# Patient Record
Sex: FEMALE | Race: WHITE | NOT HISPANIC OR LATINO | Employment: UNEMPLOYED | URBAN - METROPOLITAN AREA
[De-identification: names, ages, dates, MRNs, and addresses within clinical notes are randomized per-mention and may not be internally consistent; named-entity substitution may affect disease eponyms.]

---

## 2024-06-17 ENCOUNTER — HOSPITAL ENCOUNTER (INPATIENT)
Facility: HOSPITAL | Age: 84
LOS: 6 days | Discharge: HOME HEALTH | DRG: 872 | End: 2024-06-23
Attending: EMERGENCY MEDICINE | Admitting: INTERNAL MEDICINE
Payer: MEDICARE

## 2024-06-17 ENCOUNTER — APPOINTMENT (OUTPATIENT)
Dept: GENERAL RADIOLOGY | Facility: HOSPITAL | Age: 84
DRG: 872 | End: 2024-06-17
Payer: MEDICARE

## 2024-06-17 DIAGNOSIS — A41.9 SEPTIC SHOCK: Primary | ICD-10-CM

## 2024-06-17 DIAGNOSIS — R65.21 SEPTIC SHOCK: Primary | ICD-10-CM

## 2024-06-17 DIAGNOSIS — L03.116 CELLULITIS OF LEFT LOWER EXTREMITY: ICD-10-CM

## 2024-06-17 PROBLEM — R65.20 SEVERE SEPSIS: Status: ACTIVE | Noted: 2024-06-17

## 2024-06-17 LAB
ALBUMIN SERPL-MCNC: 3.7 G/DL (ref 3.5–5.2)
ALBUMIN/GLOB SERPL: 1.4 G/DL
ALP SERPL-CCNC: 46 U/L (ref 39–117)
ALT SERPL W P-5'-P-CCNC: 12 U/L (ref 1–33)
ANION GAP SERPL CALCULATED.3IONS-SCNC: 12.8 MMOL/L (ref 5–15)
AST SERPL-CCNC: 31 U/L (ref 1–32)
BASOPHILS # BLD AUTO: 0.02 10*3/MM3 (ref 0–0.2)
BASOPHILS NFR BLD AUTO: 0.1 % (ref 0–1.5)
BILIRUB SERPL-MCNC: 1.1 MG/DL (ref 0–1.2)
BUN SERPL-MCNC: 55 MG/DL (ref 8–23)
BUN/CREAT SERPL: 20.1 (ref 7–25)
CALCIUM SPEC-SCNC: 9.4 MG/DL (ref 8.6–10.5)
CHLORIDE SERPL-SCNC: 92 MMOL/L (ref 98–107)
CO2 SERPL-SCNC: 28.2 MMOL/L (ref 22–29)
CREAT SERPL-MCNC: 2.74 MG/DL (ref 0.57–1)
D-LACTATE SERPL-SCNC: 1.4 MMOL/L (ref 0.5–2)
D-LACTATE SERPL-SCNC: 2.1 MMOL/L (ref 0.5–2)
D-LACTATE SERPL-SCNC: 3.5 MMOL/L (ref 0.5–2)
DEPRECATED RDW RBC AUTO: 52.3 FL (ref 37–54)
EGFRCR SERPLBLD CKD-EPI 2021: 16.8 ML/MIN/1.73
EOSINOPHIL # BLD AUTO: 0.02 10*3/MM3 (ref 0–0.4)
EOSINOPHIL NFR BLD AUTO: 0.1 % (ref 0.3–6.2)
ERYTHROCYTE [DISTWIDTH] IN BLOOD BY AUTOMATED COUNT: 13.7 % (ref 12.3–15.4)
GLOBULIN UR ELPH-MCNC: 2.6 GM/DL
GLUCOSE BLDC GLUCOMTR-MCNC: 135 MG/DL (ref 70–130)
GLUCOSE SERPL-MCNC: 160 MG/DL (ref 65–99)
HCT VFR BLD AUTO: 41.5 % (ref 34–46.6)
HGB BLD-MCNC: 14.1 G/DL (ref 12–15.9)
IMM GRANULOCYTES # BLD AUTO: 0.07 10*3/MM3 (ref 0–0.05)
IMM GRANULOCYTES NFR BLD AUTO: 0.4 % (ref 0–0.5)
LYMPHOCYTES # BLD AUTO: 2.3 10*3/MM3 (ref 0.7–3.1)
LYMPHOCYTES NFR BLD AUTO: 12.9 % (ref 19.6–45.3)
MCH RBC QN AUTO: 35.1 PG (ref 26.6–33)
MCHC RBC AUTO-ENTMCNC: 34 G/DL (ref 31.5–35.7)
MCV RBC AUTO: 103.2 FL (ref 79–97)
MONOCYTES # BLD AUTO: 1.38 10*3/MM3 (ref 0.1–0.9)
MONOCYTES NFR BLD AUTO: 7.7 % (ref 5–12)
NEUTROPHILS NFR BLD AUTO: 14.03 10*3/MM3 (ref 1.7–7)
NEUTROPHILS NFR BLD AUTO: 78.8 % (ref 42.7–76)
PLATELET # BLD AUTO: 132 10*3/MM3 (ref 140–450)
PMV BLD AUTO: 10.4 FL (ref 6–12)
POTASSIUM SERPL-SCNC: 4.5 MMOL/L (ref 3.5–5.2)
PROT SERPL-MCNC: 6.3 G/DL (ref 6–8.5)
RBC # BLD AUTO: 4.02 10*6/MM3 (ref 3.77–5.28)
SODIUM SERPL-SCNC: 133 MMOL/L (ref 136–145)
WBC NRBC COR # BLD AUTO: 17.82 10*3/MM3 (ref 3.4–10.8)

## 2024-06-17 PROCEDURE — 82948 REAGENT STRIP/BLOOD GLUCOSE: CPT

## 2024-06-17 PROCEDURE — 25010000002 VANCOMYCIN HCL IN NACL 1.5-0.9 GM/500ML-% SOLUTION: Performed by: PHYSICIAN ASSISTANT

## 2024-06-17 PROCEDURE — 90715 TDAP VACCINE 7 YRS/> IM: CPT | Performed by: PHYSICIAN ASSISTANT

## 2024-06-17 PROCEDURE — 93005 ELECTROCARDIOGRAM TRACING: CPT | Performed by: PHYSICIAN ASSISTANT

## 2024-06-17 PROCEDURE — 99285 EMERGENCY DEPT VISIT HI MDM: CPT | Performed by: PHYSICIAN ASSISTANT

## 2024-06-17 PROCEDURE — 25010000002 TETANUS-DIPHTH-ACELL PERTUSSIS 5-2.5-18.5 LF-MCG/0.5 SUSPENSION PREFILLED SYRINGE: Performed by: PHYSICIAN ASSISTANT

## 2024-06-17 PROCEDURE — 80053 COMPREHEN METABOLIC PANEL: CPT | Performed by: PHYSICIAN ASSISTANT

## 2024-06-17 PROCEDURE — 25810000003 SODIUM CHLORIDE 0.9% - IBW FOR BMI > 30 0.9 % SOLUTION: Performed by: INTERNAL MEDICINE

## 2024-06-17 PROCEDURE — 93010 ELECTROCARDIOGRAM REPORT: CPT | Performed by: INTERNAL MEDICINE

## 2024-06-17 PROCEDURE — 83605 ASSAY OF LACTIC ACID: CPT | Performed by: PHYSICIAN ASSISTANT

## 2024-06-17 PROCEDURE — 71045 X-RAY EXAM CHEST 1 VIEW: CPT

## 2024-06-17 PROCEDURE — 99285 EMERGENCY DEPT VISIT HI MDM: CPT

## 2024-06-17 PROCEDURE — 90471 IMMUNIZATION ADMIN: CPT | Performed by: PHYSICIAN ASSISTANT

## 2024-06-17 PROCEDURE — 36415 COLL VENOUS BLD VENIPUNCTURE: CPT

## 2024-06-17 PROCEDURE — 25810000003 SODIUM CHLORIDE 0.9% - IBW FOR BMI > 30 0.9 % SOLUTION: Performed by: PHYSICIAN ASSISTANT

## 2024-06-17 PROCEDURE — 87040 BLOOD CULTURE FOR BACTERIA: CPT | Performed by: PHYSICIAN ASSISTANT

## 2024-06-17 PROCEDURE — 85025 COMPLETE CBC W/AUTO DIFF WBC: CPT | Performed by: PHYSICIAN ASSISTANT

## 2024-06-17 PROCEDURE — 73590 X-RAY EXAM OF LOWER LEG: CPT

## 2024-06-17 PROCEDURE — 25010000002 PIPERACILLIN SOD-TAZOBACTAM PER 1 G: Performed by: PHYSICIAN ASSISTANT

## 2024-06-17 RX ORDER — ACETAMINOPHEN 325 MG/1
650 TABLET ORAL EVERY 4 HOURS PRN
Status: DISCONTINUED | OUTPATIENT
Start: 2024-06-17 | End: 2024-06-23 | Stop reason: HOSPADM

## 2024-06-17 RX ORDER — SODIUM CHLORIDE 9 MG/ML
40 INJECTION, SOLUTION INTRAVENOUS AS NEEDED
Status: DISCONTINUED | OUTPATIENT
Start: 2024-06-17 | End: 2024-06-23 | Stop reason: HOSPADM

## 2024-06-17 RX ORDER — BISACODYL 5 MG/1
5 TABLET, DELAYED RELEASE ORAL DAILY PRN
Status: DISCONTINUED | OUTPATIENT
Start: 2024-06-17 | End: 2024-06-23 | Stop reason: HOSPADM

## 2024-06-17 RX ORDER — SODIUM CHLORIDE 0.9 % (FLUSH) 0.9 %
10 SYRINGE (ML) INJECTION AS NEEDED
Status: DISCONTINUED | OUTPATIENT
Start: 2024-06-17 | End: 2024-06-23 | Stop reason: HOSPADM

## 2024-06-17 RX ORDER — ONDANSETRON 4 MG/1
4 TABLET, ORALLY DISINTEGRATING ORAL EVERY 6 HOURS PRN
Status: DISCONTINUED | OUTPATIENT
Start: 2024-06-17 | End: 2024-06-23 | Stop reason: HOSPADM

## 2024-06-17 RX ORDER — SODIUM CHLORIDE 0.9 % (FLUSH) 0.9 %
10 SYRINGE (ML) INJECTION EVERY 12 HOURS SCHEDULED
Status: DISCONTINUED | OUTPATIENT
Start: 2024-06-18 | End: 2024-06-23 | Stop reason: HOSPADM

## 2024-06-17 RX ORDER — BISACODYL 10 MG
10 SUPPOSITORY, RECTAL RECTAL DAILY PRN
Status: DISCONTINUED | OUTPATIENT
Start: 2024-06-17 | End: 2024-06-23 | Stop reason: HOSPADM

## 2024-06-17 RX ORDER — VANCOMYCIN/0.9 % SOD CHLORIDE 1.5G/250ML
20 PLASTIC BAG, INJECTION (ML) INTRAVENOUS ONCE
Status: COMPLETED | OUTPATIENT
Start: 2024-06-17 | End: 2024-06-17

## 2024-06-17 RX ORDER — ONDANSETRON 2 MG/ML
4 INJECTION INTRAMUSCULAR; INTRAVENOUS EVERY 6 HOURS PRN
Status: DISCONTINUED | OUTPATIENT
Start: 2024-06-17 | End: 2024-06-23 | Stop reason: HOSPADM

## 2024-06-17 RX ORDER — CALCIUM CARBONATE 500 MG/1
2 TABLET, CHEWABLE ORAL 2 TIMES DAILY PRN
Status: DISCONTINUED | OUTPATIENT
Start: 2024-06-17 | End: 2024-06-23 | Stop reason: HOSPADM

## 2024-06-17 RX ORDER — SODIUM CHLORIDE 9 MG/ML
100 INJECTION, SOLUTION INTRAVENOUS CONTINUOUS
Status: DISCONTINUED | OUTPATIENT
Start: 2024-06-18 | End: 2024-06-21

## 2024-06-17 RX ORDER — ACETAMINOPHEN 650 MG/1
650 SUPPOSITORY RECTAL EVERY 4 HOURS PRN
Status: DISCONTINUED | OUTPATIENT
Start: 2024-06-17 | End: 2024-06-23 | Stop reason: HOSPADM

## 2024-06-17 RX ORDER — NITROGLYCERIN 0.4 MG/1
0.4 TABLET SUBLINGUAL
Status: DISCONTINUED | OUTPATIENT
Start: 2024-06-17 | End: 2024-06-23 | Stop reason: HOSPADM

## 2024-06-17 RX ORDER — POLYETHYLENE GLYCOL 3350 17 G/17G
17 POWDER, FOR SOLUTION ORAL DAILY PRN
Status: DISCONTINUED | OUTPATIENT
Start: 2024-06-17 | End: 2024-06-23 | Stop reason: HOSPADM

## 2024-06-17 RX ORDER — AMOXICILLIN 250 MG
2 CAPSULE ORAL 2 TIMES DAILY PRN
Status: DISCONTINUED | OUTPATIENT
Start: 2024-06-17 | End: 2024-06-23 | Stop reason: HOSPADM

## 2024-06-17 RX ORDER — ENOXAPARIN SODIUM 100 MG/ML
30 INJECTION SUBCUTANEOUS EVERY 24 HOURS
Status: DISCONTINUED | OUTPATIENT
Start: 2024-06-18 | End: 2024-06-18

## 2024-06-17 RX ORDER — ACETAMINOPHEN 160 MG/5ML
650 SOLUTION ORAL EVERY 4 HOURS PRN
Status: DISCONTINUED | OUTPATIENT
Start: 2024-06-17 | End: 2024-06-23 | Stop reason: HOSPADM

## 2024-06-17 RX ADMIN — SODIUM CHLORIDE 1000 ML: 9 INJECTION, SOLUTION INTRAVENOUS at 20:29

## 2024-06-17 RX ADMIN — PIPERACILLIN AND TAZOBACTAM 3.38 G: 3; .375 INJECTION, POWDER, FOR SOLUTION INTRAVENOUS at 15:50

## 2024-06-17 RX ADMIN — Medication 1500 MG: at 16:34

## 2024-06-17 RX ADMIN — TETANUS TOXOID, REDUCED DIPHTHERIA TOXOID AND ACELLULAR PERTUSSIS VACCINE, ADSORBED 0.5 ML: 5; 2.5; 8; 8; 2.5 SUSPENSION INTRAMUSCULAR at 15:48

## 2024-06-17 RX ADMIN — SODIUM CHLORIDE 1000 ML: 9 INJECTION, SOLUTION INTRAVENOUS at 15:46

## 2024-06-17 NOTE — FSED PROVIDER NOTE
Subjective   History of Present Illness  Patient is an 83-year-old female presents emergency room with head concern for infection of her left lower leg.  Her cat accidentally called her on Friday and the left shin.  She has been trying to keep it clean but the leg is getting red and swollen.  There is been slowly bleeding but is mostly contained.  In general she is not having any pain, and otherwise she has no complaints.  He is not running fevers.      Review of Systems   Constitutional: Negative.    HENT: Negative.     Eyes: Negative.    Respiratory: Negative.     Cardiovascular: Negative.    Gastrointestinal: Negative.    Genitourinary: Negative.    Musculoskeletal: Negative.    Skin:  Positive for color change and wound.   Neurological: Negative.    Psychiatric/Behavioral: Negative.     All other systems reviewed and are negative.      History reviewed. No pertinent past medical history.    No Known Allergies    History reviewed. No pertinent surgical history.    History reviewed. No pertinent family history.    Social History     Socioeconomic History    Marital status: Single           Objective   Physical Exam  Vitals reviewed.   Constitutional:       Appearance: Normal appearance. She is normal weight.   HENT:      Mouth/Throat:      Mouth: Mucous membranes are dry.   Eyes:      Conjunctiva/sclera: Conjunctivae normal.   Cardiovascular:      Rate and Rhythm: Normal rate.      Comments: Slightly irregular  Abdominal:      Tenderness: There is no right CVA tenderness or left CVA tenderness.   Skin:     General: Skin is warm and dry.      Comments: Healing wounds on anterior shin x 3, 1 has a slow oozing of blood, other to have soft bleeding, left lower leg up to the knee and down to ankle has redness and induration, more so anterior.  No pain with movement of knee or ankle.   Neurological:      Mental Status: She is alert.      Gait: Gait normal.   Psychiatric:         Mood and Affect: Mood normal.          Behavior: Behavior normal.         ECG 12 Lead      Date/Time: 6/17/2024 6:23 PM    Performed by: Germaine Chapman PA-C  Authorized by: Gino Munoz MD  Interpreted by ED physician  Comparison: not compared with previous ECG   Previous ECG: no previous ECG available  Rhythm: atrial fibrillation  Rate: normal  BPM: 86  Comments: A-fib without RVR.  QTc 476, low voltage, PVCs present,               ED Course                                           Medical Decision Making  Presentation, patient's primary concern was the cat scratch on the left shin and cellulitis from it.  I did x-ray to make sure there was no radio opaque foreign body none was seen.    Patient was found to be hypotensive in the ER with blood pressure around 75.  She did respond to fluids.  She is acute on chronic renal failure.  White count and lactic acid are elevated.  She was treated with Vanco and Zosyn as well as 30/cc normal saline for septic shock treatment.  After this fluid bolus, patient started on 125 an hour normal saline at maintenance.    Patient herself looks wonderful.  She has no complaints including no pain.  Urine is pending.  Chest x-ray shows mild cardiomegaly.  I spoken with Dr. Gino Munoz at [facility] and he accepts patient.  She is agreeable with admission.    Problems Addressed:  Cellulitis of left lower extremity: complicated acute illness or injury  Septic shock: complicated acute illness or injury    Amount and/or Complexity of Data Reviewed  Labs: ordered.  Radiology: ordered.  ECG/medicine tests: ordered.    Risk  Prescription drug management.  Decision regarding hospitalization.        Final diagnoses:   Septic shock   Cellulitis of left lower extremity       ED Disposition  ED Disposition       ED Disposition   Decision to Admit    Condition   --    Comment   Level of Care: Telemetry [5]   Diagnosis: Cellulitis of left leg [791577]   Admitting Physician: GINO MUNOZ [[id number]]   Attending Physician:  ANA LEWIS [[id number]]   Isolate for COVID?: No [0]   Certification: I Certify That Inpatient Hospital Services Are Medically Necessary For Greater Than 2 Midnights                 No follow-up provider specified.       Medication List      No changes were made to your prescriptions during this visit.

## 2024-06-18 PROBLEM — I10 HTN (HYPERTENSION): Status: ACTIVE | Noted: 2024-06-18

## 2024-06-18 PROBLEM — I48.19 ATRIAL FIBRILLATION, PERSISTENT: Status: ACTIVE | Noted: 2024-06-18

## 2024-06-18 PROBLEM — E78.5 HYPERLIPIDEMIA: Status: ACTIVE | Noted: 2024-06-18

## 2024-06-18 PROBLEM — E11.9 TYPE 2 DIABETES MELLITUS, WITHOUT LONG-TERM CURRENT USE OF INSULIN: Status: ACTIVE | Noted: 2024-06-18

## 2024-06-18 PROBLEM — Z86.73 HISTORY OF STROKE: Status: ACTIVE | Noted: 2024-06-18

## 2024-06-18 PROBLEM — I25.10 CAD (CORONARY ARTERY DISEASE): Status: ACTIVE | Noted: 2024-06-18

## 2024-06-18 LAB
ANION GAP SERPL CALCULATED.3IONS-SCNC: 10 MMOL/L (ref 5–15)
BUN SERPL-MCNC: 48 MG/DL (ref 8–23)
BUN/CREAT SERPL: 23 (ref 7–25)
CALCIUM SPEC-SCNC: 7.7 MG/DL (ref 8.6–10.5)
CHLORIDE SERPL-SCNC: 102 MMOL/L (ref 98–107)
CO2 SERPL-SCNC: 27 MMOL/L (ref 22–29)
CREAT SERPL-MCNC: 2.09 MG/DL (ref 0.57–1)
DEPRECATED RDW RBC AUTO: 51.3 FL (ref 37–54)
DIGOXIN SERPL-MCNC: <0.3 NG/ML (ref 0.6–1.2)
EGFRCR SERPLBLD CKD-EPI 2021: 23.3 ML/MIN/1.73
ERYTHROCYTE [DISTWIDTH] IN BLOOD BY AUTOMATED COUNT: 13.4 % (ref 12.3–15.4)
GLUCOSE SERPL-MCNC: 129 MG/DL (ref 65–99)
HCT VFR BLD AUTO: 35 % (ref 34–46.6)
HGB BLD-MCNC: 11.8 G/DL (ref 12–15.9)
MCH RBC QN AUTO: 35 PG (ref 26.6–33)
MCHC RBC AUTO-ENTMCNC: 33.7 G/DL (ref 31.5–35.7)
MCV RBC AUTO: 103.9 FL (ref 79–97)
PLATELET # BLD AUTO: 109 10*3/MM3 (ref 140–450)
PMV BLD AUTO: 11.3 FL (ref 6–12)
POTASSIUM SERPL-SCNC: 3.9 MMOL/L (ref 3.5–5.2)
QT INTERVAL: 398 MS
QTC INTERVAL: 476 MS
RBC # BLD AUTO: 3.37 10*6/MM3 (ref 3.77–5.28)
SODIUM SERPL-SCNC: 139 MMOL/L (ref 136–145)
WBC NRBC COR # BLD AUTO: 11.92 10*3/MM3 (ref 3.4–10.8)

## 2024-06-18 PROCEDURE — 85027 COMPLETE CBC AUTOMATED: CPT | Performed by: NURSE PRACTITIONER

## 2024-06-18 PROCEDURE — 25810000003 SODIUM CHLORIDE 0.9 % SOLUTION: Performed by: NURSE PRACTITIONER

## 2024-06-18 PROCEDURE — 80048 BASIC METABOLIC PNL TOTAL CA: CPT | Performed by: NURSE PRACTITIONER

## 2024-06-18 PROCEDURE — 80162 ASSAY OF DIGOXIN TOTAL: CPT | Performed by: INTERNAL MEDICINE

## 2024-06-18 PROCEDURE — 25010000002 PIPERACILLIN SOD-TAZOBACTAM PER 1 G: Performed by: INTERNAL MEDICINE

## 2024-06-18 RX ORDER — DIVALPROEX SODIUM 125 MG/1
125 TABLET, DELAYED RELEASE ORAL
COMMUNITY

## 2024-06-18 RX ORDER — ATORVASTATIN CALCIUM 40 MG/1
40 TABLET, FILM COATED ORAL DAILY
COMMUNITY

## 2024-06-18 RX ORDER — DILTIAZEM HYDROCHLORIDE 240 MG/1
240 CAPSULE, COATED, EXTENDED RELEASE ORAL 2 TIMES DAILY
COMMUNITY

## 2024-06-18 RX ORDER — MELATONIN
2000 DAILY
COMMUNITY

## 2024-06-18 RX ORDER — MULTIVIT WITH MINERALS/LUTEIN
500 TABLET ORAL DAILY
COMMUNITY

## 2024-06-18 RX ORDER — DIVALPROEX SODIUM 125 MG/1
125 TABLET, DELAYED RELEASE ORAL 3 TIMES DAILY
Status: DISCONTINUED | OUTPATIENT
Start: 2024-06-18 | End: 2024-06-18

## 2024-06-18 RX ORDER — SERTRALINE HYDROCHLORIDE 25 MG/1
25 TABLET, FILM COATED ORAL DAILY
Status: DISCONTINUED | OUTPATIENT
Start: 2024-06-18 | End: 2024-06-23 | Stop reason: HOSPADM

## 2024-06-18 RX ORDER — METOPROLOL TARTRATE 50 MG/1
50 TABLET, FILM COATED ORAL 2 TIMES DAILY
Status: ON HOLD | COMMUNITY
End: 2024-06-18

## 2024-06-18 RX ORDER — METOPROLOL SUCCINATE 50 MG/1
50 TABLET, EXTENDED RELEASE ORAL
Status: DISCONTINUED | OUTPATIENT
Start: 2024-06-19 | End: 2024-06-21

## 2024-06-18 RX ORDER — TORSEMIDE 20 MG/1
20 TABLET ORAL DAILY
COMMUNITY

## 2024-06-18 RX ORDER — POTASSIUM CHLORIDE 1500 MG/1
20 TABLET, FILM COATED, EXTENDED RELEASE ORAL DAILY
COMMUNITY

## 2024-06-18 RX ORDER — HYDROXYZINE HYDROCHLORIDE 25 MG/1
25 TABLET, FILM COATED ORAL 3 TIMES DAILY PRN
COMMUNITY

## 2024-06-18 RX ORDER — ASPIRIN 81 MG/1
81 TABLET ORAL DAILY
Status: DISCONTINUED | OUTPATIENT
Start: 2024-06-18 | End: 2024-06-23 | Stop reason: HOSPADM

## 2024-06-18 RX ORDER — SERTRALINE HYDROCHLORIDE 25 MG/1
25 TABLET, FILM COATED ORAL DAILY
COMMUNITY

## 2024-06-18 RX ORDER — DOXYCYCLINE 100 MG/1
100 CAPSULE ORAL EVERY 12 HOURS SCHEDULED
Status: DISCONTINUED | OUTPATIENT
Start: 2024-06-18 | End: 2024-06-23 | Stop reason: HOSPADM

## 2024-06-18 RX ORDER — ASPIRIN 81 MG/1
81 TABLET ORAL DAILY
COMMUNITY

## 2024-06-18 RX ORDER — HYDROXYZINE HYDROCHLORIDE 25 MG/1
25 TABLET, FILM COATED ORAL 3 TIMES DAILY PRN
Status: DISCONTINUED | OUTPATIENT
Start: 2024-06-18 | End: 2024-06-23 | Stop reason: HOSPADM

## 2024-06-18 RX ORDER — GABAPENTIN 100 MG/1
100 CAPSULE ORAL 3 TIMES DAILY
COMMUNITY

## 2024-06-18 RX ORDER — DIVALPROEX SODIUM 125 MG/1
125 TABLET, DELAYED RELEASE ORAL 2 TIMES DAILY
Status: DISCONTINUED | OUTPATIENT
Start: 2024-06-18 | End: 2024-06-23 | Stop reason: HOSPADM

## 2024-06-18 RX ORDER — METOPROLOL SUCCINATE 50 MG/1
50 TABLET, EXTENDED RELEASE ORAL DAILY
COMMUNITY
End: 2024-06-23 | Stop reason: HOSPADM

## 2024-06-18 RX ORDER — METOPROLOL TARTRATE 50 MG/1
50 TABLET, FILM COATED ORAL 2 TIMES DAILY
Status: DISCONTINUED | OUTPATIENT
Start: 2024-06-18 | End: 2024-06-18

## 2024-06-18 RX ORDER — DIGOXIN 125 MCG
62.5 TABLET ORAL
Status: ON HOLD | COMMUNITY
End: 2024-06-18

## 2024-06-18 RX ORDER — HYDROCODONE BITARTRATE AND ACETAMINOPHEN 5; 325 MG/1; MG/1
1 TABLET ORAL EVERY 6 HOURS PRN
Status: DISCONTINUED | OUTPATIENT
Start: 2024-06-18 | End: 2024-06-23 | Stop reason: HOSPADM

## 2024-06-18 RX ORDER — ATORVASTATIN CALCIUM 20 MG/1
40 TABLET, FILM COATED ORAL DAILY
Status: DISCONTINUED | OUTPATIENT
Start: 2024-06-18 | End: 2024-06-23 | Stop reason: HOSPADM

## 2024-06-18 RX ORDER — DILTIAZEM HYDROCHLORIDE 240 MG/1
240 CAPSULE, COATED, EXTENDED RELEASE ORAL DAILY
Status: DISCONTINUED | OUTPATIENT
Start: 2024-06-18 | End: 2024-06-20

## 2024-06-18 RX ORDER — GABAPENTIN 100 MG/1
100 CAPSULE ORAL 3 TIMES DAILY
Status: DISCONTINUED | OUTPATIENT
Start: 2024-06-18 | End: 2024-06-23 | Stop reason: HOSPADM

## 2024-06-18 RX ORDER — LISINOPRIL 5 MG/1
5 TABLET ORAL DAILY
COMMUNITY

## 2024-06-18 RX ADMIN — Medication 10 ML: at 01:11

## 2024-06-18 RX ADMIN — PIPERACILLIN AND TAZOBACTAM 3.38 G: 3; .375 INJECTION, POWDER, FOR SOLUTION INTRAVENOUS at 20:26

## 2024-06-18 RX ADMIN — ATORVASTATIN CALCIUM 40 MG: 20 TABLET, FILM COATED ORAL at 10:00

## 2024-06-18 RX ADMIN — SODIUM CHLORIDE 100 ML/HR: 9 INJECTION, SOLUTION INTRAVENOUS at 18:14

## 2024-06-18 RX ADMIN — DIVALPROEX SODIUM 125 MG: 125 TABLET, DELAYED RELEASE ORAL at 10:00

## 2024-06-18 RX ADMIN — PIPERACILLIN AND TAZOBACTAM 3.38 G: 3; .375 INJECTION, POWDER, FOR SOLUTION INTRAVENOUS at 13:53

## 2024-06-18 RX ADMIN — GABAPENTIN 100 MG: 100 CAPSULE ORAL at 20:25

## 2024-06-18 RX ADMIN — DILTIAZEM HYDROCHLORIDE 240 MG: 240 CAPSULE, COATED, EXTENDED RELEASE ORAL at 10:00

## 2024-06-18 RX ADMIN — DIVALPROEX SODIUM 125 MG: 125 TABLET, DELAYED RELEASE ORAL at 20:26

## 2024-06-18 RX ADMIN — GABAPENTIN 100 MG: 100 CAPSULE ORAL at 16:48

## 2024-06-18 RX ADMIN — APIXABAN 2.5 MG: 2.5 TABLET, FILM COATED ORAL at 20:25

## 2024-06-18 RX ADMIN — DOXYCYCLINE 100 MG: 100 INJECTION, POWDER, LYOPHILIZED, FOR SOLUTION INTRAVENOUS at 04:55

## 2024-06-18 RX ADMIN — SERTRALINE 25 MG: 25 TABLET, FILM COATED ORAL at 10:00

## 2024-06-18 RX ADMIN — METOPROLOL TARTRATE 50 MG: 50 TABLET, FILM COATED ORAL at 10:00

## 2024-06-18 RX ADMIN — ASPIRIN 81 MG: 81 TABLET, COATED ORAL at 10:00

## 2024-06-18 RX ADMIN — Medication 10 ML: at 10:00

## 2024-06-18 RX ADMIN — DOXYCYCLINE 100 MG: 100 CAPSULE ORAL at 16:48

## 2024-06-18 RX ADMIN — PIPERACILLIN AND TAZOBACTAM 3.38 G: 3; .375 INJECTION, POWDER, FOR SOLUTION INTRAVENOUS at 06:11

## 2024-06-18 RX ADMIN — GABAPENTIN 100 MG: 100 CAPSULE ORAL at 10:00

## 2024-06-18 RX ADMIN — APIXABAN 2.5 MG: 2.5 TABLET, FILM COATED ORAL at 10:00

## 2024-06-18 RX ADMIN — SODIUM CHLORIDE 100 ML/HR: 9 INJECTION, SOLUTION INTRAVENOUS at 01:11

## 2024-06-18 NOTE — PROGRESS NOTES
Clinical Pharmacy Services: Medication History    Madelene Naegele is a 83 y.o. female presenting to [facility] for Cellulitis of left leg [L03.116]  Cellulitis of left lower extremity [L03.116]  Septic shock [A41.9, R65.21]    She  has a past medical history of CAD (coronary artery disease), History of stroke, Hyperlipidemia, Hypertension, Persistent atrial fibrillation, and Type 2 diabetes mellitus.    Allergies as of 06/17/2024    (No Known Allergies)       Medication information was obtained from:   Pharmacy and Phone Number:     Prior to Admission Medications       Prescriptions Last Dose Informant Patient Reported? Taking?    apixaban (ELIQUIS) 5 MG tablet tablet 6/16/2024  Yes Yes    Take 1 tablet by mouth 2 (Two) Times a Day.    aspirin 81 MG EC tablet 6/17/2024  Yes Yes    Take 1 tablet by mouth Daily.    atorvastatin (LIPITOR) 40 MG tablet 6/17/2024  Yes Yes    Take 1 tablet by mouth Daily.    Cholecalciferol 25 MCG (1000 UT) tablet 6/17/2024  Yes Yes    Take 2 tablets by mouth Daily.    dilTIAZem CD (CARDIZEM CD) 240 MG 24 hr capsule 6/16/2024  Yes Yes    Take 1 capsule by mouth 2 (Two) Times a Day.    divalproex (DEPAKOTE) 125 MG DR tablet 6/16/2024  Yes Yes    Take 1 tablet by mouth.    fluorometholone (EFLONE) 0.1 % ophthalmic suspension 6/17/2024  Yes Yes    Administer 1 drop to both eyes 4 (Four) Times a Day. PRN    gabapentin (NEURONTIN) 100 MG capsule 6/16/2024  Yes Yes    Take 1 capsule by mouth 3 (Three) Times a Day.    hydrOXYzine (ATARAX) 25 MG tablet 6/17/2024  Yes Yes    Take 1 tablet by mouth 3 (Three) Times a Day As Needed for Itching.    lisinopril (PRINIVIL,ZESTRIL) 5 MG tablet 6/17/2024  Yes Yes    Take 1 tablet by mouth Daily.    metFORMIN (GLUCOPHAGE) 500 MG tablet   Yes Yes    Take 1 tablet by mouth 2 (Two) Times a Day With Meals.    metoprolol succinate XL (TOPROL-XL) 50 MG 24 hr tablet   Yes Yes    Take 1 tablet by mouth Daily.    potassium chloride (K-DUR,KLOR-CON)  20 MEQ tablet controlled-release ER tablet 6/17/2024  Yes Yes    Take 1 tablet by mouth Daily.    sertraline (ZOLOFT) 25 MG tablet 6/17/2024  Yes Yes    Take 1 tablet by mouth Daily.    torsemide (DEMADEX) 20 MG tablet 6/17/2024  Yes Yes    Take 1 tablet by mouth Daily.    vitamin C (ASCORBIC ACID) 250 MG tablet 6/17/2024  Yes Yes    Take 2 tablets by mouth Daily.              Medication notes:     This medication list is complete to the best of my knowledge as of 6/18/2024    Please call if questions.    Yamilex Boyce, PharmD, BCPS  6/18/2024 10:42 EDT

## 2024-06-18 NOTE — NURSING NOTE
06/18/24 0951   Wound 06/18/24 0112 Left lower leg   Placement Date/Time: 06/18/24 0112   Side: Left  Orientation: lower  Location: leg   Base purple  (4 scratches/puncture wounds to LLE, 1 puncture wound continues to ooze bloody draiange, the others are without draiange.)   Periwound ecchymotic;dry;moist;swelling   Periwound Temperature warm   Periwound Skin Turgor soft   Drainage Characteristics/Odor sanguineous;bleeding controlled  (surgicel and pressure dressing applied to control bleeding at the puncture site.)   Drainage Amount moderate   Care, Wound cleansed with;antimicrobial agent applied   Dressing Care dressing changed  (Surgicel applied to pucture site to control bleeding, covered the entire area with xeroform guaze, then ABD pad. LLE wraped with Kerlix adn ACE wrap x2 toes to knee.)   Periwound Care dry periwound area maintained     CWON note: pt seen for evaluation of LLE cellulitis with multiple cat scratches/ puncture sites noted. Only one puncture site continues to ooze sanguinous drainage, surgicel applied to site. Dressing applied as stated above, this can be changed every other day and prn for soiling/ bloody drainage. Wound care orders placed and discussed with RNRadha. Will plan to follow up later this week to reassess the wound..

## 2024-06-18 NOTE — H&P
Patient Name:  Madelene Naegele  YOB: 1940  MRN:  [id number]  Admit Date:  6/17/2024  Patient Care Team:  Provider, No Known as PCP - General      Subjective   History Present Illness     Chief Complaint   Patient presents with    Wound Check       Ms. Naegele is a 83 y.o. non-smoker with a history of persistent atrial fibrillation, hypertension, coronary artery disease, stroke, hyperlipidemia, and type 2 diabetes mellitus that presents to [facility] complaining of a wound to her left leg. She reports she was scratched by her cat 2 days ago and the wound has worsened. She reports redness around the wound and swelling in the leg. She denies leg pain, fever, and chills. She initially presented to the [facility] where lab work revealed creatinine 2.74, BUN 55, WBC 17.82, platelets 132, lactate 3.5 and repeat 2.1. An  x-ray showed soft tissue swelling without evidence of radiopaque foreign bodies. She was found to be hypotensive and received a sepsis fluid bolus. She also received Zosyn and Vancomycin and was transferred for further evaluation.        History of Present Illness  Review of Systems   Constitutional:  Negative for chills and fever.   HENT:  Negative for congestion and sore throat.    Eyes:  Negative for photophobia and visual disturbance.   Respiratory:  Negative for cough, shortness of breath and wheezing.    Cardiovascular:  Positive for leg swelling. Negative for chest pain and palpitations.   Gastrointestinal:  Negative for abdominal pain, nausea and vomiting.   Musculoskeletal:  Negative for arthralgias and myalgias.   Skin:  Positive for color change and wound.   Neurological:  Negative for dizziness, weakness, light-headedness, numbness and headaches.        Personal History     Past Medical History:   Diagnosis Date    CAD (coronary artery disease)     History of stroke     Hyperlipidemia     Hypertension     Persistent atrial fibrillation     Type 2 diabetes  mellitus      History reviewed. No pertinent surgical history.  History reviewed. No pertinent family history.     Medications Prior to Admission   Medication Sig Dispense Refill Last Dose    apixaban (ELIQUIS) 5 MG tablet tablet Take 1 tablet by mouth 2 (Two) Times a Day.   6/16/2024    aspirin 81 MG EC tablet Take 1 tablet by mouth Daily.   6/17/2024    atorvastatin (LIPITOR) 40 MG tablet Take 1 tablet by mouth Daily.   6/17/2024    Cholecalciferol 25 MCG (1000 UT) tablet Take 2 tablets by mouth Daily.   6/17/2024    dilTIAZem CD (CARDIZEM CD) 240 MG 24 hr capsule Take 1 capsule by mouth Daily.   6/16/2024    divalproex (DEPAKOTE) 125 MG DR tablet Take 1 tablet by mouth 3 (Three) Times a Day.   6/16/2024    fluorometholone (EFLONE) 0.1 % ophthalmic suspension Administer 1 drop to both eyes 4 (Four) Times a Day. PRN   6/17/2024    gabapentin (NEURONTIN) 100 MG capsule Take 1 capsule by mouth 3 (Three) Times a Day.   6/16/2024    hydrOXYzine (ATARAX) 25 MG tablet Take 1 tablet by mouth 3 (Three) Times a Day As Needed for Itching.   6/17/2024    lisinopril (PRINIVIL,ZESTRIL) 5 MG tablet Take 1 tablet by mouth Daily.   6/17/2024    metoprolol tartrate (LOPRESSOR) 50 MG tablet Take 1 tablet by mouth 2 (Two) Times a Day.   6/17/2024    potassium chloride (K-DUR,KLOR-CON) 20 MEQ tablet controlled-release ER tablet Take 1 tablet by mouth Daily.   6/17/2024    sertraline (ZOLOFT) 25 MG tablet Take 1 tablet by mouth Daily.   6/17/2024    torsemide (DEMADEX) 20 MG tablet Take 1 tablet by mouth Daily.   6/17/2024    vitamin C (ASCORBIC ACID) 250 MG tablet Take 2 tablets by mouth Daily.   6/17/2024     Allergies:  No Known Allergies    Objective    Objective     Vital Signs  Temp:  [97.7 °F (36.5 °C)-98.4 °F (36.9 °C)] 97.7 °F (36.5 °C)  Heart Rate:  [] 106  Resp:  [16-20] 18  BP: ()/(41-77) 101/51  SpO2:  [96 %-100 %] 98 %  on   ;   Device (Oxygen Therapy): room air  Body mass index is 26.92 kg/m².    Physical  Exam  Vitals and nursing note reviewed.   Constitutional:       Appearance: Normal appearance.   HENT:      Head: Normocephalic and atraumatic.      Nose: Nose normal.      Mouth/Throat:      Mouth: Mucous membranes are moist.      Pharynx: Oropharynx is clear.   Eyes:      Extraocular Movements: Extraocular movements intact.      Conjunctiva/sclera: Conjunctivae normal.   Cardiovascular:      Rate and Rhythm: Normal rate and regular rhythm.      Pulses: Normal pulses.      Heart sounds: Normal heart sounds.   Pulmonary:      Effort: Pulmonary effort is normal.      Breath sounds: Normal breath sounds.   Abdominal:      General: Bowel sounds are normal.      Palpations: Abdomen is soft.   Musculoskeletal:         General: Normal range of motion.      Cervical back: Normal range of motion and neck supple.      Right lower leg: Edema (1+ edema) present.      Left lower leg: Edema (1-2+ edema) present.   Skin:     General: Skin is warm and dry.      Comments: Three areas of broken skin to left anterior shin with surrounding erythema and warmth   Neurological:      General: No focal deficit present.      Mental Status: She is alert and oriented to person, place, and time.   Psychiatric:         Mood and Affect: Mood normal.         Behavior: Behavior normal.         Results Review:  I reviewed the patient's new clinical results.  I reviewed the patient's new imaging results and agree with the interpretation.  I reviewed the patient's other test results and agree with the interpretation  I personally viewed and interpreted the patient's EKG/Telemetry data  Discussed with ED provider.    Lab Results (last 24 hours)       Procedure Component Value Units Date/Time    CBC & Differential [[id number]]  (Abnormal) Collected: 06/17/24 1518    Specimen: Blood Updated: 06/17/24 1523    Narrative:      The following orders were created for panel order CBC & Differential.  Procedure                               Abnormality          Status                     ---------                               -----------         ------                     CBC Auto Differential[[id number]]        Abnormal            Final result                 Please view results for these tests on the individual orders.    Comprehensive Metabolic Panel [[id number]]  (Abnormal) Collected: 06/17/24 1518    Specimen: Blood Updated: 06/17/24 1548     Glucose 160 mg/dL      BUN 55 mg/dL      Creatinine 2.74 mg/dL      Sodium 133 mmol/L      Potassium 4.5 mmol/L      Chloride 92 mmol/L      CO2 28.2 mmol/L      Calcium 9.4 mg/dL      Total Protein 6.3 g/dL      Albumin 3.7 g/dL      ALT (SGPT) 12 U/L      AST (SGOT) 31 U/L      Alkaline Phosphatase 46 U/L      Total Bilirubin 1.1 mg/dL      Globulin 2.6 gm/dL      A/G Ratio 1.4 g/dL      BUN/Creatinine Ratio 20.1     Anion Gap 12.8 mmol/L      eGFR 16.8 mL/min/1.73     Narrative:      GFR Normal >60  Chronic Kidney Disease <60  Kidney Failure <15    The GFR formula is only valid for adults with stable renal function between ages 18 and 70.    Lactic Acid, Plasma [[id number]]  (Abnormal) Collected: 06/17/24 1518    Specimen: Blood Updated: 06/17/24 1543     Lactate 3.5 mmol/L     Blood Culture - Blood, Arm, Right [[id number]] Collected: 06/17/24 1518    Specimen: Blood from Arm, Right Updated: 06/17/24 1522    Blood Culture - Blood, Arm, Left [[id number]] Collected: 06/17/24 1518    Specimen: Blood from Arm, Left Updated: 06/17/24 1520    CBC Auto Differential [[id number]]  (Abnormal) Collected: 06/17/24 1518    Specimen: Blood Updated: 06/17/24 1523     WBC 17.82 10*3/mm3      RBC 4.02 10*6/mm3      Hemoglobin 14.1 g/dL      Hematocrit 41.5 %      .2 fL      MCH 35.1 pg      MCHC 34.0 g/dL      RDW 13.7 %      RDW-SD 52.3 fl      MPV 10.4 fL      Platelets 132 10*3/mm3      Neutrophil % 78.8 %      Lymphocyte % 12.9 %      Monocyte % 7.7 %      Eosinophil % 0.1 %      Basophil % 0.1 %      Immature Grans % 0.4 %       Neutrophils, Absolute 14.03 10*3/mm3      Lymphocytes, Absolute 2.30 10*3/mm3      Monocytes, Absolute 1.38 10*3/mm3      Eosinophils, Absolute 0.02 10*3/mm3      Basophils, Absolute 0.02 10*3/mm3      Immature Grans, Absolute 0.07 10*3/mm3     STAT Lactic Acid, Reflex [[id number]]  (Abnormal) Collected: 06/17/24 1905    Specimen: Blood from Arm, Right Updated: 06/17/24 1928     Lactate 2.1 mmol/L     POC Glucose Once [[id number]]  (Abnormal) Collected: 06/17/24 2228    Specimen: Blood Updated: 06/17/24 2230     Glucose 135 mg/dL     STAT Lactic Acid, Reflex [[id number]]  (Normal) Collected: 06/17/24 2304    Specimen: Blood Updated: 06/17/24 2328     Lactate 1.4 mmol/L     Basic Metabolic Panel [[id number]]  (Abnormal) Collected: 06/18/24 0355    Specimen: Blood Updated: 06/18/24 0441     Glucose 129 mg/dL      BUN 48 mg/dL      Creatinine 2.09 mg/dL      Sodium 139 mmol/L      Potassium 3.9 mmol/L      Chloride 102 mmol/L      CO2 27.0 mmol/L      Calcium 7.7 mg/dL      BUN/Creatinine Ratio 23.0     Anion Gap 10.0 mmol/L      eGFR 23.3 mL/min/1.73     Narrative:      GFR Normal >60  Chronic Kidney Disease <60  Kidney Failure <15    The GFR formula is only valid for adults with stable renal function between ages 18 and 70.    CBC (No Diff) [[id number]]  (Abnormal) Collected: 06/18/24 0355    Specimen: Blood Updated: 06/18/24 0442     WBC 11.92 10*3/mm3      RBC 3.37 10*6/mm3      Hemoglobin 11.8 g/dL      Hematocrit 35.0 %      .9 fL      MCH 35.0 pg      MCHC 33.7 g/dL      RDW 13.4 %      RDW-SD 51.3 fl      MPV 11.3 fL      Platelets 109 10*3/mm3             Imaging Results (Last 24 Hours)       Procedure Component Value Units Date/Time    XR Tibia Fibula 2 View Left [[id number]] Collected: 06/17/24 1612     Updated: 06/17/24 1616    Narrative:      XR TIBIA FIBULA 2 VW LEFT-     HISTORY: Female who is 83 years-old, sepsis, scratch     TECHNIQUE: Frontal view of the chest, frontal and lateral views of the  left  lower leg     COMPARISON: None available     FINDINGS:  Chest x-ray: The heart is enlarged. Pulmonary vasculature is  unremarkable. No focal pulmonary consolidation, pleural effusion, or  pneumothorax. No acute osseous process.     Left lower leg: Soft tissue swelling at the left lower leg may be  evidence of inflammation, infection, injury, venous insufficiency. No  radiopaque foreign bodies are noted. No acute fracture, erosion, or  dislocation is identified. Degenerative spurring is seen at the knee. At  least moderate calcaneal spurring is apparent, but only partly included.     Follow-up/further evaluation can be obtained as indications persist.       Impression:      As described.     This report was finalized on 6/17/2024 4:12 PM by Dr. Elliot Wilkinson M.D on Workstation: [employer]       XR Chest 1 View [[id number]] Collected: 06/17/24 1556     Updated: 06/17/24 1601    Narrative:      XR CHEST 1 VW-     HISTORY: Female who is 83 years-old, sepsis, scratch     TECHNIQUE: Frontal view of the chest, frontal and lateral views of the  left lower leg     COMPARISON: None available     FINDINGS:     Chest x-ray: The heart is enlarged. Pulmonary vasculature is  unremarkable. No focal pulmonary consolidation, pleural effusion, or  pneumothorax. No acute osseous process.     Left lower leg: Soft tissue swelling at the left lower leg may be  evidence of inflammation, infection, injury, venous insufficiency. No  radiopaque foreign bodies are noted. No acute fracture, erosion, or  dislocation is identified. Degenerative spurring is seen at the knee. At  least moderate calcaneal spurring is apparent, but only partly included.     Follow-up/further evaluation can be obtained as indications persist.       Impression:      As described.     This report was finalized on 6/17/2024 3:58 PM by Dr. Elliot Wilkinson M.D on Workstation: [employer]                   ECG 12 Lead   ED Interpretation   Germaine Chapman PA-C     6/17/2024   6:43 PM   ECG 12 Lead         Date/Time: 6/17/2024 6:23 PM      Performed by: Germaine Chapman PA-C   Authorized by: Gino Munoz MD  Interpreted by ED physician   Comparison: not compared with previous ECG    Previous ECG: no previous ECG available   Rhythm: atrial fibrillation   Rate: normal   BPM: 86   Comments: A-fib without RVR.  QTc 476, low voltage, PVCs present,      ECG 12 Lead Other; sepsis   Preliminary Result   HEART RATE= 86  bpm   RR Interval= 698  ms   MA Interval=   ms   P Horizontal Axis=   deg   P Front Axis=   deg   QRSD Interval= 94  ms   QT Interval= 398  ms   QTcB= 476  ms   QRS Axis= 51  deg   T Wave Axis= 149  deg   - ABNORMAL ECG -   Atrial fibrillation   Paired ventricular premature complexes   Borderline low voltage, extremity leads   Abnrm T, consider ischemia, anterolateral lds   Electronically Signed By:    Date and Time of Study: 2024-06-17 15:24:11      Telemetry Scan   Final Result      Telemetry Scan   Final Result           Assessment/Plan     Active Hospital Problems    Diagnosis  POA    **Cellulitis of left leg [L03.116]  Yes    HTN (hypertension) [I10]  Unknown    Atrial fibrillation, persistent [I48.19]  Unknown    Hyperlipidemia [E78.5]  Unknown    History of stroke [Z86.73]  Not Applicable    CAD (coronary artery disease) [I25.10]  Unknown    Type 2 diabetes mellitus, without long-term current use of insulin [E11.9]  Unknown    Severe sepsis [A41.9, R65.20]  Unknown       Severe sepsis secondary to cellulitis of left leg  -She meets sepsis criteria with hypotension, leukocytosis, and lactic acidosis  -FOUZIA. Her baseline creatinine appears to be around 1  -Lactic acidosis has resolved. 3.5 > 2.1 > 1.4  -Her pressures have stabilized with fluid resuscitation. Continue NS @ 100 ml/hr  -She received Zosyn and Vancomycin at the outside facility. Will continue Zosyn and add Doxycycline  -Repeat labs in AM  -WOCN consult    Hypertension  Persistent Atrial Fibrillation  -She has  been hypotensive secondary to sepsis, heart rate is controlled  -Hold lisinopril and torsemide   -Continue diltiazem and metoprolol, will add parameters  -Monitor blood pressures closely    Hyperlipidemia  History of Stroke  Coronary Artery Disease  -Continue aspirin and statin    Type 2 Diabetes Mellitus  -Hold oral diabetic medications   -Initiate correctional factor insulin  -Monitor blood sugars ACHS  -Hgb A1C was 7.8 on 5/3/24    Thrombocytopenia  -Secondary to Eliquis?  -Repeat labs in AM    -I discussed the patients findings and my recommendations with patient.    VTE Prophylaxis - SCDs.  Code Status - Full code.       YOVANY Thomason  [facility]  06/18/24  [id number]

## 2024-06-18 NOTE — CASE MANAGEMENT/SOCIAL WORK
Discharge Planning Assessment  [facility]     Patient Name: Madelene Naegele  MRN: [id number]  Today's Date: 6/18/2024    Admit Date: 6/17/2024    Plan: Home, caregiver to transport.   Discharge Needs Assessment       Row Name 06/18/24 1403       Living Environment    People in Home alone    Current Living Arrangements home    Primary Care Provided by homecare agency    Family Caregiver if Needed other relative(s)    Quality of Family Relationships helpful;involved;supportive    Able to Return to Prior Arrangements yes       Transition Planning    Patient/Family Anticipates Transition to home with help/services;home    Transportation Anticipated family or friend will provide       Discharge Needs Assessment    Readmission Within the Last 30 Days no previous admission in last 30 days    Equipment Currently Used at Home walker, rolling;rollator    Concerns to be Addressed discharge planning    Equipment Needed After Discharge none                   Discharge Plan       Row Name 06/18/24 1404       Plan    Plan Home, caregiver to transport.    Patient/Family in Agreement with Plan yes    Plan Comments CCP met with pt, Jovita, pts healthcare surrogate and Jackelyn pts caregiver. Introduced self and role of CCP. Pt gave CCP permission to speak in front of Ran. Pt lives alone in a single-story condo. Pt does not drive, has assistance with ADL's and has a rollator and walker at home. Jovita, pts HCS, states she manages pts medications with a pill pack and pt has caregivers 5 days a week from 9-4:30 and pt is alone on the weekends. Jovita does check on pt every other weekend. Pt's updated living will and HCS paperwork uploaded into Epic. Pt is enrolled in meds to beds and denies trouble affording medications. Jovita denies HH history but pt has been to [facility] and [facility]. DC plan is for pt to return home, Jovita will arrange one of the caregivers to transport pt home. Alex CONWAY/CCP                   Continued Care and Services - Admitted Since 6/17/2024    No active coordination exists for this encounter.       Expected Discharge Date and Time       Expected Discharge Date Expected Discharge Time    Jun 20, 2024            Demographic Summary    No documentation.                  Functional Status       Row Name 06/18/24 1402       Functional Status    Usual Activity Tolerance moderate    Current Activity Tolerance moderate       Assessment of Health Literacy    How often do you have someone help you read hospital materials? Often    How often do you have problems learning about your medical condition because of difficulty understanding written information? Often    How often do you have a problem understanding what is told to you about your medical condition? Often    How confident are you filling out medical forms by yourself? A little bit    Health Literacy Fair       Functional Status, IADL    Medications assistive person    Meal Preparation assistive person    Housekeeping assistive person    Laundry assistive person    Shopping assistive person                               Flores Schuster RN

## 2024-06-18 NOTE — PLAN OF CARE
Goal Outcome Evaluation:  Plan of Care Reviewed With: patient           Outcome Evaluation: New admit from [facility]. Dx of cellulitis in LLE. Pt is AO4, Af0b on the monitor. Pt would on LLE is dressed and CDI. Moderate amt of oozing from the site. Abx started this shift; zosyn currently running. Plan of care continues    Problem: Adult Inpatient Plan of Care  Goal: Plan of Care Review  Outcome: Ongoing, Progressing  Flowsheets (Taken 6/18/2024 0625)  Plan of Care Reviewed With: patient  Outcome Evaluation:   New admit from [facility]. Dx of cellulitis in LLE. Pt is AO4, Af0b on the monitor. Pt would on LLE is dressed and CDI. Moderate amt of oozing from the site. Abx started this shift   zosyn currently running. Plan of care continues  Goal: Patient-Specific Goal (Individualized)  Outcome: Ongoing, Progressing  Goal: Absence of Hospital-Acquired Illness or Injury  Outcome: Ongoing, Progressing  Intervention: Identify and Manage Fall Risk  Recent Flowsheet Documentation  Taken 6/18/2024 0623 by Gabby Gilbert, RN  Safety Promotion/Fall Prevention:   assistive device/personal items within reach   activity supervised   clutter free environment maintained   fall prevention program maintained   lighting adjusted   nonskid shoes/slippers when out of bed   room organization consistent   safety round/check completed  Taken 6/18/2024 0412 by Gabby Gilbert, RN  Safety Promotion/Fall Prevention:   assistive device/personal items within reach   activity supervised   clutter free environment maintained   fall prevention program maintained   lighting adjusted   nonskid shoes/slippers when out of bed   room organization consistent   safety round/check completed  Taken 6/18/2024 0255 by Gabby Gilbert, RN  Safety Promotion/Fall Prevention:   assistive device/personal items within reach   activity supervised   clutter free environment maintained   fall prevention program maintained   lighting adjusted   nonskid  shoes/slippers when out of bed   room organization consistent   safety round/check completed  Taken 6/18/2024 0038 by Gabby Gilbert RN  Safety Promotion/Fall Prevention:   assistive device/personal items within reach   activity supervised   clutter free environment maintained   fall prevention program maintained   lighting adjusted   nonskid shoes/slippers when out of bed   room organization consistent   safety round/check completed  Taken 6/17/2024 2130 by Gabby iGlbert RN  Safety Promotion/Fall Prevention:   assistive device/personal items within reach   activity supervised   clutter free environment maintained   fall prevention program maintained   lighting adjusted   nonskid shoes/slippers when out of bed   room organization consistent   safety round/check completed  Intervention: Prevent and Manage VTE (Venous Thromboembolism) Risk  Recent Flowsheet Documentation  Taken 6/18/2024 0038 by Gabby Gilbert RN  Activity Management: activity encouraged  VTE Prevention/Management: (eliquis) other (see comments)  Taken 6/17/2024 2130 by Gabby Gilbert RN  Activity Management: activity encouraged  VTE Prevention/Management: (eliquis) other (see comments)  Goal: Optimal Comfort and Wellbeing  Outcome: Ongoing, Progressing  Intervention: Provide Person-Centered Care  Recent Flowsheet Documentation  Taken 6/18/2024 0038 by Gabby Gilbert RN  Trust Relationship/Rapport:   care explained   choices provided   emotional support provided   empathic listening provided  Taken 6/17/2024 2130 by Gabby Gilbert RN  Trust Relationship/Rapport:   care explained   choices provided   emotional support provided   empathic listening provided  Goal: Readiness for Transition of Care  Outcome: Ongoing, Progressing  Intervention: Mutually Develop Transition Plan  Recent Flowsheet Documentation  Taken 6/17/2024 2130 by Gabby Gilbert RN  Equipment Currently Used at Home: walker, standard  Transportation  Anticipated: family or friend will provide  Transportation Concerns: none  Patient/Family Anticipated Services at Transition: none  Patient/Family Anticipates Transition to:   home with family   home with help/services

## 2024-06-18 NOTE — PROGRESS NOTES
[facility] Clinical Pharmacy Services: Zosyn Consult    Pt Name: Madelene Naegele   : 1940  Weight: 73.4 kg (161 lb 12.8 oz)  Antibiotic: Zosyn  Indication: Skin and Soft Tissue    Relevant clinical data and objective history reviewed:    History reviewed. No pertinent past medical history.  Creatinine   Date Value Ref Range Status   2024 2.74 (H) 0.57 - 1.00 mg/dL Final   2022 1.09 (H) 0.55 - 1.02 mg/dL Final   2022 1.06 (H) 0.55 - 1.02 mg/dL Final   2022 1.15 (H) 0.55 - 1.02 mg/dL Final   2022 125.0 15 - 500 mg/dL Final     BUN   Date Value Ref Range Status   2024 55 (H) 8 - 23 mg/dL Final   2022 20 10 - 20 mg/dL Final     Estimated Creatinine Clearance: 15.9 mL/min (A) (by C-G formula based on SCr of 2.74 mg/dL (H)).    Lab Results   Component Value Date    WBC 17.82 (H) 2024     Temp Readings from Last 3 Encounters:   24 97.7 °F (36.5 °C) (Oral)      Assessment/Plan    Ordered Zosyn 3.375gm iv q12h extended infusion for a total of 5 days. Will monitor and adjust if culture data or pertinent lab values indicate this is best for the patient.     Thank you for this consult and please contact pharmacy with any questions or concerns.     Mable Wilkinson, Pharm.D., [facility]  Clinical Pharmacist

## 2024-06-19 LAB
ANION GAP SERPL CALCULATED.3IONS-SCNC: 10.1 MMOL/L (ref 5–15)
BASOPHILS # BLD MANUAL: 0.07 10*3/MM3 (ref 0–0.2)
BASOPHILS NFR BLD MANUAL: 1 % (ref 0–1.5)
BILIRUB UR QL STRIP: NEGATIVE
BUN SERPL-MCNC: 30 MG/DL (ref 8–23)
BUN/CREAT SERPL: 21.6 (ref 7–25)
BURR CELLS BLD QL SMEAR: NORMAL
CALCIUM SPEC-SCNC: 8.4 MG/DL (ref 8.6–10.5)
CHLORIDE SERPL-SCNC: 105 MMOL/L (ref 98–107)
CLARITY UR: CLEAR
CO2 SERPL-SCNC: 25.9 MMOL/L (ref 22–29)
COLOR UR: YELLOW
CREAT SERPL-MCNC: 1.39 MG/DL (ref 0.57–1)
DEPRECATED RDW RBC AUTO: 50.2 FL (ref 37–54)
EGFRCR SERPLBLD CKD-EPI 2021: 38 ML/MIN/1.73
EOSINOPHIL # BLD MANUAL: 0.26 10*3/MM3 (ref 0–0.4)
EOSINOPHIL NFR BLD MANUAL: 4 % (ref 0.3–6.2)
ERYTHROCYTE [DISTWIDTH] IN BLOOD BY AUTOMATED COUNT: 13.1 % (ref 12.3–15.4)
GLUCOSE SERPL-MCNC: 126 MG/DL (ref 65–99)
GLUCOSE UR STRIP-MCNC: NEGATIVE MG/DL
HCT VFR BLD AUTO: 35.8 % (ref 34–46.6)
HGB BLD-MCNC: 11.8 G/DL (ref 12–15.9)
HGB UR QL STRIP.AUTO: NEGATIVE
KETONES UR QL STRIP: NEGATIVE
LEUKOCYTE ESTERASE UR QL STRIP.AUTO: NEGATIVE
LYMPHOCYTES # BLD MANUAL: 1.53 10*3/MM3 (ref 0.7–3.1)
LYMPHOCYTES NFR BLD MANUAL: 5.1 % (ref 5–12)
MCH RBC QN AUTO: 34.7 PG (ref 26.6–33)
MCHC RBC AUTO-ENTMCNC: 33 G/DL (ref 31.5–35.7)
MCV RBC AUTO: 105.3 FL (ref 79–97)
MONOCYTES # BLD: 0.34 10*3/MM3 (ref 0.1–0.9)
NEUTROPHILS # BLD AUTO: 4.4 10*3/MM3 (ref 1.7–7)
NEUTROPHILS NFR BLD MANUAL: 66.7 % (ref 42.7–76)
NITRITE UR QL STRIP: NEGATIVE
NRBC BLD AUTO-RTO: 0 /100 WBC (ref 0–0.2)
PH UR STRIP.AUTO: 6 [PH] (ref 5–8)
PLAT MORPH BLD: NORMAL
PLATELET # BLD AUTO: 118 10*3/MM3 (ref 140–450)
PMV BLD AUTO: 11.4 FL (ref 6–12)
POIKILOCYTOSIS BLD QL SMEAR: NORMAL
POTASSIUM SERPL-SCNC: 3.9 MMOL/L (ref 3.5–5.2)
PROT UR QL STRIP: NEGATIVE
RBC # BLD AUTO: 3.4 10*6/MM3 (ref 3.77–5.28)
SODIUM SERPL-SCNC: 141 MMOL/L (ref 136–145)
SP GR UR STRIP: 1.02 (ref 1–1.03)
UROBILINOGEN UR QL STRIP: NORMAL
VARIANT LYMPHS NFR BLD MANUAL: 23.2 % (ref 19.6–45.3)
WBC MORPH BLD: NORMAL
WBC NRBC COR # BLD AUTO: 6.6 10*3/MM3 (ref 3.4–10.8)

## 2024-06-19 PROCEDURE — 80048 BASIC METABOLIC PNL TOTAL CA: CPT | Performed by: INTERNAL MEDICINE

## 2024-06-19 PROCEDURE — 85025 COMPLETE CBC W/AUTO DIFF WBC: CPT | Performed by: INTERNAL MEDICINE

## 2024-06-19 PROCEDURE — 97530 THERAPEUTIC ACTIVITIES: CPT

## 2024-06-19 PROCEDURE — 97162 PT EVAL MOD COMPLEX 30 MIN: CPT

## 2024-06-19 PROCEDURE — 81003 URINALYSIS AUTO W/O SCOPE: CPT | Performed by: PHYSICIAN ASSISTANT

## 2024-06-19 PROCEDURE — 85007 BL SMEAR W/DIFF WBC COUNT: CPT | Performed by: INTERNAL MEDICINE

## 2024-06-19 PROCEDURE — 25810000003 SODIUM CHLORIDE 0.9 % SOLUTION: Performed by: NURSE PRACTITIONER

## 2024-06-19 PROCEDURE — 25010000002 PIPERACILLIN SOD-TAZOBACTAM PER 1 G: Performed by: INTERNAL MEDICINE

## 2024-06-19 RX ADMIN — APIXABAN 5 MG: 5 TABLET, FILM COATED ORAL at 10:01

## 2024-06-19 RX ADMIN — DILTIAZEM HYDROCHLORIDE 240 MG: 240 CAPSULE, COATED, EXTENDED RELEASE ORAL at 10:01

## 2024-06-19 RX ADMIN — GABAPENTIN 100 MG: 100 CAPSULE ORAL at 10:01

## 2024-06-19 RX ADMIN — Medication 10 ML: at 10:02

## 2024-06-19 RX ADMIN — APIXABAN 5 MG: 5 TABLET, FILM COATED ORAL at 20:20

## 2024-06-19 RX ADMIN — DOXYCYCLINE 100 MG: 100 CAPSULE ORAL at 20:20

## 2024-06-19 RX ADMIN — PIPERACILLIN AND TAZOBACTAM 3.38 G: 3; .375 INJECTION, POWDER, FOR SOLUTION INTRAVENOUS at 20:19

## 2024-06-19 RX ADMIN — DIVALPROEX SODIUM 125 MG: 125 TABLET, DELAYED RELEASE ORAL at 10:01

## 2024-06-19 RX ADMIN — ATORVASTATIN CALCIUM 40 MG: 20 TABLET, FILM COATED ORAL at 10:01

## 2024-06-19 RX ADMIN — SERTRALINE 25 MG: 25 TABLET, FILM COATED ORAL at 10:01

## 2024-06-19 RX ADMIN — SODIUM CHLORIDE 100 ML/HR: 9 INJECTION, SOLUTION INTRAVENOUS at 13:50

## 2024-06-19 RX ADMIN — PIPERACILLIN AND TAZOBACTAM 3.38 G: 3; .375 INJECTION, POWDER, FOR SOLUTION INTRAVENOUS at 04:17

## 2024-06-19 RX ADMIN — ASPIRIN 81 MG: 81 TABLET, COATED ORAL at 10:01

## 2024-06-19 RX ADMIN — GABAPENTIN 100 MG: 100 CAPSULE ORAL at 20:20

## 2024-06-19 RX ADMIN — PIPERACILLIN AND TAZOBACTAM 3.38 G: 3; .375 INJECTION, POWDER, FOR SOLUTION INTRAVENOUS at 13:50

## 2024-06-19 RX ADMIN — DIVALPROEX SODIUM 125 MG: 125 TABLET, DELAYED RELEASE ORAL at 20:20

## 2024-06-19 RX ADMIN — DOXYCYCLINE 100 MG: 100 CAPSULE ORAL at 10:01

## 2024-06-19 NOTE — PLAN OF CARE
Goal Outcome Evaluation:  Plan of Care Reviewed With: patient        Progress: improving  Outcome Evaluation: Patient AOx 4 able to ambulate to the toilet with assistant x 1 continent with anbibiotic therapy due to cellulites after cat scrash her left lower extremity good appetite.

## 2024-06-19 NOTE — PLAN OF CARE
Goal Outcome Evaluation:  Plan of Care Reviewed With: patient           Outcome Evaluation: Pt. is an 83 year old Female admitted to the hospital with LLE Cellulitis.  Pt. reports that prior to admission she was independent with functional mobility and used no A.D. during ambulation although she has a Rwx for use if needed. Pt. currently presents with decreased strength, decreased balance, and decreased tolerance to functional activity. This AM, pt. able to ambulate 50 feet, CGA x 1, with use of Rwx.  Pt. requires CGA x 1 for sit <-> stand transfers. Verbal/tactile cues given during ambulation for posture correction.  Pt. will benefit from skilled inpt. P.T. to address his functional deficits and to assist pt. in regaining his maximum level of independence with functional mobility.      Anticipated Discharge Disposition (PT): home with assist, home with home health

## 2024-06-19 NOTE — PLAN OF CARE
Goal Outcome Evaluation:  Plan of Care Reviewed With: patient        Progress: improving  Outcome Evaluation: Drsg in place on LLE. Denies pain. Abx given per MAR. Up with stand by assist to bathroom.

## 2024-06-19 NOTE — PROGRESS NOTES
Name: Madelene Naegele ADMIT: 2024   : 1940  PCP: Ava Sebastian MD    MRN: [id number] LOS: 2 days   AGE/SEX: 83 y.o. female  ROOM: [facility]     Subjective   Subjective   Patient is lying in bed and does not appear to have any major distress.  Denies nausea, vomiting, abdominal pain, chest pain.       Objective   Objective   Vital Signs  Temp:  [97.3 °F (36.3 °C)-98.2 °F (36.8 °C)] 97.3 °F (36.3 °C)  Heart Rate:  [112-141] 115  Resp:  [18] 18  BP: (101-120)/(79-86) 117/86  SpO2:  [93 %] 93 %  on   ;   Device (Oxygen Therapy): room air  Body mass index is 26.92 kg/m².  Physical Exam  HEENT: PERRLA, extraocular movements intact, Scleras no icterus  Neck: Supple, no JVD  Cardiovascular: Regular rate and rhythm with normal S1 and S2.  Respiratory: Fairly clear to auscultation bilaterally with no wheezes  GI: Soft, nontender, bowel sounds present  Extremities: No edema, palpable pulses, left leg redness, erythema, edema has been improving  Neurologic: Grossly nonfocal, no facial asymmetry    Results Review     I reviewed the patient's new clinical results.  Results from last 7 days   Lab Units 24  03124  0355 24  1518   WBC 10*3/mm3 6.60 11.92* 17.82*   HEMOGLOBIN g/dL 11.8* 11.8* 14.1   PLATELETS 10*3/mm3 118* 109* 132*     Results from last 7 days   Lab Units 24  0316 24  0355 24  1518   SODIUM mmol/L 141 139 133*   POTASSIUM mmol/L 3.9 3.9 4.5   CHLORIDE mmol/L 105 102 92*   CO2 mmol/L 25.9 27.0 28.2   BUN mg/dL 30* 48* 55*   CREATININE mg/dL 1.39* 2.09* 2.74*   GLUCOSE mg/dL 126* 129* 160*   EGFR mL/min/1.73 38.0* 23.3* 16.8*     Results from last 7 days   Lab Units 24  1518   ALBUMIN g/dL 3.7   BILIRUBIN mg/dL 1.1   ALK PHOS U/L 46   AST (SGOT) U/L 31   ALT (SGPT) U/L 12     Results from last 7 days   Lab Units 24  0316 24  0355 24  1518   CALCIUM mg/dL 8.4* 7.7* 9.4   ALBUMIN g/dL  --   --  3.7     Results from last 7 days   Lab Units  06/17/24  2304 06/17/24  1905 06/17/24  1518   LACTATE mmol/L 1.4 2.1* 3.5*     Glucose   Date/Time Value Ref Range Status   06/17/2024 2228 135 (H) 70 - 130 mg/dL Final       No radiology results for the last day    I have personally reviewed all medications:  Scheduled Medications  apixaban, 5 mg, Oral, BID  aspirin, 81 mg, Oral, Daily  atorvastatin, 40 mg, Oral, Daily  dilTIAZem CD, 240 mg, Oral, Daily  divalproex, 125 mg, Oral, BID  doxycycline, 100 mg, Oral, Q12H  fluorometholone, 1 drop, Both Eyes, 4x Daily  gabapentin, 100 mg, Oral, TID  metoprolol succinate XL, 50 mg, Oral, Q24H  piperacillin-tazobactam, 3.375 g, Intravenous, Q8H  sertraline, 25 mg, Oral, Daily  sodium chloride, 10 mL, Intravenous, Q12H    Infusions  sodium chloride, 100 mL/hr, Last Rate: 100 mL/hr (06/19/24 1350)    Diet  Diet: Regular/House; Fluid Consistency: Thin (IDDSI 0)    I have personally reviewed:  [x]  Laboratory   [x]  Microbiology   [x]  Radiology   [x]  EKG/Telemetry  [x]  Cardiology/Vascular   []  Pathology    []  Records       Assessment/Plan     Active Hospital Problems    Diagnosis  POA    **Cellulitis of left leg [L03.116]  Yes    HTN (hypertension) [I10]  Unknown    Atrial fibrillation, persistent [I48.19]  Unknown    Hyperlipidemia [E78.5]  Unknown    History of stroke [Z86.73]  Not Applicable    CAD (coronary artery disease) [I25.10]  Unknown    Type 2 diabetes mellitus, without long-term current use of insulin [E11.9]  Unknown    Severe sepsis [A41.9, R65.20]  Unknown      Resolved Hospital Problems   No resolved problems to display.       83 y.o. female admitted with Cellulitis of left leg.    1. Left leg cellulitis/cat scratch, currently on IV Zosyn and improving clinically.  WBC is normal and patient is afebrile.  2.  History of atrial fibrillation, on aspirin, Eliquis, Cardizem and metoprolol which will continued.  3.  Neuropathy, Neurontin.  4.  Hyperlipidemia, on statins.  5.  History of coronary artery disease,  continue with aspirin, metoprolol and statins.  6.  DVT prophylaxis, on Eliquis.  7.  CODE STATUS is full code.  Expected Discharge Date: 6/20/2024; Expected Discharge Time:       Damon Mohan MD  [facility]  06/19/24  15:56 EDT

## 2024-06-19 NOTE — THERAPY EVALUATION
Patient Name: Madelene Naegele  : 1940    MRN: [id number]                              Today's Date: 2024       Admit Date: 2024    Visit Dx:     ICD-10-CM ICD-9-CM   1. Septic shock  A41.9 038.9    R65.21 785.52     995.92   2. Cellulitis of left lower extremity  L03.116 682.6     Patient Active Problem List   Diagnosis    Cellulitis of left leg    Severe sepsis    HTN (hypertension)    Atrial fibrillation, persistent    Hyperlipidemia    History of stroke    CAD (coronary artery disease)    Type 2 diabetes mellitus, without long-term current use of insulin     Past Medical History:   Diagnosis Date    CAD (coronary artery disease)     History of stroke     Hyperlipidemia     Hypertension     Persistent atrial fibrillation     Type 2 diabetes mellitus      History reviewed. No pertinent surgical history.   General Information       Row Name 24 1138          Physical Therapy Time and Intention    Document Type evaluation  Pt. admitted with Left L.E. Cellulitis  -MS     Mode of Treatment physical therapy;individual therapy  -MS       Row Name 24 1138          General Information    Patient Profile Reviewed yes  -MS     Prior Level of Function independent:  -MS     Existing Precautions/Restrictions fall   -MS     Barriers to Rehab none identified  -MS       Row Name 24 1138          Cognition    Orientation Status (Cognition) oriented x 3  -MS       Row Name 24 1138          Safety Issues, Functional Mobility    Comment, Safety Issues/Impairments (Mobility) Gait belt used for safety.  -MS               User Key  (r) = Recorded By, (t) = Taken By, (c) = Cosigned By      Initials Name Provider Type    MS Javi Greer, PT Physical Therapist                   Mobility       Row Name 24 1139          Bed Mobility    Bed Mobility supine-sit;sit-supine  -MS     Comment, (Bed Mobility) Pt. up in chair this AM.  -MS       Row Name 24 1139          Sit-Stand Transfer     Sit-Stand [place] (Transfers) contact guard  -MS     Assistive Device (Sit-Stand Transfers) walker, front-wheeled  -MS       Row Name 06/19/24 1139          Gait/Stairs (Locomotion)    [place] Level (Gait) contact guard  -MS     Assistive Device (Gait) walker, front-wheeled  -MS     Distance in Feet (Gait) 50  -MS     Deviations/Abnormal Patterns (Gait) kelsey decreased  -MS     Bilateral Gait Deviations forward flexed posture  -MS     Comment, (Gait/Stairs) Verbal/tactile cues given for posture correction.  -MS               User Key  (r) = Recorded By, (t) = Taken By, (c) = Cosigned By      Initials Name Provider Type    Javi Geller CINDY, PT Physical Therapist                   Obj/Interventions       Row Name 06/19/24 1140          Range of Motion Comprehensive    Comment, General Range of Motion BUE/LE (WFL's)  -MS       Row Name 06/19/24 1140          Strength Comprehensive (MMT)    Comment, General Manual Muscle Testing (MMT) Assessment BUE/LE (3+/5)  -MS               User Key  (r) = Recorded By, (t) = Taken By, (c) = Cosigned By      Initials Name Provider Type    Javi Geller L, PT Physical Therapist                   Goals/Plan       Row Name 06/19/24 1143          Bed Mobility Goal 1 (PT)    Activity/Assistive Device (Bed Mobility Goal 1, PT) bed mobility activities, all  -MS     [place] Level/Cues Needed (Bed Mobility Goal 1, PT) standby assist  -MS     Time Frame (Bed Mobility Goal 1, PT) long term goal (LTG);1 week  -MS       Row Name 06/19/24 1143          Transfer Goal 1 (PT)    Activity/Assistive Device (Transfer Goal 1, PT) transfers, all  -MS     [place] Level/Cues Needed (Transfer Goal 1, PT) standby assist  -MS     Time Frame (Transfer Goal 1, PT) long term goal (LTG);1 week  -MS       Row Name 06/19/24 1143          Gait Training Goal 1 (PT)    Activity/Assistive Device (Gait Training Goal 1, PT) gait (walking locomotion)  With or without AAD  -MS      [place] Level (Gait Training Goal 1, PT) standby assist  -MS     Distance (Gait Training Goal 1, PT) 150 feet  -MS     Time Frame (Gait Training Goal 1, PT) long term goal (LTG);1 week  -MS       Row Name 06/19/24 1143          Therapy Assessment/Plan (PT)    Planned Therapy Interventions (PT) balance training;bed mobility training;gait training;home exercise program;patient/family education;postural re-education;transfer training;strengthening  -MS               User Key  (r) = Recorded By, (t) = Taken By, (c) = Cosigned By      Initials Name Provider Type    Javi Geller, PT Physical Therapist                   Clinical Impression       Row Name 06/19/24 1141          Pain    Pretreatment Pain Rating 0/10 - no pain  -MS     Posttreatment Pain Rating 0/10 - no pain  -MS       Row Name 06/19/24 1141          Plan of Care Review    Plan of Care Reviewed With patient  -MS       Row Name 06/19/24 1141          Therapy Assessment/Plan (PT)    Rehab Potential (PT) good, to achieve stated therapy goals  -MS     Criteria for Skilled Interventions Met (PT) skilled treatment is necessary  -MS     Therapy Frequency (PT) 6 times/wk  -MS       Row Name 06/19/24 1141          Positioning and Restraints    Pre-Treatment Position sitting in chair/recliner  -MS     Post Treatment Position chair  -MS     In Chair notified nsg;reclined;sitting;call light within reach;encouraged to call for assist  All lines intact.  -MS               User Key  (r) = Recorded By, (t) = Taken By, (c) = Cosigned By      Initials Name Provider Type    Javi Geller, PT Physical Therapist                   Outcome Measures       Row Name 06/19/24 1143          How much help from another person do you currently need...    Turning from your back to your side while in flat bed without using bedrails? 3  -MS     Moving from lying on back to sitting on the side of a flat bed without bedrails? 3  -MS     Moving to and from a bed to a chair  (including a wheelchair)? 3  -MS     Standing up from a chair using your arms (e.g., wheelchair, bedside chair)? 3  -MS     Climbing 3-5 steps with a railing? 3  -MS     To walk in hospital room? 3  -MS     AM-PAC 6 Clicks Score (PT) 18  -MS     Highest Level of Mobility Goal 6 --> Walk 10 steps or more  -MS       Row Name 06/19/24 1143          Functional Assessment    Outcome Measure Options AM-PAC 6 Clicks Basic Mobility (PT)  -MS               User Key  (r) = Recorded By, (t) = Taken By, (c) = Cosigned By      Initials Name Provider Type    MS Javi Greer, PT Physical Therapist                                 Physical Therapy Education       Title: PT OT SLP Therapies (In Progress)       Topic: Physical Therapy (In Progress)       Point: Mobility training (Done)       Learning Progress Summary             Patient Acceptance, E,D, VU,NR by MS at 6/19/2024 1144                         Point: Home exercise program (Not Started)       Learner Progress:  Not documented in this visit.              Point: Body mechanics (Done)       Learning Progress Summary             Patient Acceptance, E,D, VU,NR by MS at 6/19/2024 1144                         Point: Precautions (Done)       Learning Progress Summary             Patient Acceptance, E,D, VU,NR by MS at 6/19/2024 1144                                         User Key       Initials Effective Dates Name Provider Type Discipline    MS 06/16/21 -  Javi Greer, PT Physical Therapist PT                  PT Recommendation and Plan  Planned Therapy Interventions (PT): balance training, bed mobility training, gait training, home exercise program, patient/family education, postural re-education, transfer training, strengthening  Plan of Care Reviewed With: patient  Outcome Evaluation: Pt. is an 83 year old Female admitted to the hospital with LLE Cellulitis.  Pt. reports that prior to admission she was independent with functional mobility and used no A.D. during  ambulation although she has a Rwx for use if needed. Pt. currently presents with decreased strength, decreased balance, and decreased tolerance to functional activity. This AM, pt. able to ambulate 50 feet, CGA x 1, with use of Rwx.  Pt. requires CGA x 1 for sit <-> stand transfers. Verbal/tactile cues given during ambulation for posture correction.  Pt. will benefit from skilled inpt. P.T. to address his functional deficits and to assist pt. in regaining his maximum level of independence with functional mobility.     Time Calculation:         PT Charges       Row Name 06/19/24 1144             Time Calculation    Start Time 0805  -MS      Stop Time 0819  -MS      Time Calculation (min) 14 min  -MS      PT Received On 06/19/24  -MS      PT - Next Appointment 06/20/24  -MS      PT Goal Re-Cert Due Date 06/26/24  -MS         Time Calculation- PT    Total Timed Code Minutes- PT 13 minute(s)  -MS                User Key  (r) = Recorded By, (t) = Taken By, (c) = Cosigned By      Initials Name Provider Type    Javi Geller, PT Physical Therapist                  Therapy Charges for Today       Code Description Service Date Service Provider Modifiers Qty    [id number]  PT EVAL MOD COMPLEXITY 2 6/19/2024 Javi Greer, PT GP 1    [id number]  PT THERAPEUTIC ACT EA 15 MIN 6/19/2024 Javi Greer, PT GP 1            PT G-Codes  Outcome Measure Options: AM-PAC 6 Clicks Basic Mobility (PT)  AM-PAC 6 Clicks Score (PT): 18  PT Discharge Summary  Anticipated Discharge Disposition (PT): home with assist, home with home health    Javi Greer, PT  6/19/2024

## 2024-06-20 LAB
ANION GAP SERPL CALCULATED.3IONS-SCNC: 9.6 MMOL/L (ref 5–15)
BASOPHILS # BLD AUTO: 0.03 10*3/MM3 (ref 0–0.2)
BASOPHILS NFR BLD AUTO: 0.5 % (ref 0–1.5)
BUN SERPL-MCNC: 19 MG/DL (ref 8–23)
BUN/CREAT SERPL: 18.4 (ref 7–25)
CALCIUM SPEC-SCNC: 7.9 MG/DL (ref 8.6–10.5)
CHLORIDE SERPL-SCNC: 108 MMOL/L (ref 98–107)
CO2 SERPL-SCNC: 21.4 MMOL/L (ref 22–29)
CREAT SERPL-MCNC: 1.03 MG/DL (ref 0.57–1)
DEPRECATED RDW RBC AUTO: 49 FL (ref 37–54)
EGFRCR SERPLBLD CKD-EPI 2021: 54.4 ML/MIN/1.73
EOSINOPHIL # BLD AUTO: 0.27 10*3/MM3 (ref 0–0.4)
EOSINOPHIL NFR BLD AUTO: 4.4 % (ref 0.3–6.2)
ERYTHROCYTE [DISTWIDTH] IN BLOOD BY AUTOMATED COUNT: 13.1 % (ref 12.3–15.4)
GLUCOSE BLDC GLUCOMTR-MCNC: 175 MG/DL (ref 70–130)
GLUCOSE BLDC GLUCOMTR-MCNC: 206 MG/DL (ref 70–130)
GLUCOSE SERPL-MCNC: 139 MG/DL (ref 65–99)
HCT VFR BLD AUTO: 35.2 % (ref 34–46.6)
HGB BLD-MCNC: 11.9 G/DL (ref 12–15.9)
IMM GRANULOCYTES # BLD AUTO: 0.06 10*3/MM3 (ref 0–0.05)
IMM GRANULOCYTES NFR BLD AUTO: 1 % (ref 0–0.5)
LYMPHOCYTES # BLD AUTO: 1.88 10*3/MM3 (ref 0.7–3.1)
LYMPHOCYTES NFR BLD AUTO: 30.5 % (ref 19.6–45.3)
MCH RBC QN AUTO: 34.9 PG (ref 26.6–33)
MCHC RBC AUTO-ENTMCNC: 33.8 G/DL (ref 31.5–35.7)
MCV RBC AUTO: 103.2 FL (ref 79–97)
MONOCYTES # BLD AUTO: 0.6 10*3/MM3 (ref 0.1–0.9)
MONOCYTES NFR BLD AUTO: 9.7 % (ref 5–12)
NEUTROPHILS NFR BLD AUTO: 3.33 10*3/MM3 (ref 1.7–7)
NEUTROPHILS NFR BLD AUTO: 53.9 % (ref 42.7–76)
NRBC BLD AUTO-RTO: 0 /100 WBC (ref 0–0.2)
PLATELET # BLD AUTO: 111 10*3/MM3 (ref 140–450)
PMV BLD AUTO: 10.8 FL (ref 6–12)
POTASSIUM SERPL-SCNC: 3.4 MMOL/L (ref 3.5–5.2)
RBC # BLD AUTO: 3.41 10*6/MM3 (ref 3.77–5.28)
SODIUM SERPL-SCNC: 139 MMOL/L (ref 136–145)
WBC NRBC COR # BLD AUTO: 6.17 10*3/MM3 (ref 3.4–10.8)

## 2024-06-20 PROCEDURE — 82948 REAGENT STRIP/BLOOD GLUCOSE: CPT

## 2024-06-20 PROCEDURE — 25010000002 PIPERACILLIN SOD-TAZOBACTAM PER 1 G: Performed by: INTERNAL MEDICINE

## 2024-06-20 PROCEDURE — 85025 COMPLETE CBC W/AUTO DIFF WBC: CPT | Performed by: INTERNAL MEDICINE

## 2024-06-20 PROCEDURE — 80048 BASIC METABOLIC PNL TOTAL CA: CPT | Performed by: INTERNAL MEDICINE

## 2024-06-20 PROCEDURE — 99222 1ST HOSP IP/OBS MODERATE 55: CPT | Performed by: INTERNAL MEDICINE

## 2024-06-20 PROCEDURE — 25810000003 SODIUM CHLORIDE 0.9 % SOLUTION: Performed by: NURSE PRACTITIONER

## 2024-06-20 RX ORDER — AMOXICILLIN AND CLAVULANATE POTASSIUM 875; 125 MG/1; MG/1
1 TABLET, FILM COATED ORAL 2 TIMES DAILY
Qty: 10 TABLET | Refills: 0 | Status: SHIPPED | OUTPATIENT
Start: 2024-06-20 | End: 2024-06-25

## 2024-06-20 RX ORDER — DOXYCYCLINE 100 MG/1
100 CAPSULE ORAL EVERY 12 HOURS SCHEDULED
Qty: 10 CAPSULE | Refills: 0 | Status: SHIPPED | OUTPATIENT
Start: 2024-06-20 | End: 2024-06-25

## 2024-06-20 RX ORDER — DILTIAZEM HYDROCHLORIDE 240 MG/1
240 CAPSULE, COATED, EXTENDED RELEASE ORAL 2 TIMES DAILY
Status: DISCONTINUED | OUTPATIENT
Start: 2024-06-20 | End: 2024-06-23 | Stop reason: HOSPADM

## 2024-06-20 RX ORDER — AMOXICILLIN AND CLAVULANATE POTASSIUM 875; 125 MG/1; MG/1
1 TABLET, FILM COATED ORAL EVERY 12 HOURS SCHEDULED
Status: DISCONTINUED | OUTPATIENT
Start: 2024-06-20 | End: 2024-06-23 | Stop reason: HOSPADM

## 2024-06-20 RX ADMIN — METOPROLOL SUCCINATE 50 MG: 50 TABLET, FILM COATED, EXTENDED RELEASE ORAL at 08:19

## 2024-06-20 RX ADMIN — DILTIAZEM HYDROCHLORIDE 240 MG: 240 CAPSULE, COATED, EXTENDED RELEASE ORAL at 08:19

## 2024-06-20 RX ADMIN — DIVALPROEX SODIUM 125 MG: 125 TABLET, DELAYED RELEASE ORAL at 21:58

## 2024-06-20 RX ADMIN — APIXABAN 5 MG: 5 TABLET, FILM COATED ORAL at 21:57

## 2024-06-20 RX ADMIN — DOXYCYCLINE 100 MG: 100 CAPSULE ORAL at 08:19

## 2024-06-20 RX ADMIN — GABAPENTIN 100 MG: 100 CAPSULE ORAL at 17:28

## 2024-06-20 RX ADMIN — SODIUM CHLORIDE 100 ML/HR: 9 INJECTION, SOLUTION INTRAVENOUS at 12:50

## 2024-06-20 RX ADMIN — DIVALPROEX SODIUM 125 MG: 125 TABLET, DELAYED RELEASE ORAL at 08:19

## 2024-06-20 RX ADMIN — GABAPENTIN 100 MG: 100 CAPSULE ORAL at 21:57

## 2024-06-20 RX ADMIN — PIPERACILLIN AND TAZOBACTAM 3.38 G: 3; .375 INJECTION, POWDER, FOR SOLUTION INTRAVENOUS at 04:25

## 2024-06-20 RX ADMIN — AMOXICILLIN AND CLAVULANATE POTASSIUM 1 TABLET: 875; 125 TABLET, FILM COATED ORAL at 12:50

## 2024-06-20 RX ADMIN — Medication 10 ML: at 08:20

## 2024-06-20 RX ADMIN — ATORVASTATIN CALCIUM 40 MG: 20 TABLET, FILM COATED ORAL at 08:19

## 2024-06-20 RX ADMIN — Medication 10 ML: at 21:59

## 2024-06-20 RX ADMIN — SERTRALINE 25 MG: 25 TABLET, FILM COATED ORAL at 08:19

## 2024-06-20 RX ADMIN — ASPIRIN 81 MG: 81 TABLET, COATED ORAL at 08:19

## 2024-06-20 RX ADMIN — DOXYCYCLINE 100 MG: 100 CAPSULE ORAL at 21:57

## 2024-06-20 RX ADMIN — DILTIAZEM HYDROCHLORIDE 240 MG: 240 CAPSULE, COATED, EXTENDED RELEASE ORAL at 21:57

## 2024-06-20 RX ADMIN — AMOXICILLIN AND CLAVULANATE POTASSIUM 1 TABLET: 875; 125 TABLET, FILM COATED ORAL at 21:56

## 2024-06-20 RX ADMIN — SODIUM CHLORIDE 100 ML/HR: 9 INJECTION, SOLUTION INTRAVENOUS at 22:04

## 2024-06-20 RX ADMIN — GABAPENTIN 100 MG: 100 CAPSULE ORAL at 08:19

## 2024-06-20 RX ADMIN — APIXABAN 5 MG: 5 TABLET, FILM COATED ORAL at 08:19

## 2024-06-20 NOTE — PROGRESS NOTES
Name: Madelene Naegele ADMIT: 2024   : 1940  PCP: Ava Sebastian MD    MRN: [id number] LOS: 3 days   AGE/SEX: 83 y.o. female  ROOM: [facility]     Subjective   Subjective   Patient is lying in bed and does not appear to have any major distress.  Denies nausea, vomiting, abdominal pain, chest pain.  Today heart rate is on the rise and in A-fib with RVR.       Objective   Objective   Vital Signs  Temp:  [97.6 °F (36.4 °C)-97.9 °F (36.6 °C)] 97.9 °F (36.6 °C)  Heart Rate:  [] 81  Resp:  [18] 18  BP: ()/(72-99) 96/72  SpO2:  [97 %-100 %] 100 %  on   ;   Device (Oxygen Therapy): room air  Body mass index is 26.92 kg/m².  Physical Exam  HEENT: PERRLA, extraocular movements intact, Scleras no icterus  Neck: Supple, no JVD  Cardiovascular: Irregular/tachycardic with normal S1 and S2.  Respiratory: Fairly clear to auscultation bilaterally with no wheezes  GI: Soft, nontender, bowel sounds present  Extremities: No edema, palpable pulses, left leg redness, erythema, edema has been improving  Neurologic: Grossly nonfocal, no facial asymmetry    Results Review     I reviewed the patient's new clinical results.  Results from last 7 days   Lab Units 24  03324  0355 24  1518   WBC 10*3/mm3 6.17 6.60 11.92* 17.82*   HEMOGLOBIN g/dL 11.9* 11.8* 11.8* 14.1   PLATELETS 10*3/mm3 111* 118* 109* 132*     Results from last 7 days   Lab Units 24  03324  0316 24  0355 24  1518   SODIUM mmol/L 139 141 139 133*   POTASSIUM mmol/L 3.4* 3.9 3.9 4.5   CHLORIDE mmol/L 108* 105 102 92*   CO2 mmol/L 21.4* 25.9 27.0 28.2   BUN mg/dL 19 30* 48* 55*   CREATININE mg/dL 1.03* 1.39* 2.09* 2.74*   GLUCOSE mg/dL 139* 126* 129* 160*   EGFR mL/min/1.73 54.4* 38.0* 23.3* 16.8*     Results from last 7 days   Lab Units 24  1518   ALBUMIN g/dL 3.7   BILIRUBIN mg/dL 1.1   ALK PHOS U/L 46   AST (SGOT) U/L 31   ALT (SGPT) U/L 12     Results from last 7 days   Lab Units  06/20/24  0335 06/19/24  0316 06/18/24  0355 06/17/24  1518   CALCIUM mg/dL 7.9* 8.4* 7.7* 9.4   ALBUMIN g/dL  --   --   --  3.7     Results from last 7 days   Lab Units 06/17/24  2304 06/17/24  1905 06/17/24  1518   LACTATE mmol/L 1.4 2.1* 3.5*     Glucose   Date/Time Value Ref Range Status   06/20/2024 1626 175 (H) 70 - 130 mg/dL Final   06/20/2024 1119 206 (H) 70 - 130 mg/dL Final   06/17/2024 2228 135 (H) 70 - 130 mg/dL Final       No radiology results for the last day    I have personally reviewed all medications:  Scheduled Medications  amoxicillin-clavulanate, 1 tablet, Oral, Q12H  apixaban, 5 mg, Oral, BID  aspirin, 81 mg, Oral, Daily  atorvastatin, 40 mg, Oral, Daily  dilTIAZem CD, 240 mg, Oral, BID  divalproex, 125 mg, Oral, BID  doxycycline, 100 mg, Oral, Q12H  fluorometholone, 1 drop, Both Eyes, 4x Daily  gabapentin, 100 mg, Oral, TID  metoprolol succinate XL, 50 mg, Oral, Q24H  sertraline, 25 mg, Oral, Daily  sodium chloride, 10 mL, Intravenous, Q12H    Infusions  sodium chloride, 100 mL/hr, Last Rate: 100 mL/hr (06/20/24 1250)    Diet  Diet: Regular/House; Fluid Consistency: Thin (IDDSI 0)    I have personally reviewed:  [x]  Laboratory   [x]  Microbiology   [x]  Radiology   [x]  EKG/Telemetry  [x]  Cardiology/Vascular   []  Pathology    []  Records       Assessment/Plan     Active Hospital Problems    Diagnosis  POA    **Cellulitis of left leg [L03.116]  Yes    HTN (hypertension) [I10]  Unknown    Atrial fibrillation, persistent [I48.19]  Unknown    Hyperlipidemia [E78.5]  Unknown    History of stroke [Z86.73]  Not Applicable    CAD (coronary artery disease) [I25.10]  Unknown    Type 2 diabetes mellitus, without long-term current use of insulin [E11.9]  Unknown    Severe sepsis [A41.9, R65.20]  Unknown      Resolved Hospital Problems   No resolved problems to display.       83 y.o. female admitted with Cellulitis of left leg.    1. Left leg cellulitis/cat scratch, was on IV Zosyn and is being  transitioned to Augmentin and will continue with doxycycline.  WBC is normal and patient is afebrile and left leg cellulitis is significantly better today.  2.  History of atrial fibrillation, on aspirin, Eliquis, Cardizem and metoprolol which will continued.  Of note today patient is in RVR therefore cardiology consult is being obtained.  3.  Neuropathy, Neurontin.  4.  Hyperlipidemia, on statins.  5.  History of coronary artery disease, continue with aspirin, metoprolol and statins.  6.  DVT prophylaxis, on Eliquis.  7.  CODE STATUS is full code.    Copied text on this note has been reviewed by me on 6/20/2024    Estimated discharge date, 6/21/2024      Damon Mohan MD  [facility]  06/20/24  16:42 EDT

## 2024-06-20 NOTE — DISCHARGE PLACEMENT REQUEST
"Naegele, Madelene (83 y.o. Female)       Date of Birth   1940    Social Security Number       Address   [address] [place] [address]    Home Phone   [phone]    MRN   [id number]       [employer]   [employer]    Marital Status   Single                            Admission Date   6/17/24    Admission Type   Urgent    Admitting Provider   Damon Mohan MD    Attending Provider   Damon Mohan MD    Department, Room/Bed   [address], S414/1       Discharge Date       Discharge Disposition       Discharge Destination                                 Attending Provider: Damon Mohan MD    Allergies: No Known Allergies    Isolation: None   Infection: None   Code Status: CPR    Ht: 165.1 cm (65\")   Wt: 73.4 kg (161 lb 12.8 oz)    Admission Cmt: None   Principal Problem: Cellulitis of left leg [L03.116]                   Active Insurance as of 6/17/2024       Primary Coverage       Payor Plan Insurance Group Employer/Plan Group    [facility] MEDICARE REPLACEMENT [facility] MED ADV GROUP [id number]       Payor Plan Address Payor Plan Phone Number Payor Plan Fax Number Effective Dates    [address] [address]   1/1/2024 - None Entered    [facility] [id number]         Subscriber Name Subscriber Birth Date Member ID       NAEGELE,MADELENE 1940 [id number]                     Emergency Contacts        (Rel.) Home Phone Work Phone Mobile Phone    CAMILLE BRANDON ([facility]) (Other) [phone] -- --                "

## 2024-06-20 NOTE — CASE MANAGEMENT/SOCIAL WORK
Continued Stay Note  [facility]     Patient Name: Madelene Naegele  MRN: [id number]  Today's Date: 6/20/2024    Admit Date: 6/17/2024    Plan: Home with caregivers, [facility]   Discharge Plan       Row Name 06/20/24 1226       Plan    Plan Home with caregivers, [facility]    Patient/Family in Agreement with Plan yes    Plan Comments CCP s/w [facility], via phone, to discuss DC. CCP explained PT recs for HH at [facility]. [facility] is agreeable to HH and declines preference of agency. [facility] notified and pt has been accepted. Alex CONWAY/CCP                   Discharge Codes    No documentation.                 Expected Discharge Date and Time       Expected Discharge Date Expected Discharge Time    Jun 20, 2024               Flores Schuster RN

## 2024-06-20 NOTE — PLAN OF CARE
Goal Outcome Evaluation:  Plan of Care Reviewed With: patient        Progress: improving  Outcome Evaluation: Rested in bed overnight. Dressing c/d/i. Stand by assist to bathroom. IV fluids and abx continue.

## 2024-06-20 NOTE — CONSULTS
Date of Hospital Visit: 24  Encounter Provider: Dali Heck RN  Place of Service: [facility] CARDIOLOGY  Patient Name: Madelene Naegele  :1940  Referral Provider: Dr Mohan    Chief complaint : leg infection    History of Present Illness Madelene Naegele is a 83 year old woman who follows with [facility].  She has history of chronic atrial fibrillation, nonobstructive coronary artery disease, mild-mod mitral regurgitation, moderately-severe TR, mild LV systolic dysfunction (EF 40-45%), hypertension, and previous stroke.  She was on diltiazem and digoxin for rate control but digoxin was stopped earlier this year due to increasing creatinine.  When it was stopped, metoprolol was added.  She is asymptomatic from the standpoint of her atrial fibrillation.    She appears to have a little bit of memory deficit.  A caregiver is in the room with her, and her POA, Tomi, is on the phone on speaker phone.    She presented to the emergency department 3 days ago with worsening redness, swelling, and pain of her left leg.  Her cat had scratched her on her left shin a few days prior to that.  She was hypotensive upon arrival with a systolic blood pressure of 75 mmHg.  She was started on IV antibiotics and fluids.  She was transferred here and had been improving.    Her rate control medications at home include long-acting diltiazem 240 mg twice daily, and metoprolol succinate 50 mg daily.  Due to low blood pressure, these had to be held initially, and then the diltiazem was resumed at 240 mg daily and she was placed on metoprolol tartrate 25 mg.  This morning, her heart rate was noted to be high and we were consulted.  She is completely asymptomatic and denies chest pain, shortness of breath, lightheadedness, or palpitations.          ECHO 24  Summary:                 The ejection fraction 4 chamber is calculated at 43%.                            Mild left ventricular hypertrophy.                             The estimated ejection fraction is 40-45%.                            The tissue Doppler is indeterminate.                            Severely dilated left atrium.                            The right ventricular chamber size and systolic function are within normal limits.                            There is severe right atrial enlargement.                            The aortic valve appears mildly calcified. There is no evidence of aortic regurgitation.                            The mitral valve appears mildly thickened. There is mild to moderate mitral regurgitation.                            Right ventricular systolic pressure of 30 mmHg.                            Tricuspid valve is structurally normal.                            Moderate to severe tricuspid regurgitation.                            The aortic root appears normal.                            There is no dilatation of the ascending aorta.                            No evidence of pericardial effusion.           Stress test 5/14/19  Summary    Normal pharmacological stress SPECT Myocardial Perfusion Imaging.    No evidence of stress induced myocardial ischemia or infarction.      Summary of LV Function    Normal LV systolic function.    Post stress ejection fraction of 68%         Past Medical History:   Diagnosis Date    CAD (coronary artery disease)     History of stroke     Hyperlipidemia     Hypertension     Persistent atrial fibrillation     Type 2 diabetes mellitus        History reviewed. No pertinent surgical history.    Prior to Admission medications    Medication Sig Start Date End Date Taking? Authorizing Provider   apixaban (ELIQUIS) 5 MG tablet tablet Take 1 tablet by mouth 2 (Two) Times a Day.   Yes Hermelindo Garzon MD   aspirin 81 MG EC tablet Take 1 tablet by mouth Daily.   Yes Hermelindo Garzon MD   atorvastatin (LIPITOR) 40 MG tablet Take 1 tablet by mouth Daily.   Yes Hermelindo Garzon MD    Cholecalciferol 25 MCG (1000 UT) tablet Take 2 tablets by mouth Daily.   Yes Hermelindo Garzon MD   dilTIAZem CD (CARDIZEM CD) 240 MG 24 hr capsule Take 1 capsule by mouth 2 (Two) Times a Day.   Yes Hermelindo Garzon MD   divalproex (DEPAKOTE) 125 MG DR tablet Take 1 tablet by mouth.   Yes Hermelindo Garzon MD   doxycycline (MONODOX) 100 MG capsule Take 1 capsule by mouth Every 12 (Twelve) Hours for 5 days. Indications: Infection of the Skin and/or Soft Tissue 6/20/24 6/25/24 Yes Damon Mohan MD   fluorometholone (EFLONE) 0.1 % ophthalmic suspension Administer 1 drop to both eyes 4 (Four) Times a Day. PRN   Yes Hermelindo Garzon MD   gabapentin (NEURONTIN) 100 MG capsule Take 1 capsule by mouth 3 (Three) Times a Day.   Yes Hermelindo Garzon MD   hydrOXYzine (ATARAX) 25 MG tablet Take 1 tablet by mouth 3 (Three) Times a Day As Needed for Itching.   Yes ProviderHermelindo MD   lisinopril (PRINIVIL,ZESTRIL) 5 MG tablet Take 1 tablet by mouth Daily.   Yes ProviderHermelindo MD   metFORMIN (GLUCOPHAGE) 500 MG tablet Take 1 tablet by mouth 2 (Two) Times a Day With Meals.   Yes ProviderHermelindo MD   metoprolol succinate XL (TOPROL-XL) 50 MG 24 hr tablet Take 1 tablet by mouth Daily.   Yes ProviderHermelindo MD   potassium chloride (K-DUR,KLOR-CON) 20 MEQ tablet controlled-release ER tablet Take 1 tablet by mouth Daily.   Yes Hermelindo Garzon MD   sertraline (ZOLOFT) 25 MG tablet Take 1 tablet by mouth Daily.   Yes ProviderHermelindo MD   torsemide (DEMADEX) 20 MG tablet Take 1 tablet by mouth Daily.   Yes ProviderHermelindo MD   vitamin C (ASCORBIC ACID) 250 MG tablet Take 2 tablets by mouth Daily.   Yes Hermelindo Garzon MD   amoxicillin-clavulanate (AUGMENTIN) 875-125 MG per tablet Take 1 tablet by mouth 2 (Two) Times a Day for 5 days. 6/20/24 6/25/24  Damon Mohan MD       Social History     Socioeconomic History    Marital status: Single   Tobacco Use  "   Smoking status: Never     Passive exposure: Never    Smokeless tobacco: Never   Vaping Use    Vaping status: Never Used   Substance and Sexual Activity    Drug use: Never    Sexual activity: Not Currently       History reviewed. No pertinent family history.    Review of Systems   All other systems reviewed and are negative.       Objective:     Vitals:    06/20/24 0030 06/20/24 0400 06/20/24 0726 06/20/24 1313   BP: 133/78  110/99 96/72   BP Location: Right arm  Right arm Right arm   Patient Position: Lying  Sitting Lying   Pulse: (!) 134 115 (!) 132 81   Resp: 18  18 18   Temp: 97.7 °F (36.5 °C)  97.8 °F (36.6 °C) 97.9 °F (36.6 °C)   TempSrc: Oral  Oral Oral   SpO2: 97%  99% 100%   Weight:       Height:         Body mass index is 26.92 kg/m².  Flowsheet Rows      Flowsheet Row First Filed Value   Admission Height 165.1 cm (65\") Documented at 06/17/2024 1453   Admission Weight 86.2 kg (190 lb) Documented at 06/17/2024 1453            Physical Exam  Vitals reviewed.   Constitutional:       Appearance: She is well-developed.   HENT:      Head: Normocephalic.      Nose: Nose normal.   Eyes:      Conjunctiva/sclera: Conjunctivae normal.   Neck:      Vascular: No JVD.   Cardiovascular:      Rate and Rhythm: Normal rate. Rhythm irregular.      Heart sounds: Murmur heard.       with a grade of 2/6.   Pulmonary:      Effort: Pulmonary effort is normal.      Breath sounds: Normal breath sounds.   Abdominal:      Palpations: Abdomen is soft.      Tenderness: There is no abdominal tenderness.   Musculoskeletal:         General: Normal range of motion.      Cervical back: Normal range of motion.      Comments: LLE bandaged     Skin:     General: Skin is warm and dry.   Neurological:      Mental Status: She is alert.         Lab Review:                Results from last 7 days   Lab Units 06/20/24  0335   SODIUM mmol/L 139   POTASSIUM mmol/L 3.4*   CHLORIDE mmol/L 108*   CO2 mmol/L 21.4*   BUN mg/dL 19   CREATININE mg/dL 1.03* "   GLUCOSE mg/dL 139*   CALCIUM mg/dL 7.9*         Results from last 7 days   Lab Units 06/20/24  0335   WBC 10*3/mm3 6.17   HEMOGLOBIN g/dL 11.9*   HEMATOCRIT % 35.2   PLATELETS 10*3/mm3 111*                                           I personally viewed and interpreted the patient's EKG/Telemetry data    Assessment/Plan:     1. Chronic atrial fibrillation  2. Leg cellulitis/cat scratch  3. Sepsis, resolved  4. HTN    Her home antihypertensive/rate control medications had to be held or resumed at lower doses due to low blood pressure associated with infection.  This morning, her heart rate was noted to be fast.  She is asymptomatic.  Her diltiazem dose has been resumed at its normal level and she will receive her evening dose tonight.  Her home metoprolol succinate has been received and she will get that today.  I am hopeful that her heart rate will be better in the morning and can go home on her usual regimen.  She is on her home dose of apixaban.

## 2024-06-21 LAB
ANION GAP SERPL CALCULATED.3IONS-SCNC: 6 MMOL/L (ref 5–15)
BASOPHILS # BLD MANUAL: 0 10*3/MM3 (ref 0–0.2)
BASOPHILS NFR BLD MANUAL: 0 % (ref 0–1.5)
BUN SERPL-MCNC: 15 MG/DL (ref 8–23)
BUN/CREAT SERPL: 15 (ref 7–25)
BURR CELLS BLD QL SMEAR: NORMAL
CALCIUM SPEC-SCNC: 7.8 MG/DL (ref 8.6–10.5)
CHLORIDE SERPL-SCNC: 113 MMOL/L (ref 98–107)
CO2 SERPL-SCNC: 24 MMOL/L (ref 22–29)
CREAT SERPL-MCNC: 1 MG/DL (ref 0.57–1)
DEPRECATED RDW RBC AUTO: 50.4 FL (ref 37–54)
EGFRCR SERPLBLD CKD-EPI 2021: 56.4 ML/MIN/1.73
EOSINOPHIL # BLD MANUAL: 0.06 10*3/MM3 (ref 0–0.4)
EOSINOPHIL NFR BLD MANUAL: 1 % (ref 0.3–6.2)
ERYTHROCYTE [DISTWIDTH] IN BLOOD BY AUTOMATED COUNT: 13 % (ref 12.3–15.4)
GLUCOSE SERPL-MCNC: 140 MG/DL (ref 65–99)
HCT VFR BLD AUTO: 34.7 % (ref 34–46.6)
HGB BLD-MCNC: 11.7 G/DL (ref 12–15.9)
LYMPHOCYTES # BLD MANUAL: 2.06 10*3/MM3 (ref 0.7–3.1)
LYMPHOCYTES NFR BLD MANUAL: 5 % (ref 5–12)
MCH RBC QN AUTO: 35.5 PG (ref 26.6–33)
MCHC RBC AUTO-ENTMCNC: 33.7 G/DL (ref 31.5–35.7)
MCV RBC AUTO: 105.2 FL (ref 79–97)
MONOCYTES # BLD: 0.32 10*3/MM3 (ref 0.1–0.9)
NEUTROPHILS # BLD AUTO: 3.99 10*3/MM3 (ref 1.7–7)
NEUTROPHILS NFR BLD MANUAL: 62 % (ref 42.7–76)
PLATELET # BLD AUTO: 113 10*3/MM3 (ref 140–450)
PMV BLD AUTO: 10.7 FL (ref 6–12)
POIKILOCYTOSIS BLD QL SMEAR: NORMAL
POTASSIUM SERPL-SCNC: 4.2 MMOL/L (ref 3.5–5.2)
RBC # BLD AUTO: 3.3 10*6/MM3 (ref 3.77–5.28)
SMALL PLATELETS BLD QL SMEAR: NORMAL
SODIUM SERPL-SCNC: 143 MMOL/L (ref 136–145)
VARIANT LYMPHS NFR BLD MANUAL: 32 % (ref 19.6–45.3)
WBC MORPH BLD: NORMAL
WBC NRBC COR # BLD AUTO: 6.44 10*3/MM3 (ref 3.4–10.8)

## 2024-06-21 PROCEDURE — 25010000002 DIGOXIN PER 500 MCG: Performed by: NURSE PRACTITIONER

## 2024-06-21 PROCEDURE — 85025 COMPLETE CBC W/AUTO DIFF WBC: CPT | Performed by: INTERNAL MEDICINE

## 2024-06-21 PROCEDURE — 99232 SBSQ HOSP IP/OBS MODERATE 35: CPT | Performed by: NURSE PRACTITIONER

## 2024-06-21 PROCEDURE — 85007 BL SMEAR W/DIFF WBC COUNT: CPT | Performed by: INTERNAL MEDICINE

## 2024-06-21 PROCEDURE — 25810000003 SODIUM CHLORIDE 0.9 % SOLUTION: Performed by: NURSE PRACTITIONER

## 2024-06-21 PROCEDURE — 80048 BASIC METABOLIC PNL TOTAL CA: CPT | Performed by: INTERNAL MEDICINE

## 2024-06-21 PROCEDURE — 97530 THERAPEUTIC ACTIVITIES: CPT

## 2024-06-21 RX ORDER — METOPROLOL SUCCINATE 50 MG/1
50 TABLET, EXTENDED RELEASE ORAL EVERY 12 HOURS SCHEDULED
Status: DISCONTINUED | OUTPATIENT
Start: 2024-06-21 | End: 2024-06-23 | Stop reason: HOSPADM

## 2024-06-21 RX ORDER — DIGOXIN 0.25 MG/ML
250 INJECTION INTRAMUSCULAR; INTRAVENOUS ONCE
Status: COMPLETED | OUTPATIENT
Start: 2024-06-21 | End: 2024-06-21

## 2024-06-21 RX ADMIN — DIVALPROEX SODIUM 125 MG: 125 TABLET, DELAYED RELEASE ORAL at 08:49

## 2024-06-21 RX ADMIN — SODIUM CHLORIDE 100 ML/HR: 9 INJECTION, SOLUTION INTRAVENOUS at 08:57

## 2024-06-21 RX ADMIN — ASPIRIN 81 MG: 81 TABLET, COATED ORAL at 08:50

## 2024-06-21 RX ADMIN — ATORVASTATIN CALCIUM 40 MG: 20 TABLET, FILM COATED ORAL at 08:49

## 2024-06-21 RX ADMIN — DOXYCYCLINE 100 MG: 100 CAPSULE ORAL at 08:50

## 2024-06-21 RX ADMIN — GABAPENTIN 100 MG: 100 CAPSULE ORAL at 20:00

## 2024-06-21 RX ADMIN — METOPROLOL SUCCINATE 50 MG: 50 TABLET, FILM COATED, EXTENDED RELEASE ORAL at 16:35

## 2024-06-21 RX ADMIN — METOPROLOL SUCCINATE 50 MG: 50 TABLET, FILM COATED, EXTENDED RELEASE ORAL at 08:50

## 2024-06-21 RX ADMIN — DIGOXIN 250 MCG: 0.25 INJECTION INTRAMUSCULAR; INTRAVENOUS at 16:35

## 2024-06-21 RX ADMIN — DILTIAZEM HYDROCHLORIDE 240 MG: 240 CAPSULE, COATED, EXTENDED RELEASE ORAL at 08:49

## 2024-06-21 RX ADMIN — Medication 10 ML: at 08:50

## 2024-06-21 RX ADMIN — DIVALPROEX SODIUM 125 MG: 125 TABLET, DELAYED RELEASE ORAL at 20:00

## 2024-06-21 RX ADMIN — DOXYCYCLINE 100 MG: 100 CAPSULE ORAL at 20:00

## 2024-06-21 RX ADMIN — AMOXICILLIN AND CLAVULANATE POTASSIUM 1 TABLET: 875; 125 TABLET, FILM COATED ORAL at 20:00

## 2024-06-21 RX ADMIN — GABAPENTIN 100 MG: 100 CAPSULE ORAL at 16:35

## 2024-06-21 RX ADMIN — Medication 10 ML: at 20:01

## 2024-06-21 RX ADMIN — AMOXICILLIN AND CLAVULANATE POTASSIUM 1 TABLET: 875; 125 TABLET, FILM COATED ORAL at 08:50

## 2024-06-21 RX ADMIN — DILTIAZEM HYDROCHLORIDE 240 MG: 240 CAPSULE, COATED, EXTENDED RELEASE ORAL at 20:00

## 2024-06-21 RX ADMIN — APIXABAN 5 MG: 5 TABLET, FILM COATED ORAL at 20:00

## 2024-06-21 RX ADMIN — GABAPENTIN 100 MG: 100 CAPSULE ORAL at 08:49

## 2024-06-21 RX ADMIN — APIXABAN 5 MG: 5 TABLET, FILM COATED ORAL at 08:49

## 2024-06-21 NOTE — NURSING NOTE
06/21/24 0825   Wound 06/18/24 0112 Left lower leg   Placement Date/Time: 06/18/24 0112   Side: Left  Orientation: lower  Location: leg   Wound Image    Base clean  (4 puncture sites on LLE)   Periwound intact;ecchymotic;swelling   Periwound Temperature warm   Periwound Skin Turgor soft   Drainage Characteristics/Odor serosanguineous   Drainage Amount scant   Care, Wound cleansed with;soap and water   Dressing Care dressing changed  (Optifoam AG applied over wounds, secured with roll guaze adn 2 ACE wraps toes to knee.)     CWON note: pt seen for for follow up evaluation of LLE. Bleeding has been controlled and the leg is much improved today. I covered with wounds with Optifoam AG and secured with light compression. This can be changed in 3 days, pt may remove at home if discharged over the weekend, and I gave her an extra piece of the Optifoam ag to reapply if the wounds remain open when the dressing is removed. She verbalized understanding of instructions. Orders updated in EPIC.

## 2024-06-21 NOTE — PROGRESS NOTES
Name: Madelene Naegele ADMIT: 2024   : 1940  PCP: Ava Sebastian MD    MRN: [id number] LOS: 4 days   AGE/SEX: 83 y.o. female  ROOM: [email or web address]     Subjective   Subjective   Patient is lying in bed and does not appear to have any major distress.  Denies nausea, vomiting, abdominal pain, chest pain.  Continues to have episodes of RVR.       Objective   Objective   Vital Signs  Temp:  [97 °F (36.1 °C)-98.8 °F (37.1 °C)] 97.7 °F (36.5 °C)  Heart Rate:  [] 133  Resp:  [17-18] 18  BP: ()/(72-97) 120/85  SpO2:  [96 %-100 %] 96 %  on   ;   Device (Oxygen Therapy): room air  Body mass index is 26.92 kg/m².  Physical Exam  HEENT: PERRLA, extraocular movements intact, Scleras no icterus  Neck: Supple, no JVD  Cardiovascular: Irregular/tachycardic with normal S1 and S2.  Respiratory: Fairly clear to auscultation bilaterally with no wheezes  GI: Soft, nontender, bowel sounds present  Extremities: No edema, palpable pulses, left leg redness, erythema, edema has been improving  Neurologic: Grossly nonfocal, no facial asymmetry    Results Review     I reviewed the patient's new clinical results.  Results from last 7 days   Lab Units 24  0355   WBC 10*3/mm3 6.44 6.17 6.60 11.92*   HEMOGLOBIN g/dL 11.7* 11.9* 11.8* 11.8*   PLATELETS 10*3/mm3 113* 111* 118* 109*     Results from last 7 days   Lab Units 24  0316 24  0355   SODIUM mmol/L 143 139 141 139   POTASSIUM mmol/L 4.2 3.4* 3.9 3.9   CHLORIDE mmol/L 113* 108* 105 102   CO2 mmol/L 24.0 21.4* 25.9 27.0   BUN mg/dL 15 19 30* 48*   CREATININE mg/dL 1.00 1.03* 1.39* 2.09*   GLUCOSE mg/dL 140* 139* 126* 129*   EGFR mL/min/1.73 56.4* 54.4* 38.0* 23.3*     Results from last 7 days   Lab Units 24  1518   ALBUMIN g/dL 3.7   BILIRUBIN mg/dL 1.1   ALK PHOS U/L 46   AST (SGOT) U/L 31   ALT (SGPT) U/L 12     Results from last 7 days   Lab Units 24  7083  06/20/24  0335 06/19/24  0316 06/18/24  0355 06/17/24  1518   CALCIUM mg/dL 7.8* 7.9* 8.4* 7.7* 9.4   ALBUMIN g/dL  --   --   --   --  3.7     Results from last 7 days   Lab Units 06/17/24  2304 06/17/24  1905 06/17/24  1518   LACTATE mmol/L 1.4 2.1* 3.5*     Glucose   Date/Time Value Ref Range Status   06/20/2024 1626 175 (H) 70 - 130 mg/dL Final   06/20/2024 1119 206 (H) 70 - 130 mg/dL Final       No radiology results for the last day    I have personally reviewed all medications:  Scheduled Medications  amoxicillin-clavulanate, 1 tablet, Oral, Q12H  apixaban, 5 mg, Oral, BID  aspirin, 81 mg, Oral, Daily  atorvastatin, 40 mg, Oral, Daily  dilTIAZem CD, 240 mg, Oral, BID  divalproex, 125 mg, Oral, BID  doxycycline, 100 mg, Oral, Q12H  fluorometholone, 1 drop, Both Eyes, 4x Daily  gabapentin, 100 mg, Oral, TID  metoprolol succinate XL, 50 mg, Oral, Q24H  sertraline, 25 mg, Oral, Daily  sodium chloride, 10 mL, Intravenous, Q12H    Infusions     Diet  Diet: Regular/House; Fluid Consistency: Thin (IDDSI 0)    I have personally reviewed:  [x]  Laboratory   [x]  Microbiology   [x]  Radiology   [x]  EKG/Telemetry  [x]  Cardiology/Vascular   []  Pathology    []  Records       Assessment/Plan     Active Hospital Problems    Diagnosis  POA    **Cellulitis of left leg [L03.116]  Yes    HTN (hypertension) [I10]  Unknown    Atrial fibrillation, persistent [I48.19]  Unknown    Hyperlipidemia [E78.5]  Unknown    History of stroke [Z86.73]  Not Applicable    CAD (coronary artery disease) [I25.10]  Unknown    Type 2 diabetes mellitus, without long-term current use of insulin [E11.9]  Unknown    Severe sepsis [A41.9, R65.20]  Unknown      Resolved Hospital Problems   No resolved problems to display.       83 y.o. female admitted with Cellulitis of left leg.    1. Left leg cellulitis/cat scratch, was on IV Zosyn and is being transitioned to Augmentin and will continue with doxycycline.  WBC is normal and patient is afebrile and left  leg cellulitis is improving quite well.  2.  History of atrial fibrillation, on aspirin, Eliquis, Cardizem and metoprolol which will continued.  Continues to have episodes of RVR and cardiology is following along.  3.  Neuropathy, Neurontin.  4.  Hyperlipidemia, on statins.  5.  History of coronary artery disease, continue with aspirin, metoprolol and statins.  6.  DVT prophylaxis, on Eliquis.  7.  CODE STATUS is full code.    Copied text on this note has been reviewed by me on 6/20/2024    Estimated discharge date, discharge once heart rate is better controlled.      Damon Mohan MD  [facility]  06/21/24  12:02 EDT

## 2024-06-21 NOTE — PLAN OF CARE
Goal Outcome Evaluation:   Patient rested well throughout the night. No complaints of pain.

## 2024-06-21 NOTE — THERAPY TREATMENT NOTE
Patient Name: Madelene Naegele  : 1940    MRN: [id number]                              Today's Date: 2024       Admit Date: 2024    Visit Dx:     ICD-10-CM ICD-9-CM   1. Septic shock  A41.9 038.9    R65.21 785.52     995.92   2. Cellulitis of left lower extremity  L03.116 682.6     Patient Active Problem List   Diagnosis    Cellulitis of left leg    Severe sepsis    HTN (hypertension)    Atrial fibrillation, persistent    Hyperlipidemia    History of stroke    CAD (coronary artery disease)    Type 2 diabetes mellitus, without long-term current use of insulin     Past Medical History:   Diagnosis Date    CAD (coronary artery disease)     History of stroke     Hyperlipidemia     Hypertension     Persistent atrial fibrillation     Type 2 diabetes mellitus      History reviewed. No pertinent surgical history.   General Information       Row Name 24          Physical Therapy Time and Intention    Document Type therapy note (daily note)  -     Mode of Treatment individual therapy;physical therapy  -       Row Name 24          General Information    Patient Profile Reviewed yes  -     Existing Precautions/Restrictions fall  some aphasia noted, has hx of CVA per RN  -       Row Name 24          Living Environment    People in Home alone  M-F caregiver assist per pt  -       Row Name 24          Cognition    Orientation Status (Cognition) oriented x 3  needs extra time to get answer out  -       Row Name 24          Safety Issues, Functional Mobility    Impairments Affecting Function (Mobility) endurance/activity tolerance;strength;postural/trunk control  -               User Key  (r) = Recorded By, (t) = Taken By, (c) = Cosigned By      Initials Name Provider Type    Kori Begum PTA Physical Therapist Assistant                   Mobility       Row Name 24          Bed Mobility    Comment, (Bed Mobility) in chair  -JOANNA        [facility] Name 06/21/24 1814          Sit-Stand Transfer    Sit-Stand [place] (Transfers) minimum assist (75% patient effort);verbal cues;nonverbal cues (demo/gesture)  -     Assistive Device (Sit-Stand Transfers) walker, front-wheeled  -[facility] Name 06/21/24 1814          Gait/Stairs (Locomotion)    [place] Level (Gait) contact guard;verbal cues  -     Assistive Device (Gait) walker, front-wheeled  -     Distance in Feet (Gait) 100  -     Deviations/Abnormal Patterns (Gait) kelsey decreased;festinating/shuffling;stride length decreased  -     Bilateral Gait Deviations forward flexed posture  -               User Key  (r) = Recorded By, (t) = Taken By, (c) = Cosigned By      Initials Name Provider Type    Kori Begum PTA Physical Therapist Assistant                   Obj/Interventions    No documentation.                  Goals/Plan    No documentation.                  Clinical Impression       Row Name 06/21/24 1815          Pain    Pretreatment Pain Rating 0/10 - no pain  -     Posttreatment Pain Rating 0/10 - no pain  -JM       Row Name 06/21/24 1815          Plan of Care Review    Plan of Care Reviewed With patient  -     Progress improving  -     Outcome Evaluation Pt agreed to PT session, resting in chair upon arrival, required CGA/MIN 1 for STS, CGA for amb ~100ft, cues for posture, but may be forw flexed at baseline, plans home at [place] if her friends can help her, caregivers only work M-F per pt, has walker at home  -[facility] Name 06/21/24 1815          Therapy Assessment/Plan (PT)    Rehab Potential (PT) good, to achieve stated therapy goals  -JM       Row Name 06/21/24 1815          Vital Signs    O2 Delivery Pre Treatment room air  -JM       Row Name 06/21/24 1815          Positioning and Restraints    Pre-Treatment Position sitting in chair/recliner  -     Post Treatment Position chair  -     In Chair reclined;call light within reach;encouraged to call for  assist;exit alarm on;notified [facility]  -               User Key  (r) = Recorded By, (t) = Taken By, (c) = Cosigned By      Initials Name Provider Type    Kori Begum PTA Physical Therapist Assistant                   Outcome Measures       Row Name 06/21/24 1818 06/21/24 1415       How much help from another person do you currently need...    Turning from your back to your side while in flat bed without using bedrails? 3  - 4  -AC    Moving from lying on back to sitting on the side of a flat bed without bedrails? 3  - 4  -AC    Moving to and from a bed to a chair (including a wheelchair)? 3  - 3  -AC    Standing up from a chair using your arms (e.g., wheelchair, bedside chair)? 3  - 3  -AC    Climbing 3-5 steps with a railing? 2  - 3  -AC    To walk in hospital room? 3  - 3  -AC    AM-PAC 6 Clicks Score (PT) 17  - 20  -AC    Highest Level of Mobility Goal 5 --> Static standing  - 6 --> Walk 10 steps or more  -              User Key  (r) = Recorded By, (t) = Taken By, (c) = Cosigned By      Initials Name Provider Type    Kori Begum PTA Physical Therapist Assistant    AC Merly Oliver, RN Registered Nurse                                 Physical Therapy Education       Title: PT OT SLP Therapies (Done)       Topic: Physical Therapy (Done)       Point: Mobility training (Done)       Learning Progress Summary             Patient Acceptance, E,TB,D, VU,NR by JOANNA at 6/21/2024 1819    Acceptance, E,D, VU,NR by MS at 6/19/2024 1144                         Point: Home exercise program (Done)       Learning Progress Summary             Patient Acceptance, E,TB,D, VU,NR by JOANNA at 6/21/2024 1819                         Point: Body mechanics (Done)       Learning Progress Summary             Patient Acceptance, E,TB,D, VU,NR by JOANNA at 6/21/2024 1819    Acceptance, E,D, VU,NR by MS at 6/19/2024 1144                         Point: Precautions (Done)       Learning Progress Summary              Patient Acceptance, E,TB,D, VU,NR by  at 6/21/2024 1819    Acceptance, E,D, VU,NR by MS at 6/19/2024 1144                                         User Key       Initials Effective Dates Name Provider Type Discipline     03/07/18 -  Kori Cabrera PTA Physical Therapist Assistant PT    MS 06/16/21 -  Javi Greer PT Physical Therapist PT                  PT Recommendation and Plan     Plan of Care Reviewed With: patient  Progress: improving  Outcome Evaluation: Pt agreed to PT session, resting in chair upon arrival, required CGA/MIN 1 for STS, CGA for amb ~100ft, cues for posture, but may be forw flexed at baseline, plans home at [place] if her friends can help her, caregivers only work M-F per pt, has walker at home     Time Calculation:         PT Charges       Row Name 06/21/24 1819             Time Calculation    Start Time 1603  -      Stop Time 1614  -      Time Calculation (min) 11 min  -      PT Received On 06/21/24  -JOANNA      PT - Next Appointment 06/22/24  -JOANNA                User Key  (r) = Recorded By, (t) = Taken By, (c) = Cosigned By      Initials Name Provider Type    Koir Begum PTA Physical Therapist Assistant                  Therapy Charges for Today       Code Description Service Date Service Provider Modifiers Qty    11241432399 HC PT THERAPEUTIC ACT EA 15 MIN 6/21/2024 Kori Cabrera PTA GP 1            PT G-Codes  Outcome Measure Options: AM-PAC 6 Clicks Basic Mobility (PT)  AM-PAC 6 Clicks Score (PT): 17  PT Discharge Summary  Anticipated Discharge Disposition (PT): home with assist, home with home health    Kori Cabrera PTA  6/21/2024

## 2024-06-21 NOTE — PROGRESS NOTES
"[facility]    Patient Name: Madelene Naegele  :1940  83 y.o.  LOS: 4  Encounter Provider: YOVANY Dietrich      Patient Care Team:  Ava Sebastian MD as PCP - General (Internal Medicine)    Chief Complaint:  Cellulitis; following for Atrial fibrillation with RVR    Interval History:  Still with elevated HR. She is completely asymptomatic with this.     Objective   Vital Signs  Temp:  [97 °F (36.1 °C)-98.8 °F (37.1 °C)] 97.7 °F (36.5 °C)  Heart Rate:  [] 133  Resp:  [17-18] 18  BP: ()/(72-97) 120/85    Intake/Output Summary (Last 24 hours) at 2024 0924  Last data filed at 2024 2206  Gross per 24 hour   Intake 240 ml   Output --   Net 240 ml     Flowsheet Rows      Flowsheet Row First Filed Value   Admission Height 165.1 cm (65\") Documented at 2024 1453   Admission Weight 86.2 kg (190 lb) Documented at 2024 1453              Vitals reviewed.   Constitutional:       General: Not in acute distress.     Appearance: Well-developed. Not diaphoretic.   HENT:      Head: Normocephalic.   Pulmonary:      Effort: Pulmonary effort is normal. No respiratory distress.      Breath sounds: Normal breath sounds. No wheezing. No rhonchi. No rales.   Cardiovascular:      Normal rate. Irregularly irregular rhythm.   Pulses:     Radial: 2+ bilaterally.  Edema:     Peripheral edema absent.   Skin:     General: Skin is warm and dry. There is no cyanosis.      Findings: No rash.   Neurological:      Mental Status: Alert and oriented to person, place, and time.   Psychiatric:         Behavior: Behavior normal. Behavior is cooperative.         Thought Content: Thought content normal.         Judgment: Judgment normal.           Pertinent Test Results:  Results from last 7 days   Lab Units 24  0459 24  0335 24  0316 24  0355 24  1518   SODIUM mmol/L 143 139 141 139 133*   POTASSIUM mmol/L 4.2 3.4* 3.9 3.9 4.5   CHLORIDE mmol/L 113* 108* " "105 102 92*   CO2 mmol/L 24.0 21.4* 25.9 27.0 28.2   BUN mg/dL 15 19 30* 48* 55*   CREATININE mg/dL 1.00 1.03* 1.39* 2.09* 2.74*   GLUCOSE mg/dL 140* 139* 126* 129* 160*   CALCIUM mg/dL 7.8* 7.9* 8.4* 7.7* 9.4   AST (SGOT) U/L  --   --   --   --  31   ALT (SGPT) U/L  --   --   --   --  12         Results from last 7 days   Lab Units 06/21/24  0459 06/20/24  0335 06/19/24  0316 06/18/24  0355 06/17/24  1518   WBC 10*3/mm3 6.44 6.17 6.60 11.92* 17.82*   HEMOGLOBIN g/dL 11.7* 11.9* 11.8* 11.8* 14.1   HEMATOCRIT % 34.7 35.2 35.8 35.0 41.5   PLATELETS 10*3/mm3 113* 111* 118* 109* 132*                   Invalid input(s): \"LDLCALC\"                Medication Review:   amoxicillin-clavulanate, 1 tablet, Oral, Q12H  apixaban, 5 mg, Oral, BID  aspirin, 81 mg, Oral, Daily  atorvastatin, 40 mg, Oral, Daily  dilTIAZem CD, 240 mg, Oral, BID  divalproex, 125 mg, Oral, BID  doxycycline, 100 mg, Oral, Q12H  fluorometholone, 1 drop, Both Eyes, 4x Daily  gabapentin, 100 mg, Oral, TID  metoprolol succinate XL, 50 mg, Oral, Q24H  sertraline, 25 mg, Oral, Daily  sodium chloride, 10 mL, Intravenous, Q12H         sodium chloride, 100 mL/hr, Last Rate: 100 mL/hr (06/21/24 0857)        Assessment & Plan     Active Hospital Problems    Diagnosis  POA    **Cellulitis of left leg [L03.116]  Yes    HTN (hypertension) [I10]  Unknown    Atrial fibrillation, persistent [I48.19]  Unknown    Hyperlipidemia [E78.5]  Unknown    History of stroke [Z86.73]  Not Applicable    CAD (coronary artery disease) [I25.10]  Unknown    Type 2 diabetes mellitus, without long-term current use of insulin [E11.9]  Unknown    Severe sepsis [A41.9, R65.20]  Unknown      Resolved Hospital Problems   No resolved problems to display.        1. Chronic atrial fibrillation: Back on home doses of diltiazem and metoprolol. HR improved but still elevated this AM up to 150 bpm. Will increase metoprolol to 50 mg BID and give IV digoxin 250 mcg x1 dose.  2. Leg cellulitis/cat " scratch  3. Sepsis, resolved  4. HTN: stable           YOVANY Dietrich  [facility]   [facility]  [address]  [place] [address]  Office: [phone]    06/21/24  09:24 EDT

## 2024-06-21 NOTE — PLAN OF CARE
Goal Outcome Evaluation:  Plan of Care Reviewed With: patient        Progress: improving  Outcome Evaluation: Pt agreed to PT session, resting in chair upon arrival, required CGA/MIN 1 for STS, CGA for amb ~100ft, cues for posture, but may be forw flexed at baseline, plans home at [place] if her friends can help her, caregivers only work M-F per pt, has walker at home      Anticipated Discharge Disposition (PT): home with assist, home with home health

## 2024-06-22 LAB
ANION GAP SERPL CALCULATED.3IONS-SCNC: 6.2 MMOL/L (ref 5–15)
BACTERIA SPEC AEROBE CULT: NORMAL
BACTERIA SPEC AEROBE CULT: NORMAL
BASOPHILS # BLD AUTO: 0.03 10*3/MM3 (ref 0–0.2)
BASOPHILS NFR BLD AUTO: 0.4 % (ref 0–1.5)
BUN SERPL-MCNC: 14 MG/DL (ref 8–23)
BUN/CREAT SERPL: 15.9 (ref 7–25)
CALCIUM SPEC-SCNC: 8.2 MG/DL (ref 8.6–10.5)
CHLORIDE SERPL-SCNC: 113 MMOL/L (ref 98–107)
CO2 SERPL-SCNC: 22.8 MMOL/L (ref 22–29)
CREAT SERPL-MCNC: 0.88 MG/DL (ref 0.57–1)
DEPRECATED RDW RBC AUTO: 49.1 FL (ref 37–54)
EGFRCR SERPLBLD CKD-EPI 2021: 65.7 ML/MIN/1.73
EOSINOPHIL # BLD AUTO: 0.32 10*3/MM3 (ref 0–0.4)
EOSINOPHIL NFR BLD AUTO: 4.6 % (ref 0.3–6.2)
ERYTHROCYTE [DISTWIDTH] IN BLOOD BY AUTOMATED COUNT: 13 % (ref 12.3–15.4)
GLUCOSE SERPL-MCNC: 139 MG/DL (ref 65–99)
HCT VFR BLD AUTO: 35.9 % (ref 34–46.6)
HGB BLD-MCNC: 12.1 G/DL (ref 12–15.9)
IMM GRANULOCYTES # BLD AUTO: 0.16 10*3/MM3 (ref 0–0.05)
IMM GRANULOCYTES NFR BLD AUTO: 2.3 % (ref 0–0.5)
LYMPHOCYTES # BLD AUTO: 2.12 10*3/MM3 (ref 0.7–3.1)
LYMPHOCYTES NFR BLD AUTO: 30.4 % (ref 19.6–45.3)
MCH RBC QN AUTO: 34.9 PG (ref 26.6–33)
MCHC RBC AUTO-ENTMCNC: 33.7 G/DL (ref 31.5–35.7)
MCV RBC AUTO: 103.5 FL (ref 79–97)
MONOCYTES # BLD AUTO: 0.72 10*3/MM3 (ref 0.1–0.9)
MONOCYTES NFR BLD AUTO: 10.3 % (ref 5–12)
NEUTROPHILS NFR BLD AUTO: 3.63 10*3/MM3 (ref 1.7–7)
NEUTROPHILS NFR BLD AUTO: 52 % (ref 42.7–76)
NRBC BLD AUTO-RTO: 0 /100 WBC (ref 0–0.2)
PLATELET # BLD AUTO: 119 10*3/MM3 (ref 140–450)
PMV BLD AUTO: 10.2 FL (ref 6–12)
POTASSIUM SERPL-SCNC: 4.3 MMOL/L (ref 3.5–5.2)
RBC # BLD AUTO: 3.47 10*6/MM3 (ref 3.77–5.28)
SODIUM SERPL-SCNC: 142 MMOL/L (ref 136–145)
WBC NRBC COR # BLD AUTO: 6.98 10*3/MM3 (ref 3.4–10.8)

## 2024-06-22 PROCEDURE — 80048 BASIC METABOLIC PNL TOTAL CA: CPT | Performed by: INTERNAL MEDICINE

## 2024-06-22 PROCEDURE — 85025 COMPLETE CBC W/AUTO DIFF WBC: CPT | Performed by: INTERNAL MEDICINE

## 2024-06-22 PROCEDURE — 99232 SBSQ HOSP IP/OBS MODERATE 35: CPT | Performed by: NURSE PRACTITIONER

## 2024-06-22 RX ADMIN — ASPIRIN 81 MG: 81 TABLET, COATED ORAL at 08:10

## 2024-06-22 RX ADMIN — DILTIAZEM HYDROCHLORIDE 240 MG: 240 CAPSULE, COATED, EXTENDED RELEASE ORAL at 08:10

## 2024-06-22 RX ADMIN — Medication 10 ML: at 08:00

## 2024-06-22 RX ADMIN — DIVALPROEX SODIUM 125 MG: 125 TABLET, DELAYED RELEASE ORAL at 20:17

## 2024-06-22 RX ADMIN — DILTIAZEM HYDROCHLORIDE 240 MG: 240 CAPSULE, COATED, EXTENDED RELEASE ORAL at 20:17

## 2024-06-22 RX ADMIN — METOPROLOL SUCCINATE 50 MG: 50 TABLET, FILM COATED, EXTENDED RELEASE ORAL at 20:17

## 2024-06-22 RX ADMIN — APIXABAN 5 MG: 5 TABLET, FILM COATED ORAL at 20:17

## 2024-06-22 RX ADMIN — GABAPENTIN 100 MG: 100 CAPSULE ORAL at 20:17

## 2024-06-22 RX ADMIN — ATORVASTATIN CALCIUM 40 MG: 20 TABLET, FILM COATED ORAL at 08:10

## 2024-06-22 RX ADMIN — SERTRALINE 25 MG: 25 TABLET, FILM COATED ORAL at 08:00

## 2024-06-22 RX ADMIN — GABAPENTIN 100 MG: 100 CAPSULE ORAL at 16:09

## 2024-06-22 RX ADMIN — DOXYCYCLINE 100 MG: 100 CAPSULE ORAL at 20:17

## 2024-06-22 RX ADMIN — APIXABAN 5 MG: 5 TABLET, FILM COATED ORAL at 08:10

## 2024-06-22 RX ADMIN — AMOXICILLIN AND CLAVULANATE POTASSIUM 1 TABLET: 875; 125 TABLET, FILM COATED ORAL at 20:17

## 2024-06-22 RX ADMIN — AMOXICILLIN AND CLAVULANATE POTASSIUM 1 TABLET: 875; 125 TABLET, FILM COATED ORAL at 08:10

## 2024-06-22 RX ADMIN — DIVALPROEX SODIUM 125 MG: 125 TABLET, DELAYED RELEASE ORAL at 08:11

## 2024-06-22 RX ADMIN — METOPROLOL SUCCINATE 50 MG: 50 TABLET, FILM COATED, EXTENDED RELEASE ORAL at 08:11

## 2024-06-22 RX ADMIN — GABAPENTIN 100 MG: 100 CAPSULE ORAL at 08:10

## 2024-06-22 RX ADMIN — DOXYCYCLINE 100 MG: 100 CAPSULE ORAL at 08:10

## 2024-06-22 RX ADMIN — Medication 10 ML: at 20:17

## 2024-06-22 NOTE — PLAN OF CARE
Goal Outcome Evaluation:   Patient rested well overnight. HR well controlled overnight. No complaints of pain. Eager to discharge.

## 2024-06-22 NOTE — PROGRESS NOTES
"[facility]    Patient Name: Madelene Naegele  :1940  83 y.o.  LOS: 5  Encounter Provider: YOVANY Dietrich      Patient Care Team:  Ava Sebastian MD as PCP - General (Internal Medicine)    Chief Complaint:  Cellulitis; following for Atrial fibrillation with RVR     Interval History: HR better after digoxin and increased metoprolol yesterday.        Objective   Vital Signs  Temp:  [97.5 °F (36.4 °C)-97.9 °F (36.6 °C)] 97.5 °F (36.4 °C)  Heart Rate:  [84-90] 90  Resp:  [16-18] 16  BP: (120-144)/(79-93) 120/79    Intake/Output Summary (Last 24 hours) at 2024 0805  Last data filed at 2024 0300  Gross per 24 hour   Intake 240 ml   Output 950 ml   Net -710 ml     Flowsheet Rows      Flowsheet Row First Filed Value   Admission Height 165.1 cm (65\") Documented at 2024 1453   Admission Weight 86.2 kg (190 lb) Documented at 2024 1453              Vitals reviewed.   Constitutional:       General: Not in acute distress.     Appearance: Well-developed and not in distress. Not diaphoretic.   HENT:      Head: Normocephalic.   Pulmonary:      Effort: Pulmonary effort is normal. No respiratory distress.      Breath sounds: Normal breath sounds. No wheezing. No rhonchi. No rales.   Cardiovascular:      Normal rate. Irregularly irregular rhythm.   Pulses:     Radial: 2+ bilaterally.  Edema:     Peripheral edema absent.   Skin:     General: Skin is warm and dry. There is no cyanosis.      Findings: No rash.   Neurological:      Mental Status: Alert and oriented to person, place, and time.   Psychiatric:         Behavior: Behavior normal. Behavior is cooperative.         Thought Content: Thought content normal.         Judgment: Judgment normal.           Pertinent Test Results:  Results from last 7 days   Lab Units 24  0522 24  0459 24  0335 24  0316 24  0355 24  1518   SODIUM mmol/L 142 143 139 141 139 133*   POTASSIUM mmol/L 4.3 4.2 " "3.4* 3.9 3.9 4.5   CHLORIDE mmol/L 113* 113* 108* 105 102 92*   CO2 mmol/L 22.8 24.0 21.4* 25.9 27.0 28.2   BUN mg/dL 14 15 19 30* 48* 55*   CREATININE mg/dL 0.88 1.00 1.03* 1.39* 2.09* 2.74*   GLUCOSE mg/dL 139* 140* 139* 126* 129* 160*   CALCIUM mg/dL 8.2* 7.8* 7.9* 8.4* 7.7* 9.4   AST (SGOT) U/L  --   --   --   --   --  31   ALT (SGPT) U/L  --   --   --   --   --  12         Results from last 7 days   Lab Units 06/22/24  0522 06/21/24  0459 06/20/24  0335 06/19/24  0316 06/18/24  0355 06/17/24  1518   WBC 10*3/mm3 6.98 6.44 6.17 6.60 11.92* 17.82*   HEMOGLOBIN g/dL 12.1 11.7* 11.9* 11.8* 11.8* 14.1   HEMATOCRIT % 35.9 34.7 35.2 35.8 35.0 41.5   PLATELETS 10*3/mm3 119* 113* 111* 118* 109* 132*                   Invalid input(s): \"LDLCALC\"                Medication Review:   amoxicillin-clavulanate, 1 tablet, Oral, Q12H  apixaban, 5 mg, Oral, BID  aspirin, 81 mg, Oral, Daily  atorvastatin, 40 mg, Oral, Daily  dilTIAZem CD, 240 mg, Oral, BID  divalproex, 125 mg, Oral, BID  doxycycline, 100 mg, Oral, Q12H  fluorometholone, 1 drop, Both Eyes, 4x Daily  gabapentin, 100 mg, Oral, TID  metoprolol succinate XL, 50 mg, Oral, Q12H  sertraline, 25 mg, Oral, Daily  sodium chloride, 10 mL, Intravenous, Q12H              Assessment & Plan     Active Hospital Problems    Diagnosis  POA    **Cellulitis of left leg [L03.116]  Yes    HTN (hypertension) [I10]  Unknown    Atrial fibrillation, persistent [I48.19]  Unknown    Hyperlipidemia [E78.5]  Unknown    History of stroke [Z86.73]  Not Applicable    CAD (coronary artery disease) [I25.10]  Unknown    Type 2 diabetes mellitus, without long-term current use of insulin [E11.9]  Unknown    Severe sepsis [A41.9, R65.20]  Unknown      Resolved Hospital Problems   No resolved problems to display.        1. Chronic atrial fibrillation: HR much better today on increased Metoprolol and dose of digoxin yesterday. Anticoagulation on Eliquis.  2. Leg cellulitis/cat scratch  3. Sepsis, " resolved  4. HTN: stable    Cardiology will sign off. No objections to discharge from cardiac standpoint. She needs to follow up with her primary cardiologist in 2 weeks after discharge.         YOVANY Dietrich  [facility]   [facility]  [address] [place] [address] [address]  [place] [address]  Office: [phone]    06/22/24  08:05 EDT

## 2024-06-22 NOTE — PROGRESS NOTES
Name: Madelene Naegele ADMIT: 2024   : 1940  PCP: Ava Sebastian MD    MRN: [id number] LOS: 5 days   AGE/SEX: 83 y.o. female  ROOM: [facility]     Subjective   Subjective   Patient is lying in bed and does not appear to have any major distress.  Denies nausea, vomiting, abdominal pain, chest pain.  Heart rate is much better controlled today.       Objective   Objective   Vital Signs  Temp:  [97.1 °F (36.2 °C)-97.9 °F (36.6 °C)] 97.1 °F (36.2 °C)  Heart Rate:  [84-90] 90  Resp:  [16] 16  BP: (116-144)/(64-93) 116/64  SpO2:  [97 %-99 %] 97 %  on   ;   Device (Oxygen Therapy): room air  Body mass index is 26.92 kg/m².  Physical Exam  HEENT: PERRLA, extraocular movements intact, Scleras no icterus  Neck: Supple, no JVD  Cardiovascular: Irregular/tachycardic with normal S1 and S2.  Respiratory: Fairly clear to auscultation bilaterally with no wheezes  GI: Soft, nontender, bowel sounds present  Extremities: No edema, palpable pulses, left leg redness, erythema, edema has been improving  Neurologic: Grossly nonfocal, no facial asymmetry    Results Review     I reviewed the patient's new clinical results.  Results from last 7 days   Lab Units 24  0316   WBC 10*3/mm3 6.98 6.44 6.17 6.60   HEMOGLOBIN g/dL 12.1 11.7* 11.9* 11.8*   PLATELETS 10*3/mm3 119* 113* 111* 118*     Results from last 7 days   Lab Units 24  0524  04524  0335 24  0316   SODIUM mmol/L 142 143 139 141   POTASSIUM mmol/L 4.3 4.2 3.4* 3.9   CHLORIDE mmol/L 113* 113* 108* 105   CO2 mmol/L 22.8 24.0 21.4* 25.9   BUN mg/dL 14 15 19 30*   CREATININE mg/dL 0.88 1.00 1.03* 1.39*   GLUCOSE mg/dL 139* 140* 139* 126*   EGFR mL/min/1.73 65.7 56.4* 54.4* 38.0*     Results from last 7 days   Lab Units 24  1518   ALBUMIN g/dL 3.7   BILIRUBIN mg/dL 1.1   ALK PHOS U/L 46   AST (SGOT) U/L 31   ALT (SGPT) U/L 12     Results from last 7 days   Lab Units 24  0522  06/21/24  0459 06/20/24  0335 06/19/24  0316 06/18/24  0355 06/17/24  1518   CALCIUM mg/dL 8.2* 7.8* 7.9* 8.4*   < > 9.4   ALBUMIN g/dL  --   --   --   --   --  3.7    < > = values in this interval not displayed.     Results from last 7 days   Lab Units 06/17/24  2304 06/17/24  1905 06/17/24  1518   LACTATE mmol/L 1.4 2.1* 3.5*     Glucose   Date/Time Value Ref Range Status   06/20/2024 1626 175 (H) 70 - 130 mg/dL Final   06/20/2024 1119 206 (H) 70 - 130 mg/dL Final       No radiology results for the last day    I have personally reviewed all medications:  Scheduled Medications  amoxicillin-clavulanate, 1 tablet, Oral, Q12H  apixaban, 5 mg, Oral, BID  aspirin, 81 mg, Oral, Daily  atorvastatin, 40 mg, Oral, Daily  dilTIAZem CD, 240 mg, Oral, BID  divalproex, 125 mg, Oral, BID  doxycycline, 100 mg, Oral, Q12H  fluorometholone, 1 drop, Both Eyes, 4x Daily  gabapentin, 100 mg, Oral, TID  metoprolol succinate XL, 50 mg, Oral, Q12H  sertraline, 25 mg, Oral, Daily  sodium chloride, 10 mL, Intravenous, Q12H    Infusions     Diet  Diet: Regular/House; Fluid Consistency: Thin (IDDSI 0)    I have personally reviewed:  [x]  Laboratory   [x]  Microbiology   [x]  Radiology   [x]  EKG/Telemetry  [x]  Cardiology/Vascular   []  Pathology    []  Records       Assessment/Plan     Active Hospital Problems    Diagnosis  POA    **Cellulitis of left leg [L03.116]  Yes    HTN (hypertension) [I10]  Unknown    Atrial fibrillation, persistent [I48.19]  Unknown    Hyperlipidemia [E78.5]  Unknown    History of stroke [Z86.73]  Not Applicable    CAD (coronary artery disease) [I25.10]  Unknown    Type 2 diabetes mellitus, without long-term current use of insulin [E11.9]  Unknown    Severe sepsis [A41.9, R65.20]  Unknown      Resolved Hospital Problems   No resolved problems to display.       83 y.o. female admitted with Cellulitis of left leg.    1. Left leg cellulitis/cat scratch, was on IV Zosyn and is being transitioned to Augmentin and will  continue with doxycycline.  WBC is normal and patient is afebrile and left leg cellulitis is improving quite well.  2.  History of atrial fibrillation, on aspirin, Eliquis, Cardizem and metoprolol which will continued.  Given episodes of RVR cardiology consult was obtained and home dose Cardizem was uptitrated and metoprolol was initiated and now heart rate is better controlled.  3.  Neuropathy, Neurontin.  4.  Hyperlipidemia, on statins.  5.  History of coronary artery disease, continue with aspirin, metoprolol and statins.  6.  DVT prophylaxis, on Eliquis.  7.  CODE STATUS is full code.    Copied text on this note has been reviewed by me on 6/22/2024    Estimated discharge date, 6/23/2024.      Damon Mohan MD  [facility]  06/22/24  16:12 EDT

## 2024-06-23 ENCOUNTER — HOME HEALTH ADMISSION (OUTPATIENT)
Dept: HOME HEALTH SERVICES | Facility: HOME HEALTHCARE | Age: 84
End: 2024-06-23
Payer: MEDICARE

## 2024-06-23 ENCOUNTER — READMISSION MANAGEMENT (OUTPATIENT)
Dept: CALL CENTER | Facility: HOSPITAL | Age: 84
End: 2024-06-23
Payer: MEDICARE

## 2024-06-23 ENCOUNTER — DOCUMENTATION (OUTPATIENT)
Dept: HOME HEALTH SERVICES | Facility: HOME HEALTHCARE | Age: 84
End: 2024-06-23
Payer: MEDICARE

## 2024-06-23 ENCOUNTER — TRANSCRIBE ORDERS (OUTPATIENT)
Dept: HOME HEALTH SERVICES | Facility: HOME HEALTHCARE | Age: 84
End: 2024-06-23
Payer: MEDICARE

## 2024-06-23 VITALS
SYSTOLIC BLOOD PRESSURE: 131 MMHG | TEMPERATURE: 98.8 F | WEIGHT: 161.8 LBS | RESPIRATION RATE: 16 BRPM | DIASTOLIC BLOOD PRESSURE: 89 MMHG | BODY MASS INDEX: 26.96 KG/M2 | OXYGEN SATURATION: 97 % | HEART RATE: 98 BPM | HEIGHT: 65 IN

## 2024-06-23 DIAGNOSIS — L03.116 CELLULITIS OF LEFT LOWER EXTREMITY: Primary | ICD-10-CM

## 2024-06-23 DIAGNOSIS — I48.20 CHRONIC ATRIAL FIBRILLATION: ICD-10-CM

## 2024-06-23 LAB
ANION GAP SERPL CALCULATED.3IONS-SCNC: 6 MMOL/L (ref 5–15)
BASOPHILS # BLD AUTO: 0.04 10*3/MM3 (ref 0–0.2)
BASOPHILS NFR BLD AUTO: 0.5 % (ref 0–1.5)
BUN SERPL-MCNC: 18 MG/DL (ref 8–23)
BUN/CREAT SERPL: 16.8 (ref 7–25)
CALCIUM SPEC-SCNC: 8.6 MG/DL (ref 8.6–10.5)
CHLORIDE SERPL-SCNC: 111 MMOL/L (ref 98–107)
CO2 SERPL-SCNC: 25 MMOL/L (ref 22–29)
CREAT SERPL-MCNC: 1.07 MG/DL (ref 0.57–1)
DEPRECATED RDW RBC AUTO: 50.4 FL (ref 37–54)
EGFRCR SERPLBLD CKD-EPI 2021: 52 ML/MIN/1.73
EOSINOPHIL # BLD AUTO: 0.37 10*3/MM3 (ref 0–0.4)
EOSINOPHIL NFR BLD AUTO: 4.7 % (ref 0.3–6.2)
ERYTHROCYTE [DISTWIDTH] IN BLOOD BY AUTOMATED COUNT: 13.3 % (ref 12.3–15.4)
GLUCOSE SERPL-MCNC: 142 MG/DL (ref 65–99)
HCT VFR BLD AUTO: 36.8 % (ref 34–46.6)
HGB BLD-MCNC: 12.3 G/DL (ref 12–15.9)
IMM GRANULOCYTES # BLD AUTO: 0.21 10*3/MM3 (ref 0–0.05)
IMM GRANULOCYTES NFR BLD AUTO: 2.7 % (ref 0–0.5)
LYMPHOCYTES # BLD AUTO: 2.2 10*3/MM3 (ref 0.7–3.1)
LYMPHOCYTES NFR BLD AUTO: 27.8 % (ref 19.6–45.3)
MCH RBC QN AUTO: 35.1 PG (ref 26.6–33)
MCHC RBC AUTO-ENTMCNC: 33.4 G/DL (ref 31.5–35.7)
MCV RBC AUTO: 105.1 FL (ref 79–97)
MONOCYTES # BLD AUTO: 0.82 10*3/MM3 (ref 0.1–0.9)
MONOCYTES NFR BLD AUTO: 10.4 % (ref 5–12)
NEUTROPHILS NFR BLD AUTO: 4.26 10*3/MM3 (ref 1.7–7)
NEUTROPHILS NFR BLD AUTO: 53.9 % (ref 42.7–76)
PLATELET # BLD AUTO: 134 10*3/MM3 (ref 140–450)
PMV BLD AUTO: 10.1 FL (ref 6–12)
POTASSIUM SERPL-SCNC: 4.8 MMOL/L (ref 3.5–5.2)
RBC # BLD AUTO: 3.5 10*6/MM3 (ref 3.77–5.28)
SODIUM SERPL-SCNC: 142 MMOL/L (ref 136–145)
WBC NRBC COR # BLD AUTO: 7.9 10*3/MM3 (ref 3.4–10.8)

## 2024-06-23 PROCEDURE — 80048 BASIC METABOLIC PNL TOTAL CA: CPT | Performed by: INTERNAL MEDICINE

## 2024-06-23 PROCEDURE — 85025 COMPLETE CBC W/AUTO DIFF WBC: CPT | Performed by: INTERNAL MEDICINE

## 2024-06-23 RX ORDER — METOPROLOL SUCCINATE 50 MG/1
50 TABLET, EXTENDED RELEASE ORAL EVERY 12 HOURS SCHEDULED
Qty: 60 TABLET | Refills: 0 | Status: SHIPPED | OUTPATIENT
Start: 2024-06-23 | End: 2024-07-23

## 2024-06-23 RX ADMIN — AMOXICILLIN AND CLAVULANATE POTASSIUM 1 TABLET: 875; 125 TABLET, FILM COATED ORAL at 09:34

## 2024-06-23 RX ADMIN — METOPROLOL SUCCINATE 50 MG: 50 TABLET, FILM COATED, EXTENDED RELEASE ORAL at 09:34

## 2024-06-23 RX ADMIN — ASPIRIN 81 MG: 81 TABLET, COATED ORAL at 09:33

## 2024-06-23 RX ADMIN — GABAPENTIN 100 MG: 100 CAPSULE ORAL at 09:34

## 2024-06-23 RX ADMIN — SERTRALINE 25 MG: 25 TABLET, FILM COATED ORAL at 09:34

## 2024-06-23 RX ADMIN — APIXABAN 5 MG: 5 TABLET, FILM COATED ORAL at 09:34

## 2024-06-23 RX ADMIN — DIVALPROEX SODIUM 125 MG: 125 TABLET, DELAYED RELEASE ORAL at 09:33

## 2024-06-23 RX ADMIN — DOXYCYCLINE 100 MG: 100 CAPSULE ORAL at 09:33

## 2024-06-23 RX ADMIN — ATORVASTATIN CALCIUM 40 MG: 20 TABLET, FILM COATED ORAL at 09:33

## 2024-06-23 RX ADMIN — DILTIAZEM HYDROCHLORIDE 240 MG: 240 CAPSULE, COATED, EXTENDED RELEASE ORAL at 09:34

## 2024-06-23 NOTE — PROGRESS NOTES
[facility] to follow for home SN and PT.  All info was verified with Jovita, health care surrogate and scheduling needs to be done via her at [phone].  Per Zeinab, auth per PCP Dr Sebastian was given for home health care.  Noted plan for D/C today.

## 2024-06-23 NOTE — DISCHARGE SUMMARY
Patient Name: Madelene Naegele  : 1940  MRN: [id number]    Date of Admission: 2024  Date of Discharge:  2024  Primary Care Physician: Ava Sebastian MD      Chief Complaint:   Wound Check      Discharge Diagnoses     Active Hospital Problems    Diagnosis  POA    **Cellulitis of left leg [L03.116]  Yes    HTN (hypertension) [I10]  Unknown    Atrial fibrillation, persistent [I48.19]  Unknown    Hyperlipidemia [E78.5]  Unknown    History of stroke [Z86.73]  Not Applicable    CAD (coronary artery disease) [I25.10]  Unknown    Type 2 diabetes mellitus, without long-term current use of insulin [E11.9]  Unknown    Severe sepsis [A41.9, R65.20]  Unknown      Resolved Hospital Problems   No resolved problems to display.        Hospital Course   Ms. Naegele is a 83 y.o. non-smoker with a history of persistent atrial fibrillation, hypertension, coronary artery disease, stroke, hyperlipidemia, and type 2 diabetes mellitus that presents to [facility] complaining of a wound to her left leg. She reports she was scratched by her cat 2 days ago and the wound has worsened. She reports redness around the wound and swelling in the leg. She denies leg pain, fever, and chills. She initially presented to the [facility] where lab work revealed creatinine 2.74, BUN 55, WBC 17.82, platelets 132, lactate 3.5 and repeat 2.1. An  x-ray showed soft tissue swelling without evidence of radiopaque foreign bodies. She was found to be hypotensive and received a sepsis fluid bolus. She also received Zosyn and Vancomycin and was transferred for further evaluation.     1. Left leg cellulitis/cat scratch, was on IV Zosyn and has been transitioned to Augmentin and wi doxycycline to complete a course.  WBC is normal and patient is afebrile and left leg cellulitis is improving quite well.    2.  History of atrial fibrillation, on aspirin, Eliquis, Cardizem which was continued.  Given episodes of RVR cardiology consult was  obtained and metoprolol was initiated with which heart rate is now better controlled.  Cleared by cardiology to be discharged home.    3.  Neuropathy, Neurontin.    4.  Hyperlipidemia, on statins.    5.  History of coronary artery disease, continue with aspirin, metoprolol and statins.    Copied text on this note has been reviewed by me on 6/23/2024    Day of Discharge         Physical Exam:  Temp:  [97 °F (36.1 °C)-98.8 °F (37.1 °C)] 98.8 °F (37.1 °C)  Heart Rate:  [75-98] 98  Resp:  [16] 16  BP: (114-131)/(66-89) 131/89  Body mass index is 26.92 kg/m².  Physical Exam  HEENT: PERRLA, extraocular movements intact, Scleras no icterus  Neck: Supple, no JVD  Cardiovascular: Irregular/tachycardic with normal S1 and S2.  Respiratory: Fairly clear to auscultation bilaterally with no wheezes  GI: Soft, nontender, bowel sounds present  Extremities: No edema, palpable pulses, left leg redness, erythema, edema has been improving  Neurologic: Grossly nonfocal, no facial asymmetry      Consultants     Consult Orders (all) (From admission, onward)       Start     Ordered    06/20/24 1050  Inpatient Cardiology Consult  Once        Specialty:  Cardiology  Provider:  Jh Zendejas MD    06/20/24 1050                  Procedures     * Surgery not found *    Imaging Results (All)       Procedure Component Value Units Date/Time    XR Tibia Fibula 2 View Left [[id number]] Collected: 06/17/24 1612     Updated: 06/17/24 1616    Narrative:      XR TIBIA FIBULA 2 VW LEFT-     HISTORY: Female who is 83 years-old, sepsis, scratch     TECHNIQUE: Frontal view of the chest, frontal and lateral views of the  left lower leg     COMPARISON: None available     FINDINGS:  Chest x-ray: The heart is enlarged. Pulmonary vasculature is  unremarkable. No focal pulmonary consolidation, pleural effusion, or  pneumothorax. No acute osseous process.     Left lower leg: Soft tissue swelling at the left lower leg may be  evidence of inflammation,  infection, injury, venous insufficiency. No  radiopaque foreign bodies are noted. No acute fracture, erosion, or  dislocation is identified. Degenerative spurring is seen at the knee. At  least moderate calcaneal spurring is apparent, but only partly included.     Follow-up/further evaluation can be obtained as indications persist.       Impression:      As described.     This report was finalized on 6/17/2024 4:12 PM by Dr. Elliot Wilkinson M.D on Workstation: [id number]       XR Chest 1 View [[id number]] Collected: 06/17/24 1556     Updated: 06/17/24 1601    Narrative:      XR CHEST 1 VW-     HISTORY: Female who is 83 years-old, sepsis, scratch     TECHNIQUE: Frontal view of the chest, frontal and lateral views of the  left lower leg     COMPARISON: None available     FINDINGS:     Chest x-ray: The heart is enlarged. Pulmonary vasculature is  unremarkable. No focal pulmonary consolidation, pleural effusion, or  pneumothorax. No acute osseous process.     Left lower leg: Soft tissue swelling at the left lower leg may be  evidence of inflammation, infection, injury, venous insufficiency. No  radiopaque foreign bodies are noted. No acute fracture, erosion, or  dislocation is identified. Degenerative spurring is seen at the knee. At  least moderate calcaneal spurring is apparent, but only partly included.     Follow-up/further evaluation can be obtained as indications persist.       Impression:      As described.     This report was finalized on 6/17/2024 3:58 PM by Dr. Elliot Wilkinson M.D on Workstation: [id number]                 Pertinent Labs     Results from last 7 days   Lab Units 06/23/24  0511 06/22/24  0522 06/21/24 0459 06/20/24  0335   WBC 10*3/mm3 7.90 6.98 6.44 6.17   HEMOGLOBIN g/dL 12.3 12.1 11.7* 11.9*   PLATELETS 10*3/mm3 134* 119* 113* 111*     Results from last 7 days   Lab Units 06/23/24  0511 06/22/24  0522 06/21/24  0459 06/20/24  0335   SODIUM mmol/L 142 142 143 139   POTASSIUM mmol/L 4.8 4.3  "4.2 3.4*   CHLORIDE mmol/L 111* 113* 113* 108*   CO2 mmol/L 25.0 22.8 24.0 21.4*   BUN mg/dL 18 14 15 19   CREATININE mg/dL 1.07* 0.88 1.00 1.03*   GLUCOSE mg/dL 142* 139* 140* 139*   EGFR mL/min/1.73 52.0* 65.7 56.4* 54.4*     Results from last 7 days   Lab Units 06/17/24  1518   ALBUMIN g/dL 3.7   BILIRUBIN mg/dL 1.1   ALK PHOS U/L 46   AST (SGOT) U/L 31   ALT (SGPT) U/L 12     Results from last 7 days   Lab Units 06/23/24  0511 06/22/24  0522 06/21/24  0459 06/20/24  0335 06/18/24  0355 06/17/24  1518   CALCIUM mg/dL 8.6 8.2* 7.8* 7.9*   < > 9.4   ALBUMIN g/dL  --   --   --   --   --  3.7    < > = values in this interval not displayed.       Results from last 7 days   Lab Units 06/18/24  0355   DIGOXIN LVL ng/mL <0.30*           Invalid input(s): \"LDLCALC\"  Results from last 7 days   Lab Units 06/17/24  1518   BLOODCX  No growth at 5 days  No growth at 5 days         Test Results Pending at Discharge       Discharge Details        Discharge Medications        New Medications        Instructions Start Date   amoxicillin-clavulanate 875-125 MG per tablet  Commonly known as: AUGMENTIN   1 tablet, Oral, 2 Times Daily      doxycycline 100 MG capsule  Commonly known as: MONODOX   100 mg, Oral, Every 12 Hours Scheduled             Changes to Medications        Instructions Start Date   metoprolol succinate XL 50 MG 24 hr tablet  Commonly known as: TOPROL-XL  What changed: when to take this   50 mg, Oral, Every 12 Hours Scheduled             Continue These Medications        Instructions Start Date   apixaban 5 MG tablet tablet  Commonly known as: ELIQUIS   5 mg, Oral, 2 Times Daily      aspirin 81 MG EC tablet   81 mg, Oral, Daily      atorvastatin 40 MG tablet  Commonly known as: LIPITOR   40 mg, Oral, Daily      cholecalciferol 25 MCG (1000 UT) tablet  Commonly known as: VITAMIN D3   2,000 Units, Oral, Daily      dilTIAZem  MG 24 hr capsule  Commonly known as: CARDIZEM CD   240 mg, Oral, 2 Times Daily    "   divalproex 125 MG DR tablet  Commonly known as: DEPAKOTE   125 mg, Oral      fluorometholone 0.1 % ophthalmic suspension  Commonly known as: EFLONE   1 drop, Both Eyes, 4 Times Daily, PRN      gabapentin 100 MG capsule  Commonly known as: NEURONTIN   100 mg, Oral, 3 Times Daily      hydrOXYzine 25 MG tablet  Commonly known as: ATARAX   25 mg, Oral, 3 Times Daily PRN      lisinopril 5 MG tablet  Commonly known as: PRINIVIL,ZESTRIL   5 mg, Oral, Daily      metFORMIN 500 MG tablet  Commonly known as: GLUCOPHAGE   500 mg, Oral, 2 Times Daily With Meals      potassium chloride 20 MEQ tablet controlled-release ER tablet  Commonly known as: K-DUR,KLOR-CON   20 mEq, Oral, Daily      sertraline 25 MG tablet  Commonly known as: ZOLOFT   25 mg, Oral, Daily      torsemide 20 MG tablet  Commonly known as: DEMADEX   20 mg, Oral, Daily      vitamin C 250 MG tablet  Commonly known as: ASCORBIC ACID   500 mg, Oral, Daily               No Known Allergies    Discharge Disposition:  Home or Self Care      Discharge Diet:  No active diet order      Discharge Activity:   Activity Instructions       Activity as Tolerated      Activity as Tolerated              CODE STATUS:    Code Status and Medical Interventions:   Ordered at: 06/17/24 1991     Code Status (Patient has no pulse and is not breathing):    CPR (Attempt to Resuscitate)     Medical Interventions (Patient has pulse or is breathing):    Full Support       No future appointments.  Additional Instructions for the Follow-ups that You Need to Schedule       Discharge Follow-up with PCP   As directed       Currently Documented PCP:    Ava Sebastian MD    PCP Phone Number:    [phone]     Follow Up Details: 1 week        Discharge Follow-up with PCP   As directed       Currently Documented PCP:    Ava Sebastian MD    PCP Phone Number:    [phone]     Follow Up Details: 1 week        Discharge Follow-up with Specified Provider: ; 1 Month   As directed      To:     Follow Up: 1 Month               Follow-up Information       Ava Sebastian MD .    Specialty: Internal Medicine  Why: 1 week  Contact information:  [address]  [place] [address]  [phone]               Ava Sebastian MD .    Specialty: Internal Medicine  Why: 1 week  Contact information:  [address]  [place] [address]  [phone]               Ava Sebastian MD .    Specialty: Internal Medicine  Contact information:  [address]  [place] [address]  [phone]                             Additional Instructions for the Follow-ups that You Need to Schedule       Discharge Follow-up with PCP   As directed       Currently Documented PCP:    Ava Sebastian MD    PCP Phone Number:    [phone]     Follow Up Details: 1 week        Discharge Follow-up with PCP   As directed       Currently Documented PCP:    Ava Sebastian MD    PCP Phone Number:    [phone]     Follow Up Details: 1 week        Discharge Follow-up with Specified Provider: ; 1 Month   As directed      To:    Follow Up: 1 Month            Time Spent on Discharge:  Greater than 30 minutes      Damon Mohan MD  [facility]  06/23/24  15:23 EDT

## 2024-06-24 NOTE — CASE MANAGEMENT/SOCIAL WORK
Case Management Discharge Note      Final Note: Home with caregivers, Leela APARICIO. No additional CCP needs.         Selected Continued Care - Discharged on 6/23/2024 Admission date: 6/17/2024 - Discharge disposition: Home or Self Care      Destination    No services have been selected for the patient.                Durable Medical Equipment    No services have been selected for the patient.                Dialysis/Infusion    No services have been selected for the patient.                Home Medical Care    No services have been selected for the patient.                Therapy    No services have been selected for the patient.                Community Resources    No services have been selected for the patient.                Community & DME    No services have been selected for the patient.                         Final Discharge Disposition Code: 06 - home with home health care

## 2024-06-25 ENCOUNTER — HOME CARE VISIT (OUTPATIENT)
Dept: HOME HEALTH SERVICES | Facility: HOME HEALTHCARE | Age: 84
End: 2024-06-25

## 2024-06-27 ENCOUNTER — READMISSION MANAGEMENT (OUTPATIENT)
Dept: CALL CENTER | Facility: HOSPITAL | Age: 84
End: 2024-06-27
Payer: MEDICARE

## 2024-06-27 NOTE — OUTREACH NOTE
Medical Week 1 Survey      Flowsheet Row Responses   [facility] patient discharged from? [place]   Does the patient have one of the following disease processes/diagnoses(primary or secondary)? Other   Week 1 attempt successful? No   Unsuccessful attempts Attempt 1            Brisa Quinones Registered Nurse

## 2024-06-28 ENCOUNTER — HOME CARE VISIT (OUTPATIENT)
Dept: HOME HEALTH SERVICES | Facility: HOME HEALTHCARE | Age: 84
End: 2024-06-28
Payer: MEDICARE

## 2024-07-01 ENCOUNTER — HOME CARE VISIT (OUTPATIENT)
Dept: HOME HEALTH SERVICES | Facility: HOME HEALTHCARE | Age: 84
End: 2024-07-01
Payer: MEDICARE

## 2024-07-01 ENCOUNTER — HOME CARE VISIT (OUTPATIENT)
Dept: HOME HEALTH SERVICES | Facility: HOME HEALTHCARE | Age: 84
End: 2024-07-01

## 2024-07-01 NOTE — CASE COMMUNICATION
SN COULD NOT CONTACT PATIENT BY PHONE SO SN DROVE BY PT HOME IN HOPES OF DOING THE START OF CARE. NO ANSWER TO DOOR.  SN HAS BEEN UNABLE TO CONTACT ANY OF HER OTHER CONTACTS, HER SISTER'S NUMBER IS DISCONNECTED AND THE OTHER I WAS ABLE TO LEAVE A VOICE MESSAGE.

## 2024-07-01 NOTE — CASE COMMUNICATION
"ASUNCION  THIS IS THE CONTACT NUMBER WE NEED TO CALL TO SCHEDULE VISITS.  MS BRANDON WAS UPSET I DIDN'T KNOW TO CALL HER INSTEAD OF THE PATIENT'S NUMBER TO SCHEDULE VISITS.  \" PT IS INCAPABLE OF MAKING THOSE DECISIONS\"    CAMILLE BRANDON ([facility]) Other  [phone]     No No   Accepted/Declined?    Barriers Identified    English Proficiency    Active ? Yes, Active    "

## 2024-07-02 ENCOUNTER — HOME CARE VISIT (OUTPATIENT)
Dept: HOME HEALTH SERVICES | Facility: HOME HEALTHCARE | Age: 84
End: 2024-07-02
Payer: MEDICARE

## 2024-07-02 ENCOUNTER — READMISSION MANAGEMENT (OUTPATIENT)
Dept: CALL CENTER | Facility: HOSPITAL | Age: 84
End: 2024-07-02
Payer: MEDICARE

## 2024-07-02 VITALS
RESPIRATION RATE: 16 BRPM | HEART RATE: 97 BPM | DIASTOLIC BLOOD PRESSURE: 64 MMHG | SYSTOLIC BLOOD PRESSURE: 107 MMHG | TEMPERATURE: 99.2 F | OXYGEN SATURATION: 97 %

## 2024-07-02 PROCEDURE — G0299 HHS/HOSPICE OF RN EA 15 MIN: HCPCS

## 2024-07-02 PROCEDURE — G0151 HHCP-SERV OF PT,EA 15 MIN: HCPCS

## 2024-07-02 NOTE — OUTREACH NOTE
Medical Week 1 Survey      Flowsheet Row Responses   [facility] patient discharged from? [place]   Does the patient have one of the following disease processes/diagnoses(primary or secondary)? Other   Week 1 attempt successful? Yes   Call start time 1126   Call end time 1129   Discharge diagnosis Cellulitis of left leg   Person spoke with today (if not patient) and relationship Jovita [facility]   Meds reviewed with patient/caregiver? Yes   Does the patient have all medications ordered at discharge? Yes   Is the patient taking all medications as directed (includes completed medication regime)? Yes   Does the patient have a primary care provider?  Yes   Comments regarding PCP Seen her pcp last week. Next week patient seeing her own cardiologist- 07/10 Dr. Womack   What is the Home health agency?  [facility]   Has home health visited the patient within 72 hours of discharge? Yes   Psychosocial issues? No   Did the patient receive a copy of their discharge instructions? Yes   Nursing interventions Reviewed instructions with patient   What is the patient's perception of their health status since discharge? Improving   Is the patient/caregiver able to teach back signs and symptoms related to disease process for when to call PCP? Yes   Is the patient/caregiver able to teach back signs and symptoms related to disease process for when to call 911? Yes   Is the patient/caregiver able to teach back the hierarchy of who to call/visit for symptoms/problems? PCP, Specialist, Home health nurse, Urgent Care, ED, 911 Yes   Week 1 call completed? Yes   Graduated Yes   Wrap up additional comments Per Jovita patient doing very well. No concerns or questions noted.   Call end time 1129            Jacqueline BELL - Registered Nurse

## 2024-07-03 VITALS
SYSTOLIC BLOOD PRESSURE: 107 MMHG | RESPIRATION RATE: 16 BRPM | OXYGEN SATURATION: 97 % | HEART RATE: 77 BPM | TEMPERATURE: 99.2 F | DIASTOLIC BLOOD PRESSURE: 64 MMHG

## 2024-07-03 NOTE — CASE COMMUNICATION
"SOC Note: call poa to schedul all appts, pt has paid caregiver with her today for SOC SNV    Home Health ordered for: disciplines SN, PT  anticipate sn 1w4 for wound healing    Reason  for Referal /Primary Dx/Co-morbidities: cat scratch wound LLE healing r/t HX DM2/ CVA    Focus of Care: wound healing r/t cat scratch LLE, hx DM2, CVA, Chronic AFIB    Patient's goal(s):\" Get another cat\"  \" no infection\"    Current Functional status/mobi lity/DME: ambualtes w/ walker    HB status/Living Arrangements: lives alone in own patio home    Skin Integrity/wound status:  scab LLE, otherwise integ intact    Code Status: FULL    Fall Risk/Safety concerns: FALL RISK    Educated on Emergency Plan, steps to take prior to going to the ER and when to Call [facility]    Medication issues/Concerns:POA MANAGES MEDS FOR PT/ WEEKLY MED PLANNER    Additional Problems/Concerns: NONE     SDOH Barriers (i.e. caregiver concerns, social isolation, transportation, food insecurity, environment, income etc.)/Need for MSW: NO"

## 2024-07-03 NOTE — HOME HEALTH
"SOC Note: call poa to schedul all appts, pt has paid caregiver with her today for SOC SNV    Home Health ordered for: disciplines SN, PT    Reason  for Referal /Primary Dx/Co-morbidities: cat scratch wound LLE healing r/t HX DM2/ CVA    Focus of Care: wound healing r/t cat scratch LLE, hx DM2, CVA, Chronic AFIB    Patient's goal(s):\" Get another cat\"  \" no infection\"    Current Functional status/mobility/DME: ambualtes w/ walker    HB status/Living Arrangements: lives alone in own patio home    Skin Integrity/wound status:  scab LLE, otherwise integ intact    Code Status: FULL    Fall Risk/Safety concerns: FALL RISK    Educated on Emergency Plan, steps to take prior to going to the ER and when to Call [facility]    Medication issues/Concerns:POA MANAGES MEDS FOR PT/ WEEKLY MED PLANNER    Additional Problems/Concerns: NONE    SDOH Barriers (i.e. caregiver concerns, social isolation, transportation, food insecurity, environment, income etc.)/Need for MSW: NO"

## 2024-07-09 ENCOUNTER — HOME CARE VISIT (OUTPATIENT)
Dept: HOME HEALTH SERVICES | Facility: HOME HEALTHCARE | Age: 84
End: 2024-07-09
Payer: MEDICARE

## 2024-07-09 VITALS
SYSTOLIC BLOOD PRESSURE: 110 MMHG | HEART RATE: 74 BPM | DIASTOLIC BLOOD PRESSURE: 68 MMHG | RESPIRATION RATE: 18 BRPM | OXYGEN SATURATION: 97 % | TEMPERATURE: 98.1 F

## 2024-07-09 PROCEDURE — G0493 RN CARE EA 15 MIN HH/HOSPICE: HCPCS

## 2024-07-09 PROCEDURE — G0151 HHCP-SERV OF PT,EA 15 MIN: HCPCS

## 2024-07-10 VITALS
RESPIRATION RATE: 18 BRPM | HEART RATE: 97 BPM | TEMPERATURE: 99.4 F | OXYGEN SATURATION: 97 % | SYSTOLIC BLOOD PRESSURE: 90 MMHG | DIASTOLIC BLOOD PRESSURE: 60 MMHG

## 2024-07-11 ENCOUNTER — HOME CARE VISIT (OUTPATIENT)
Dept: HOME HEALTH SERVICES | Facility: HOME HEALTHCARE | Age: 84
End: 2024-07-11
Payer: MEDICARE

## 2024-07-11 VITALS
OXYGEN SATURATION: 97 % | SYSTOLIC BLOOD PRESSURE: 104 MMHG | TEMPERATURE: 98.2 F | DIASTOLIC BLOOD PRESSURE: 62 MMHG | HEART RATE: 60 BPM | RESPIRATION RATE: 17 BRPM

## 2024-07-11 PROCEDURE — G0151 HHCP-SERV OF PT,EA 15 MIN: HCPCS

## 2024-07-16 ENCOUNTER — HOME CARE VISIT (OUTPATIENT)
Dept: HOME HEALTH SERVICES | Facility: HOME HEALTHCARE | Age: 84
End: 2024-07-16
Payer: MEDICARE

## 2024-07-16 VITALS
HEART RATE: 85 BPM | OXYGEN SATURATION: 100 % | SYSTOLIC BLOOD PRESSURE: 112 MMHG | RESPIRATION RATE: 16 BRPM | DIASTOLIC BLOOD PRESSURE: 64 MMHG | TEMPERATURE: 97.9 F

## 2024-07-16 PROCEDURE — G0151 HHCP-SERV OF PT,EA 15 MIN: HCPCS

## 2024-07-17 ENCOUNTER — HOME CARE VISIT (OUTPATIENT)
Dept: HOME HEALTH SERVICES | Facility: HOME HEALTHCARE | Age: 84
End: 2024-07-17
Payer: MEDICARE

## 2024-07-17 PROCEDURE — G0162 HHC RN E&M PLAN SVS, 15 MIN: HCPCS

## 2024-07-18 ENCOUNTER — HOME CARE VISIT (OUTPATIENT)
Dept: HOME HEALTH SERVICES | Facility: HOME HEALTHCARE | Age: 84
End: 2024-07-18
Payer: MEDICARE

## 2024-07-18 VITALS
DIASTOLIC BLOOD PRESSURE: 70 MMHG | OXYGEN SATURATION: 99 % | TEMPERATURE: 99 F | HEART RATE: 68 BPM | RESPIRATION RATE: 16 BRPM | SYSTOLIC BLOOD PRESSURE: 110 MMHG

## 2024-07-18 VITALS
SYSTOLIC BLOOD PRESSURE: 110 MMHG | RESPIRATION RATE: 16 BRPM | OXYGEN SATURATION: 99 % | TEMPERATURE: 98.6 F | DIASTOLIC BLOOD PRESSURE: 58 MMHG | HEART RATE: 73 BPM

## 2024-07-18 PROCEDURE — G0151 HHCP-SERV OF PT,EA 15 MIN: HCPCS

## 2024-07-23 ENCOUNTER — HOME CARE VISIT (OUTPATIENT)
Dept: HOME HEALTH SERVICES | Facility: HOME HEALTHCARE | Age: 84
End: 2024-07-23
Payer: MEDICARE

## 2024-07-23 VITALS
HEART RATE: 58 BPM | DIASTOLIC BLOOD PRESSURE: 58 MMHG | RESPIRATION RATE: 16 BRPM | SYSTOLIC BLOOD PRESSURE: 106 MMHG | TEMPERATURE: 97.8 F | OXYGEN SATURATION: 97 %

## 2024-07-23 PROCEDURE — G0151 HHCP-SERV OF PT,EA 15 MIN: HCPCS

## 2024-07-25 ENCOUNTER — HOME CARE VISIT (OUTPATIENT)
Dept: HOME HEALTH SERVICES | Facility: HOME HEALTHCARE | Age: 84
End: 2024-07-25
Payer: MEDICARE

## 2024-07-25 VITALS
OXYGEN SATURATION: 97 % | TEMPERATURE: 98.2 F | SYSTOLIC BLOOD PRESSURE: 106 MMHG | DIASTOLIC BLOOD PRESSURE: 62 MMHG | HEART RATE: 85 BPM | RESPIRATION RATE: 16 BRPM

## 2024-07-25 PROCEDURE — G0151 HHCP-SERV OF PT,EA 15 MIN: HCPCS

## 2025-02-15 ENCOUNTER — HOSPITAL ENCOUNTER (INPATIENT)
Facility: HOSPITAL | Age: 85
LOS: 10 days | Discharge: NURSING, REHAB OR LONG-TERM CARE | End: 2025-02-26
Attending: EMERGENCY MEDICINE | Admitting: INTERNAL MEDICINE
Payer: MEDICARE

## 2025-02-15 ENCOUNTER — APPOINTMENT (OUTPATIENT)
Dept: CT IMAGING | Facility: HOSPITAL | Age: 85
End: 2025-02-15
Payer: MEDICARE
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
## 2025-02-15 DIAGNOSIS — Z79.01 CHRONIC ANTICOAGULATION: ICD-10-CM

## 2025-02-15 DIAGNOSIS — S61.411A LACERATION OF RIGHT HAND WITHOUT FOREIGN BODY, INITIAL ENCOUNTER: ICD-10-CM

## 2025-02-15 DIAGNOSIS — R93.0 ABNORMAL HEAD CT: ICD-10-CM

## 2025-02-15 DIAGNOSIS — N17.9 ACUTE KIDNEY INJURY: ICD-10-CM

## 2025-02-15 DIAGNOSIS — G93.40 ACUTE ENCEPHALOPATHY: Primary | ICD-10-CM

## 2025-02-15 PROBLEM — R41.82 ALTERED MENTAL STATUS: Status: ACTIVE | Noted: 2025-02-15

## 2025-02-15 LAB
ALBUMIN SERPL-MCNC: 4 G/DL (ref 3.5–5.2)
ALBUMIN/GLOB SERPL: 1.4 G/DL
ALP SERPL-CCNC: 62 U/L (ref 39–117)
ALT SERPL W P-5'-P-CCNC: 12 U/L (ref 1–33)
ANION GAP SERPL CALCULATED.3IONS-SCNC: 13 MMOL/L (ref 5–15)
ANISOCYTOSIS BLD QL: ABNORMAL
AST SERPL-CCNC: 16 U/L (ref 1–32)
BASOPHILS # BLD MANUAL: 0 10*3/MM3 (ref 0–0.2)
BASOPHILS NFR BLD MANUAL: 0 % (ref 0–1.5)
BILIRUB SERPL-MCNC: 0.9 MG/DL (ref 0–1.2)
BILIRUB UR QL STRIP: NEGATIVE
BUN SERPL-MCNC: 27 MG/DL (ref 8–23)
BUN/CREAT SERPL: 19.6 (ref 7–25)
CALCIUM SPEC-SCNC: 9.3 MG/DL (ref 8.6–10.5)
CHLORIDE SERPL-SCNC: 101 MMOL/L (ref 98–107)
CK SERPL-CCNC: 105 U/L (ref 20–180)
CLARITY UR: CLEAR
CO2 SERPL-SCNC: 24 MMOL/L (ref 22–29)
COLOR UR: YELLOW
CREAT SERPL-MCNC: 1.38 MG/DL (ref 0.57–1)
D-LACTATE SERPL-SCNC: 1.6 MMOL/L (ref 0.5–2)
D-LACTATE SERPL-SCNC: 2.9 MMOL/L (ref 0.5–2)
DEPRECATED RDW RBC AUTO: 53 FL (ref 37–54)
EGFRCR SERPLBLD CKD-EPI 2021: 38.1 ML/MIN/1.73
EOSINOPHIL # BLD MANUAL: 0.12 10*3/MM3 (ref 0–0.4)
EOSINOPHIL NFR BLD MANUAL: 1 % (ref 0.3–6.2)
ERYTHROCYTE [DISTWIDTH] IN BLOOD BY AUTOMATED COUNT: 13.4 % (ref 12.3–15.4)
GEN 5 1HR TROPONIN T REFLEX: 10 NG/L
GLOBULIN UR ELPH-MCNC: 2.9 GM/DL
GLUCOSE SERPL-MCNC: 198 MG/DL (ref 65–99)
GLUCOSE UR STRIP-MCNC: ABNORMAL MG/DL
HCT VFR BLD AUTO: 46.9 % (ref 34–46.6)
HGB BLD-MCNC: 16.3 G/DL (ref 12–15.9)
HGB UR QL STRIP.AUTO: NEGATIVE
KETONES UR QL STRIP: NEGATIVE
LEUKOCYTE ESTERASE UR QL STRIP.AUTO: NEGATIVE
LYMPHOCYTES # BLD MANUAL: 0.96 10*3/MM3 (ref 0.7–3.1)
LYMPHOCYTES NFR BLD MANUAL: 9 % (ref 5–12)
MACROCYTES BLD QL SMEAR: ABNORMAL
MAGNESIUM SERPL-MCNC: 1.8 MG/DL (ref 1.6–2.4)
MCH RBC QN AUTO: 37.3 PG (ref 26.6–33)
MCHC RBC AUTO-ENTMCNC: 34.8 G/DL (ref 31.5–35.7)
MCV RBC AUTO: 107.3 FL (ref 79–97)
MONOCYTES # BLD: 1.08 10*3/MM3 (ref 0.1–0.9)
NEUTROPHILS # BLD AUTO: 9.81 10*3/MM3 (ref 1.7–7)
NEUTROPHILS NFR BLD MANUAL: 82 % (ref 42.7–76)
NITRITE UR QL STRIP: NEGATIVE
NRBC BLD AUTO-RTO: 0 /100 WBC (ref 0–0.2)
PH UR STRIP.AUTO: 6 [PH] (ref 5–8)
PLAT MORPH BLD: NORMAL
PLATELET # BLD AUTO: 170 10*3/MM3 (ref 140–450)
PMV BLD AUTO: 10.2 FL (ref 6–12)
POTASSIUM SERPL-SCNC: 4.4 MMOL/L (ref 3.5–5.2)
PROT SERPL-MCNC: 6.9 G/DL (ref 6–8.5)
PROT UR QL STRIP: NEGATIVE
RBC # BLD AUTO: 4.37 10*6/MM3 (ref 3.77–5.28)
SODIUM SERPL-SCNC: 138 MMOL/L (ref 136–145)
SP GR UR STRIP: 1.01 (ref 1–1.03)
TROPONIN T NUMERIC DELTA: -1 NG/L
TROPONIN T SERPL HS-MCNC: 11 NG/L
UROBILINOGEN UR QL STRIP: ABNORMAL
VALPROATE SERPL-MCNC: 30 MCG/ML (ref 50–125)
VARIANT LYMPHS NFR BLD MANUAL: 8 % (ref 19.6–45.3)
WBC MORPH BLD: NORMAL
WBC NRBC COR # BLD AUTO: 11.96 10*3/MM3 (ref 3.4–10.8)

## 2025-02-15 PROCEDURE — 80164 ASSAY DIPROPYLACETIC ACD TOT: CPT | Performed by: EMERGENCY MEDICINE

## 2025-02-15 PROCEDURE — 85007 BL SMEAR W/DIFF WBC COUNT: CPT | Performed by: EMERGENCY MEDICINE

## 2025-02-15 PROCEDURE — G0378 HOSPITAL OBSERVATION PER HR: HCPCS

## 2025-02-15 PROCEDURE — 84484 ASSAY OF TROPONIN QUANT: CPT | Performed by: EMERGENCY MEDICINE

## 2025-02-15 PROCEDURE — 83605 ASSAY OF LACTIC ACID: CPT | Performed by: EMERGENCY MEDICINE

## 2025-02-15 PROCEDURE — 83735 ASSAY OF MAGNESIUM: CPT | Performed by: EMERGENCY MEDICINE

## 2025-02-15 PROCEDURE — 70450 CT HEAD/BRAIN W/O DYE: CPT

## 2025-02-15 PROCEDURE — 82550 ASSAY OF CK (CPK): CPT | Performed by: EMERGENCY MEDICINE

## 2025-02-15 PROCEDURE — P9612 CATHETERIZE FOR URINE SPEC: HCPCS

## 2025-02-15 PROCEDURE — 93010 ELECTROCARDIOGRAM REPORT: CPT | Performed by: INTERNAL MEDICINE

## 2025-02-15 PROCEDURE — 85025 COMPLETE CBC W/AUTO DIFF WBC: CPT | Performed by: EMERGENCY MEDICINE

## 2025-02-15 PROCEDURE — 93005 ELECTROCARDIOGRAM TRACING: CPT | Performed by: EMERGENCY MEDICINE

## 2025-02-15 PROCEDURE — 80053 COMPREHEN METABOLIC PANEL: CPT | Performed by: EMERGENCY MEDICINE

## 2025-02-15 PROCEDURE — 81003 URINALYSIS AUTO W/O SCOPE: CPT | Performed by: EMERGENCY MEDICINE

## 2025-02-15 PROCEDURE — 36415 COLL VENOUS BLD VENIPUNCTURE: CPT

## 2025-02-15 PROCEDURE — 99285 EMERGENCY DEPT VISIT HI MDM: CPT

## 2025-02-15 PROCEDURE — 87040 BLOOD CULTURE FOR BACTERIA: CPT | Performed by: EMERGENCY MEDICINE

## 2025-02-15 RX ORDER — CHOLECALCIFEROL (VITAMIN D3) 25 MCG
2000 TABLET ORAL DAILY
Status: DISCONTINUED | OUTPATIENT
Start: 2025-02-16 | End: 2025-02-26 | Stop reason: HOSPADM

## 2025-02-15 RX ORDER — POLYETHYLENE GLYCOL 3350 17 G/17G
17 POWDER, FOR SOLUTION ORAL DAILY PRN
Status: DISCONTINUED | OUTPATIENT
Start: 2025-02-15 | End: 2025-02-26 | Stop reason: HOSPADM

## 2025-02-15 RX ORDER — AMOXICILLIN 250 MG
2 CAPSULE ORAL 2 TIMES DAILY PRN
Status: DISCONTINUED | OUTPATIENT
Start: 2025-02-15 | End: 2025-02-26 | Stop reason: HOSPADM

## 2025-02-15 RX ORDER — ONDANSETRON 2 MG/ML
4 INJECTION INTRAMUSCULAR; INTRAVENOUS EVERY 6 HOURS PRN
Status: DISCONTINUED | OUTPATIENT
Start: 2025-02-15 | End: 2025-02-26 | Stop reason: HOSPADM

## 2025-02-15 RX ORDER — ACETAMINOPHEN 160 MG/5ML
650 SOLUTION ORAL EVERY 4 HOURS PRN
Status: DISCONTINUED | OUTPATIENT
Start: 2025-02-15 | End: 2025-02-20

## 2025-02-15 RX ORDER — ONDANSETRON 4 MG/1
4 TABLET, ORALLY DISINTEGRATING ORAL EVERY 6 HOURS PRN
Status: DISCONTINUED | OUTPATIENT
Start: 2025-02-15 | End: 2025-02-26 | Stop reason: HOSPADM

## 2025-02-15 RX ORDER — POTASSIUM CHLORIDE 1500 MG/1
40 TABLET, EXTENDED RELEASE ORAL DAILY
Status: DISCONTINUED | OUTPATIENT
Start: 2025-02-16 | End: 2025-02-21

## 2025-02-15 RX ORDER — NICOTINE POLACRILEX 4 MG
15 LOZENGE BUCCAL
Status: DISCONTINUED | OUTPATIENT
Start: 2025-02-15 | End: 2025-02-26 | Stop reason: HOSPADM

## 2025-02-15 RX ORDER — METOPROLOL SUCCINATE 25 MG/1
50 TABLET, EXTENDED RELEASE ORAL DAILY
Status: DISCONTINUED | OUTPATIENT
Start: 2025-02-16 | End: 2025-02-20

## 2025-02-15 RX ORDER — SODIUM CHLORIDE 0.9 % (FLUSH) 0.9 %
10 SYRINGE (ML) INJECTION AS NEEDED
Status: DISCONTINUED | OUTPATIENT
Start: 2025-02-15 | End: 2025-02-20

## 2025-02-15 RX ORDER — BISACODYL 5 MG/1
5 TABLET, DELAYED RELEASE ORAL DAILY PRN
Status: DISCONTINUED | OUTPATIENT
Start: 2025-02-15 | End: 2025-02-26 | Stop reason: HOSPADM

## 2025-02-15 RX ORDER — DIVALPROEX SODIUM 125 MG/1
125 TABLET, DELAYED RELEASE ORAL 2 TIMES DAILY
Status: DISCONTINUED | OUTPATIENT
Start: 2025-02-16 | End: 2025-02-26 | Stop reason: HOSPADM

## 2025-02-15 RX ORDER — DILTIAZEM HYDROCHLORIDE 120 MG/1
240 CAPSULE, COATED, EXTENDED RELEASE ORAL
Status: DISCONTINUED | OUTPATIENT
Start: 2025-02-16 | End: 2025-02-26 | Stop reason: HOSPADM

## 2025-02-15 RX ORDER — DEXTROSE MONOHYDRATE 25 G/50ML
25 INJECTION, SOLUTION INTRAVENOUS
Status: DISCONTINUED | OUTPATIENT
Start: 2025-02-15 | End: 2025-02-26 | Stop reason: HOSPADM

## 2025-02-15 RX ORDER — INSULIN LISPRO 100 [IU]/ML
2-7 INJECTION, SOLUTION INTRAVENOUS; SUBCUTANEOUS
Status: DISCONTINUED | OUTPATIENT
Start: 2025-02-16 | End: 2025-02-26 | Stop reason: HOSPADM

## 2025-02-15 RX ORDER — BISACODYL 10 MG
10 SUPPOSITORY, RECTAL RECTAL DAILY PRN
Status: DISCONTINUED | OUTPATIENT
Start: 2025-02-15 | End: 2025-02-26 | Stop reason: HOSPADM

## 2025-02-15 RX ORDER — ACETAMINOPHEN 650 MG/1
650 SUPPOSITORY RECTAL EVERY 4 HOURS PRN
Status: DISCONTINUED | OUTPATIENT
Start: 2025-02-15 | End: 2025-02-20

## 2025-02-15 RX ORDER — DILTIAZEM HYDROCHLORIDE 240 MG/1
240 CAPSULE, EXTENDED RELEASE ORAL DAILY
COMMUNITY
End: 2025-02-26 | Stop reason: HOSPADM

## 2025-02-15 RX ORDER — GABAPENTIN 100 MG/1
100 CAPSULE ORAL 2 TIMES DAILY
Status: DISCONTINUED | OUTPATIENT
Start: 2025-02-16 | End: 2025-02-26 | Stop reason: HOSPADM

## 2025-02-15 RX ORDER — ASPIRIN 81 MG/1
81 TABLET ORAL DAILY
Status: DISCONTINUED | OUTPATIENT
Start: 2025-02-16 | End: 2025-02-26 | Stop reason: HOSPADM

## 2025-02-15 RX ORDER — ATORVASTATIN CALCIUM 20 MG/1
40 TABLET, FILM COATED ORAL DAILY
Status: DISCONTINUED | OUTPATIENT
Start: 2025-02-16 | End: 2025-02-26 | Stop reason: HOSPADM

## 2025-02-15 RX ORDER — IBUPROFEN 600 MG/1
1 TABLET ORAL
Status: DISCONTINUED | OUTPATIENT
Start: 2025-02-15 | End: 2025-02-26 | Stop reason: HOSPADM

## 2025-02-15 RX ORDER — HYDROXYZINE HYDROCHLORIDE 25 MG/1
25 TABLET, FILM COATED ORAL EVERY 6 HOURS PRN
Status: DISCONTINUED | OUTPATIENT
Start: 2025-02-15 | End: 2025-02-26 | Stop reason: HOSPADM

## 2025-02-15 RX ORDER — ACETAMINOPHEN 325 MG/1
650 TABLET ORAL EVERY 4 HOURS PRN
Status: DISCONTINUED | OUTPATIENT
Start: 2025-02-15 | End: 2025-02-26 | Stop reason: HOSPADM

## 2025-02-15 NOTE — CASE MANAGEMENT/SOCIAL WORK
Discharge Planning Assessment  [facility]     Patient Name: Madelene Naegele  MRN: [id number]  Today's Date: 2/15/2025    Admit Date: 2/15/2025        Discharge Needs Assessment    No documentation.                  Discharge Plan       Row Name 02/15/25 5348       Plan    Plan Comments Primary nurse gautam completed self neglect report. called report to cabinet for families [phone].  read report to staff. . Web ID # [id number]. call to  Radha completed. Fax to [facility].  GISELLE Claudio Rn [facility]                  Continued Care and Services - Admitted Since 2/15/2025    No active coordination exists for this encounter.          Demographic Summary    No documentation.                  Functional Status    No documentation.                  Psychosocial    No documentation.                  Abuse/Neglect    No documentation.                  Legal    No documentation.                  Substance Abuse    No documentation.                  Patient Forms    No documentation.                     Pauline Claudio RN

## 2025-02-15 NOTE — Clinical Note
Level of Care: Telemetry [5]   Diagnosis: Acute encephalopathy [708076]   Admitting Physician: RU AGUILAR [[id number]]   Attending Physician: RU AGUILAR [[id number]]   Is patient appropriate for Inpatient Observation Unit?: No [0]

## 2025-02-15 NOTE — ED PROVIDER NOTES
EMERGENCY DEPARTMENT ENCOUNTER    Room Number:  11/11  PCP: Ava Sebastian MD  Historian: Patient, EMS    I initially evaluated the patient at 1716    HPI:  Chief Complaint: Fall  A complete HPI/ROS/PMH/PSH/SH/FH are unobtainable due to: Patient is somewhat confused  Context: Madelene Naegele is a 84 y.o. female with a medical history of CVA, hypertension, hyperlipidemia, CAD, atrial fibrillation, diabetes, chronic anticoagulation who presents to the ED via EMS c/o acute fall.  Patient fell earlier today but is not recall exactly what happened.  She denies hitting her head or losing consciousness.  She laid on the ground for several hours until a friend came and found her.  Patient was able to ambulate with the help of EMS.  EMS reports that the patient fell off the toilet.  She was in A-fib en route to the ED.  She takes Eliquis.  She denies headache, neck pain, chest pain, shortness of breath, abdominal pain, vomiting, diarrhea, or dysuria.  Patient lives alone.  She has reportedly had some trouble communicating since stroke 2 years ago.  Patient reports that her cat scratched her right hand several days ago.  This area is currently covered with Band-Aids            PAST MEDICAL HISTORY  Active Ambulatory Problems     Diagnosis Date Noted    Cellulitis of left leg 06/17/2024    Severe sepsis 06/17/2024    HTN (hypertension) 06/18/2024    Atrial fibrillation, persistent 06/18/2024    Hyperlipidemia 06/18/2024    History of stroke 06/18/2024    CAD (coronary artery disease) 06/18/2024    Type 2 diabetes mellitus, without long-term current use of insulin 06/18/2024     Resolved Ambulatory Problems     Diagnosis Date Noted    No Resolved Ambulatory Problems     Past Medical History:   Diagnosis Date    Hypertension     Persistent atrial fibrillation     Type 2 diabetes mellitus          PAST SURGICAL HISTORY  History reviewed. No pertinent surgical history.      FAMILY HISTORY  History reviewed. No pertinent  family history.      SOCIAL HISTORY  Social History     Socioeconomic History    Marital status: Single   Tobacco Use    Smoking status: Never     Passive exposure: Never    Smokeless tobacco: Never   Vaping Use    Vaping status: Never Used   Substance and Sexual Activity    Drug use: Never    Sexual activity: Not Currently         ALLERGIES  Patient has no known allergies.    REVIEW OF SYSTEMS  Review of Systems  Limited secondary to altered mental status    PHYSICAL EXAM  ED Triage Vitals [02/15/25 1657]   Temp Heart Rate Resp BP SpO2   98 °F (36.7 °C) 80 16 119/59 95 %      Temp src Heart Rate Source Patient Position BP Location FiO2 (%)   -- -- -- -- --       Physical Exam      GENERAL: Awake and alert.  Oriented only to self.  Frail-appearing elderly female..  She does not appear overtly toxic.    HENT: NCAT, nares patent, dry membranes  EYES: PERRL, EOMI  CV: Irregularly irregular rhythm, normal rate  RESPIRATORY: normal effort, clear to auscultation bilaterally  ABDOMEN: soft, nondistended, nontender  MUSCULOSKELETAL: Extremities are nontender with full range of motion.  C/T/L spine and chest are nontender  NEURO: Speech is nondysarthric.  She has intermittent aphasia.  No facial droop.  Follows commands.  Normal strength in all extremities.  PSYCH:  calm, cooperative  SKIN: There is a laceration on the dorsal aspect of the right hand with some minimal surrounding erythema.  No warmth, crepitus, drainage, or lymphangitis    Vital signs and nursing notes reviewed.          LAB RESULTS  Recent Results (from the past 24 hours)   ECG 12 Lead Altered Mental Status    Collection Time: 02/15/25  5:37 PM   Result Value Ref Range    QT Interval 343 ms    QTC Interval 427 ms   Comprehensive Metabolic Panel    Collection Time: 02/15/25  5:44 PM    Specimen: Blood   Result Value Ref Range    Glucose 198 (H) 65 - 99 mg/dL    BUN 27 (H) 8 - 23 mg/dL    Creatinine 1.38 (H) 0.57 - 1.00 mg/dL    Sodium 138 136 - 145 mmol/L     Potassium 4.4 3.5 - 5.2 mmol/L    Chloride 101 98 - 107 mmol/L    CO2 24.0 22.0 - 29.0 mmol/L    Calcium 9.3 8.6 - 10.5 mg/dL    Total Protein 6.9 6.0 - 8.5 g/dL    Albumin 4.0 3.5 - 5.2 g/dL    ALT (SGPT) 12 1 - 33 U/L    AST (SGOT) 16 1 - 32 U/L    Alkaline Phosphatase 62 39 - 117 U/L    Total Bilirubin 0.9 0.0 - 1.2 mg/dL    Globulin 2.9 gm/dL    A/G Ratio 1.4 g/dL    BUN/Creatinine Ratio 19.6 7.0 - 25.0    Anion Gap 13.0 5.0 - 15.0 mmol/L    eGFR 38.1 (L) >60.0 mL/min/1.73   Lactic Acid, Plasma    Collection Time: 02/15/25  5:44 PM    Specimen: Blood   Result Value Ref Range    Lactate 2.9 (C) 0.5 - 2.0 mmol/L   Valproic Acid Level, Total    Collection Time: 02/15/25  5:44 PM    Specimen: Blood   Result Value Ref Range    Valproic Acid 30.0 (L) 50.0 - 125.0 mcg/mL   CBC Auto Differential    Collection Time: 02/15/25  5:44 PM    Specimen: Blood   Result Value Ref Range    WBC 11.96 (H) 3.40 - 10.80 10*3/mm3    RBC 4.37 3.77 - 5.28 10*6/mm3    Hemoglobin 16.3 (H) 12.0 - 15.9 g/dL    Hematocrit 46.9 (H) 34.0 - 46.6 %    .3 (H) 79.0 - 97.0 fL    MCH 37.3 (H) 26.6 - 33.0 pg    MCHC 34.8 31.5 - 35.7 g/dL    RDW 13.4 12.3 - 15.4 %    RDW-SD 53.0 37.0 - 54.0 fl    MPV 10.2 6.0 - 12.0 fL    Platelets 170 140 - 450 10*3/mm3    nRBC 0.0 0.0 - 0.2 /100 WBC   High Sensitivity Troponin T    Collection Time: 02/15/25  5:44 PM    Specimen: Blood   Result Value Ref Range    HS Troponin T 11 <14 ng/L   CK    Collection Time: 02/15/25  5:44 PM    Specimen: Blood   Result Value Ref Range    Creatine Kinase 105 20 - 180 U/L   Magnesium    Collection Time: 02/15/25  5:44 PM    Specimen: Blood   Result Value Ref Range    Magnesium 1.8 1.6 - 2.4 mg/dL   Manual Differential    Collection Time: 02/15/25  5:44 PM    Specimen: Blood   Result Value Ref Range    Neutrophil % 82.0 (H) 42.7 - 76.0 %    Lymphocyte % 8.0 (L) 19.6 - 45.3 %    Monocyte % 9.0 5.0 - 12.0 %    Eosinophil % 1.0 0.3 - 6.2 %    Basophil % 0.0 0.0 - 1.5 %     Neutrophils Absolute 9.81 (H) 1.70 - 7.00 10*3/mm3    Lymphocytes Absolute 0.96 0.70 - 3.10 10*3/mm3    Monocytes Absolute 1.08 (H) 0.10 - 0.90 10*3/mm3    Eosinophils Absolute 0.12 0.00 - 0.40 10*3/mm3    Basophils Absolute 0.00 0.00 - 0.20 10*3/mm3    Anisocytosis Slight/1+ None Seen    Macrocytes Slight/1+ None Seen    WBC Morphology Normal Normal    Platelet Morphology Normal Normal   Urinalysis With Microscopic If Indicated (No Culture) - Straight Cath    Collection Time: 02/15/25  6:05 PM    Specimen: Straight Cath; Urine   Result Value Ref Range    Color, UA Yellow Yellow, Straw    Appearance, UA Clear Clear    pH, UA 6.0 5.0 - 8.0    Specific Gravity, UA 1.009 1.005 - 1.030    Glucose,  mg/dL (2+) (A) Negative    Ketones, UA Negative Negative    Bilirubin, UA Negative Negative    Blood, UA Negative Negative    Protein, UA Negative Negative    Leuk Esterase, UA Negative Negative    Nitrite, UA Negative Negative    Urobilinogen, UA 0.2 E.U./dL 0.2 - 1.0 E.U./dL   High Sensitivity Troponin T 1Hr    Collection Time: 02/15/25  7:38 PM    Specimen: Arm, Left; Blood   Result Value Ref Range    HS Troponin T 10 <14 ng/L    Troponin T Numeric Delta -1 Abnormal if >/=3 ng/L       Ordered the above labs and reviewed the results.        RADIOLOGY  CT Head Without Contrast    Result Date: 2/15/2025  CT HEAD WITHOUT CONTRAST  CLINICAL HISTORY: Fall, altered mental status  TECHNIQUE: CT scan of the head was obtained with 3 mm axial soft tissue and 2 mm axial bone algorithm algorithm images. No intravenous contrast was administered. Sagittal and coronal reconstructions were obtained.  COMPARISON: None.  FINDINGS:   There is no evidence for an acute extra-axial hemorrhage or a calvarial fracture.  The ventricles, sulci, and cisterns are generally age-appropriate. There is an age-indeterminate infarct within the lateral aspect of the left frontal lobe which measures up to approximately 2.2 x 1.0 cm in greatest axial  dimensions and is within the left MCA distribution. This could potentially be an acute to subacute infarct and further temporal characterization with an MRI of the brain is suggested.  There is a focus of encephalomalacia within the lateral aspect of the left temporal lobe extending into the left occipital lobe and the left inferior parietal lobule that is compatible with a chronic infarct within the left MCA distribution. It measures up to approximately 3.3 x 3.6 x 2.4 cm in greatest dimensions. Additionally, there are small chronic infarcts within the left cerebellar hemisphere that are within the left PICA distribution. These are all subcentimeter in size. An additional subcentimeter chronic infarct is identified within the lateral aspect of the right inferior parietal lobule and this is within the right MCA distribution. There are mild to moderate changes of chronic small vessel ischemic phenomenon. Old lacunar disease is seen within the right corona radiata.       No evidence for acute traumatic intracranial pathology.  However, there is an age-indeterminate infarct within the lateral aspect of the left frontal lobe within the left MCA distribution. Further temporal characterization with MR imaging is suggested.  Chronic supratentorial and infratentorial infarcts are identified as discussed in detail above. Mild to moderate changes of chronic small vessel ischemic phenomena and old lacunar disease are noted.  These findings and recommendations were discussed with Dr. Zelaya on 2/15/2025 at approximately 7:05 p.m.   Radiation dose reduction techniques were utilized, including automated exposure control and exposure modulation based on body size.  This report was finalized on 2/15/2025 7:05 PM by Dr. Ricky Meneses M.D on Workstation: [id number]       Ordered the above noted radiological studies. Reviewed by me in PACS.            PROCEDURES  Procedures        OUTPATIENT MEDICATION MANAGEMENT:  Current  Facility-Administered Medications Ordered in Epic   Medication Dose Route Frequency Provider Last Rate Last Admin    acetaminophen (TYLENOL) tablet 650 mg  650 mg Oral Q4H PRN Mackenzie Garza MD        Or    acetaminophen (TYLENOL) 160 MG/5ML oral solution 650 mg  650 mg Oral Q4H PRN Mackenzie Garza MD        Or    acetaminophen (TYLENOL) suppository 650 mg  650 mg Rectal Q4H PRN Mackenzie Garza MD        sennosides-docusate (PERICOLACE) 8.6-50 MG per tablet 2 tablet  2 tablet Oral BID PRN Greg, Mackenzie Palacios MD        And    polyethylene glycol (MIRALAX) packet 17 g  17 g Oral Daily PRN Mackenzie Garza MD        And    bisacodyl (DULCOLAX) EC tablet 5 mg  5 mg Oral Daily PRN Mackenzie Garza MD        And    bisacodyl (DULCOLAX) suppository 10 mg  10 mg Rectal Daily PRN Greg, Mackenzie Palacios MD        melatonin tablet 2.5 mg  2.5 mg Oral Nightly PRN Greg, Mackenzie Palacios MD        ondansetron ODT (ZOFRAN-ODT) disintegrating tablet 4 mg  4 mg Oral Q6H PRN Mackenzie Garza MD        Or    ondansetron (ZOFRAN) injection 4 mg  4 mg Intravenous Q6H PRMackenzie Boles MD        sodium chloride 0.9 % flush 10 mL  10 mL Intravenous PRN Joe Zelaya MD         Current Outpatient Medications Ordered in Epic   Medication Sig Dispense Refill    amoxicillin-clavulanate (AUGMENTIN) 875-125 MG per tablet Take 1 tablet by mouth 2 (Two) Times a Day. 14 tablet 0    apixaban (ELIQUIS) 5 MG tablet tablet Take 1 tablet by mouth 2 (Two) Times a Day. Indications: Atrial Fibrillation      aspirin 81 MG EC tablet Take 1 tablet by mouth Daily.      atorvastatin (LIPITOR) 40 MG tablet Take 1 tablet by mouth Daily. Indications: High Amount of Fats in the Blood      Cholecalciferol 25 MCG (1000 UT) tablet Take 2 tablets by mouth Daily. Indications: Vitamin D Deficiency      dilTIAZem CD (CARDIZEM CD) 240 MG 24 hr capsule Take 1 capsule by mouth 2 (Two) Times a Day. Indications: High  Blood Pressure      divalproex (DEPAKOTE) 125 MG DR tablet Take 1 tablet by mouth 2 (Two) Times a Day. Indications: Migraine Headache      fluorometholone (EFLONE) 0.1 % ophthalmic suspension Administer 1 drop to both eyes 4 (Four) Times a Day. PRN      gabapentin (NEURONTIN) 100 MG capsule Take 1 capsule by mouth 3 (Three) Times a Day. Indications: Diabetes with Nerve Disease      hydrOXYzine (ATARAX) 25 MG tablet Take 1 tablet by mouth 3 (Three) Times a Day As Needed for Itching. Indications: Feeling Anxious      lisinopril (PRINIVIL,ZESTRIL) 5 MG tablet Take 1 tablet by mouth Daily.      metFORMIN (GLUCOPHAGE) 500 MG tablet Take 1 tablet by mouth 2 (Two) Times a Day With Meals. Indications: Type 2 Diabetes      metoprolol succinate XL (TOPROL-XL) 50 MG 24 hr tablet Take 1 tablet by mouth Every 12 (Twelve) Hours for 30 days. 60 tablet 0    potassium chloride (K-DUR,KLOR-CON) 20 MEQ tablet controlled-release ER tablet Take 1 tablet by mouth Daily. Indications: High Blood Pressure      sertraline (ZOLOFT) 25 MG tablet Take 1 tablet by mouth Daily. Indications: Generalized Anxiety Disorder      torsemide (DEMADEX) 20 MG tablet Take 1 tablet by mouth Daily. Indications: Edema      vitamin C (ASCORBIC ACID) 250 MG tablet Take 2 tablets by mouth Daily.             MEDICATIONS GIVEN IN ER  Medications   sodium chloride 0.9 % flush 10 mL (has no administration in time range)   acetaminophen (TYLENOL) tablet 650 mg (has no administration in time range)     Or   acetaminophen (TYLENOL) 160 MG/5ML oral solution 650 mg (has no administration in time range)     Or   acetaminophen (TYLENOL) suppository 650 mg (has no administration in time range)   ondansetron ODT (ZOFRAN-ODT) disintegrating tablet 4 mg (has no administration in time range)     Or   ondansetron (ZOFRAN) injection 4 mg (has no administration in time range)   melatonin tablet 2.5 mg (has no administration in time range)   sennosides-docusate (PERICOLACE) 8.6-50  MG per tablet 2 tablet (has no administration in time range)     And   polyethylene glycol (MIRALAX) packet 17 g (has no administration in time range)     And   bisacodyl (DULCOLAX) EC tablet 5 mg (has no administration in time range)     And   bisacodyl (DULCOLAX) suppository 10 mg (has no administration in time range)                   MEDICAL DECISION MAKING, PROGRESS, and CONSULTS    All labs have been independently reviewed by me.  All radiology studies have been reviewed by me and I have also reviewed the radiology report.   EKG's independently viewed and interpreted by me.  Discussion below represents my analysis of pertinent findings related to patient's condition, differential diagnosis, treatment plan and final disposition.      Additional sources:    - Discussed/ obtained information from independent historians: EMS    - External (non-ED) record review: Patient was last admitted here in June 2024 for left leg cellulitis and sepsis.  She was treated with Zosyn and vancomycin.  MRI brain done in May 2022 showed small chronic infarcts in the bilateral cerebellum.    -Prescription drug monitoring program review:     N/A    - Chronic or social conditions impacting patient care (Social Determinants of Health): None          Orders placed during this visit:  Orders Placed This Encounter   Procedures    Blood Culture - Blood,    Blood Culture - Blood,    CT Head Without Contrast    Comprehensive Metabolic Panel    Urinalysis With Microscopic If Indicated (No Culture) - Urine, Catheter    Lactic Acid, Plasma    Valproic Acid Level, Total    CBC Auto Differential    High Sensitivity Troponin T    CK    Magnesium    Manual Differential    STAT Lactic Acid, Reflex    High Sensitivity Troponin T 1Hr    Basic Metabolic Panel    CBC (No Diff)    Diet: Cardiac; Healthy Heart (2-3 Na+); Fluid Consistency: Thin (IDDSI 0)    Monitor Blood Pressure    Continuous Pulse Oximetry    Irrigate wound    Vital Signs    Up with  assistance    Daily Weights    Strict Intake & Output    Oral Care    Place Sequential Compression Device    Maintain Sequential Compression Device    Code Status and Medical Interventions: CPR (Attempt to Resuscitate); Full    LHA (on-call MD unless specified) Details    ECG 12 Lead Altered Mental Status    Insert Peripheral IV    Initiate Observation Status    Initiate Observation Status    CBC & Differential         Additional orders considered but not ordered:          Differential diagnosis includes, but is not limited to:    Dehydration, electrolyte abnormality, UTI, closed head injury, intracranial hemorrhage      Independent interpretation of labs, radiology studies, and discussions with consultants:  ED Course as of 02/15/25 2058   Sat Feb 15, 2025   1715 BP: 110/70 [WH]   1715 Temp: 98 °F (36.7 °C) [WH]   1715 Heart Rate: 111 []   1715 SpO2: 97 % []   1743 EKG personally interpreted by me at 1740.  My personal interpretation is:         EKG time: 1737  Rhythm/Rate: Atrial fibrillation, rate 93  P waves and NV: Irregular, varying  QRS, axis: Normal axis, anteroseptal Q waves  ST and T waves: Nonspecific ST/T wave changes in the lateral and inferior leads    Interpreted Contemporaneously by me, independently viewed  EKG is not significantly changed compared to prior EKG done on 6/17/2024   []   1820 Leukocytes, UA: Negative []   1820 Nitrite, UA: Negative []   1820 WBC(!): 11.96 []   1820 Hemoglobin(!): 16.3 [WH]   1853 Magnesium: 1.8 [WH]   1853 Creatine Kinase: 105 []   1853 HS Troponin T: 11 []   1853 Valproic Acid(!): 30.0 []   1853 Glucose(!): 198 [WH]   1853 BUN(!): 27 [WH]   1853 Creatinine(!): 1.38  BUN 18, creatinine 1.07 seven months ago []   1854 Lactate(!!): 2.9 []   1854 Head CT personally interpreted by me.  My personal interpretation is: No intracranial hemorrhage.  No skull fracture. [WH]   1908 Head CT results discussed with Dr. Meneses, radiologist.  There is an infarct  in the left frontal lobe that may be acute versus subacute.  No intracranial hemorrhage.  MRI is recommended for further evaluation. [WH]   1920 Patient is resting comfortably.  She is still somewhat confused.  Test results, diagnoses, and plans for admission were discussed with her.  She is agreeable with this. [WH]   1927 Please discussed with Dr. Garza, hospitalist, and she agrees to admit the patient to a monitored bed.  Pertinent history, exam findings, test results, ED course, and diagnoses were discussed with her. [WH]   1940 MDM: Patient presented to the ED from home after a reported fall.  Patient did not recall exactly what happened but denied any injury.  Patient was somewhat confused.  Head CT was negative acute.  She was found to have mild acute kidney injury.  Lactic acid was also elevated likely due to dehydration.  She was given IV fluids.  She will be admitted to the hospitalist. [WH]      ED Course User Index  [WH] Joe Zelaya MD         COMPLEXITY OF CARE  The patient requires admission.      DIAGNOSIS  Final diagnoses:   Acute encephalopathy   Acute kidney injury   Abnormal head CT   Fall, initial encounter   Chronic anticoagulation   Laceration of right hand without foreign body, initial encounter         DISPOSITION  ADMISSION    Discussed treatment plan and reason for admission with pt/family and admitting physician.  Pt/family voiced understanding of the plan for admission for further testing/treatment as needed.               Latest Documented Vital Signs:  AS OF 20:58 EST VITALS:    BP - 101/77  HR - 100  TEMP - 98 °F (36.7 °C)  O2 SATS - 99%            --    Please note that portions of this were completed with a voice recognition program.       Note Disclaimer: At [facility], we believe that sharing information builds trust and better relationships. You are receiving this note because you are receiving care at [facility] or recently visited. It is possible you will see  health information before a provider has talked with you about it. This kind of information can be easy to misunderstand. To help you fully understand what it means for your health, we urge you to discuss this note with your provider.             Joe Zelaya MD  02/15/25 2059

## 2025-02-15 NOTE — ED NOTES
Pt has baseline asphgia from a stroke (unknown when)   Pt has a POA but no family local  Pt presents today for a fall off her toilet was unable to follow EMS instructions ; was unable to ambulate with help of ems personnel.   Pt is in afib upon arrival     Pt is Aox2 self, age but cannot tell me why she is here or what year it is.    Baseline pt lives alone and cares for herself

## 2025-02-16 PROBLEM — N18.30 CKD (CHRONIC KIDNEY DISEASE) STAGE 3, GFR 30-59 ML/MIN: Status: ACTIVE | Noted: 2025-02-16

## 2025-02-16 LAB
ANION GAP SERPL CALCULATED.3IONS-SCNC: 10 MMOL/L (ref 5–15)
BUN SERPL-MCNC: 24 MG/DL (ref 8–23)
BUN/CREAT SERPL: 20 (ref 7–25)
CALCIUM SPEC-SCNC: 8.9 MG/DL (ref 8.6–10.5)
CHLORIDE SERPL-SCNC: 103 MMOL/L (ref 98–107)
CO2 SERPL-SCNC: 27 MMOL/L (ref 22–29)
CREAT SERPL-MCNC: 1.2 MG/DL (ref 0.57–1)
DEPRECATED RDW RBC AUTO: 52.1 FL (ref 37–54)
EGFRCR SERPLBLD CKD-EPI 2021: 45 ML/MIN/1.73
ERYTHROCYTE [DISTWIDTH] IN BLOOD BY AUTOMATED COUNT: 13.3 % (ref 12.3–15.4)
FOLATE SERPL-MCNC: 15.6 NG/ML (ref 4.78–24.2)
GLUCOSE BLDC GLUCOMTR-MCNC: 143 MG/DL (ref 70–130)
GLUCOSE BLDC GLUCOMTR-MCNC: 148 MG/DL (ref 70–130)
GLUCOSE BLDC GLUCOMTR-MCNC: 199 MG/DL (ref 70–130)
GLUCOSE BLDC GLUCOMTR-MCNC: 254 MG/DL (ref 70–130)
GLUCOSE SERPL-MCNC: 158 MG/DL (ref 65–99)
HCT VFR BLD AUTO: 41.7 % (ref 34–46.6)
HGB BLD-MCNC: 14.8 G/DL (ref 12–15.9)
MCH RBC QN AUTO: 37.9 PG (ref 26.6–33)
MCHC RBC AUTO-ENTMCNC: 35.5 G/DL (ref 31.5–35.7)
MCV RBC AUTO: 106.9 FL (ref 79–97)
PLATELET # BLD AUTO: 143 10*3/MM3 (ref 140–450)
PMV BLD AUTO: 10.7 FL (ref 6–12)
POTASSIUM SERPL-SCNC: 4 MMOL/L (ref 3.5–5.2)
RBC # BLD AUTO: 3.9 10*6/MM3 (ref 3.77–5.28)
SODIUM SERPL-SCNC: 140 MMOL/L (ref 136–145)
VIT B12 BLD-MCNC: 510 PG/ML (ref 211–946)
WBC NRBC COR # BLD AUTO: 9.91 10*3/MM3 (ref 3.4–10.8)

## 2025-02-16 PROCEDURE — 82607 VITAMIN B-12: CPT | Performed by: HOSPITALIST

## 2025-02-16 PROCEDURE — 36415 COLL VENOUS BLD VENIPUNCTURE: CPT | Performed by: INTERNAL MEDICINE

## 2025-02-16 PROCEDURE — 63710000001 INSULIN LISPRO (HUMAN) PER 5 UNITS: Performed by: INTERNAL MEDICINE

## 2025-02-16 PROCEDURE — 25810000003 LACTATED RINGERS PER 1000 ML: Performed by: HOSPITALIST

## 2025-02-16 PROCEDURE — 97162 PT EVAL MOD COMPLEX 30 MIN: CPT

## 2025-02-16 PROCEDURE — 80048 BASIC METABOLIC PNL TOTAL CA: CPT | Performed by: INTERNAL MEDICINE

## 2025-02-16 PROCEDURE — 97535 SELF CARE MNGMENT TRAINING: CPT

## 2025-02-16 PROCEDURE — 97166 OT EVAL MOD COMPLEX 45 MIN: CPT

## 2025-02-16 PROCEDURE — 82746 ASSAY OF FOLIC ACID SERUM: CPT | Performed by: HOSPITALIST

## 2025-02-16 PROCEDURE — 97530 THERAPEUTIC ACTIVITIES: CPT

## 2025-02-16 PROCEDURE — 82948 REAGENT STRIP/BLOOD GLUCOSE: CPT

## 2025-02-16 PROCEDURE — 85027 COMPLETE CBC AUTOMATED: CPT | Performed by: INTERNAL MEDICINE

## 2025-02-16 RX ORDER — SODIUM CHLORIDE, SODIUM LACTATE, POTASSIUM CHLORIDE, CALCIUM CHLORIDE 600; 310; 30; 20 MG/100ML; MG/100ML; MG/100ML; MG/100ML
75 INJECTION, SOLUTION INTRAVENOUS CONTINUOUS
Status: ACTIVE | OUTPATIENT
Start: 2025-02-16 | End: 2025-02-17

## 2025-02-16 RX ADMIN — APIXABAN 5 MG: 5 TABLET, FILM COATED ORAL at 12:05

## 2025-02-16 RX ADMIN — SERTRALINE HYDROCHLORIDE 25 MG: 50 TABLET, FILM COATED ORAL at 08:43

## 2025-02-16 RX ADMIN — ASPIRIN 81 MG: 81 TABLET, COATED ORAL at 08:44

## 2025-02-16 RX ADMIN — INSULIN LISPRO 2 UNITS: 100 INJECTION, SOLUTION INTRAVENOUS; SUBCUTANEOUS at 21:09

## 2025-02-16 RX ADMIN — Medication 2.5 MG: at 21:09

## 2025-02-16 RX ADMIN — GABAPENTIN 100 MG: 100 CAPSULE ORAL at 12:05

## 2025-02-16 RX ADMIN — DIVALPROEX SODIUM 125 MG: 125 TABLET, DELAYED RELEASE ORAL at 21:04

## 2025-02-16 RX ADMIN — INSULIN LISPRO 4 UNITS: 100 INJECTION, SOLUTION INTRAVENOUS; SUBCUTANEOUS at 12:05

## 2025-02-16 RX ADMIN — ATORVASTATIN CALCIUM 40 MG: 20 TABLET, FILM COATED ORAL at 08:44

## 2025-02-16 RX ADMIN — METOPROLOL SUCCINATE 50 MG: 25 TABLET, EXTENDED RELEASE ORAL at 08:44

## 2025-02-16 RX ADMIN — DILTIAZEM HYDROCHLORIDE 240 MG: 120 CAPSULE, EXTENDED RELEASE ORAL at 08:44

## 2025-02-16 RX ADMIN — GABAPENTIN 100 MG: 100 CAPSULE ORAL at 21:04

## 2025-02-16 RX ADMIN — APIXABAN 5 MG: 5 TABLET, FILM COATED ORAL at 21:04

## 2025-02-16 RX ADMIN — DIVALPROEX SODIUM 125 MG: 125 TABLET, DELAYED RELEASE ORAL at 12:05

## 2025-02-16 RX ADMIN — SODIUM CHLORIDE, POTASSIUM CHLORIDE, SODIUM LACTATE AND CALCIUM CHLORIDE 75 ML/HR: 600; 310; 30; 20 INJECTION, SOLUTION INTRAVENOUS at 12:05

## 2025-02-16 RX ADMIN — Medication 2000 UNITS: at 08:44

## 2025-02-16 RX ADMIN — POTASSIUM CHLORIDE 40 MEQ: 1500 TABLET, EXTENDED RELEASE ORAL at 08:44

## 2025-02-16 NOTE — PLAN OF CARE
Goal Outcome Evaluation:  Plan of Care Reviewed With: patient           Outcome Evaluation: Patient is an 84 year oold female in obs at [facility] with acute encephalopathy. She also has underlying expressive aphasia from chronic stroke. Patient reports that she lives alone, and independent with self care and household IADLs, denies using AD for mobility, but has a cane and walker. Today, patient performed bed mobility with min A, requires verbal cues and encouragement for participation. Patient able to sit EOB unsupported for self feeding and grooming/hygiene, but declines to stand this date, due to fatigue. She requries mod A to return to supine and for positioning. Patient will continue to benefit from skilled OT to address current functional deficits, recommend home with HH and good caregiver support vs SNF pending progress.    Anticipated Discharge Disposition (OT): home, home with 24/7 care, home with home health, skilled nursing facility

## 2025-02-16 NOTE — H&P
HISTORY AND PHYSICAL   [facility]        Date of Admission: 2/15/2025  Patient Identification:  Name: Madelene Naegele  Age: 84 y.o.  Sex: female  :  1940  MRN: [id number]                     Primary Care Physician: Ava Sebastian MD    Chief Complaint:  84 year old female presented to the emergency room after a fall at home; she is not able to recall what happened but this was reported by her caregiver; she had been on the ground for a while before she was found; she ddneis pain; ems was called and she was able to walk; she is not able to give a good history; she was noted to be confused in the ED    History of Present Illness:   As above    Past Medical History:  Past Medical History:   Diagnosis Date    CAD (coronary artery disease)     History of stroke     Hyperlipidemia     Hypertension     Persistent atrial fibrillation     Type 2 diabetes mellitus      Past Surgical History:  History reviewed. No pertinent surgical history.   Home Meds:  Medications Prior to Admission   Medication Sig Dispense Refill Last Dose/Taking    apixaban (ELIQUIS) 5 MG tablet tablet Take 1 tablet by mouth 2 (Two) Times a Day. Indications: Atrial Fibrillation   Taking    aspirin 81 MG EC tablet Take 1 tablet by mouth Daily.   Taking    atorvastatin (LIPITOR) 40 MG tablet Take 1 tablet by mouth Daily. Indications: High Amount of Fats in the Blood   Taking    Cholecalciferol 25 MCG (1000 UT) tablet Take 2 tablets by mouth Daily. Indications: Vitamin D Deficiency   Taking    dilTIAZem (Tiadylt ER) 240 MG 24 hr capsule Take 1 capsule by mouth Daily.   Taking    divalproex (DEPAKOTE) 125 MG DR tablet Take 1 tablet by mouth 2 (Two) Times a Day. Indications: Migraine Headache   Taking    fluorometholone (EFLONE) 0.1 % ophthalmic suspension Administer 1 drop to both eyes 4 (Four) Times a Day. PRN   Taking    gabapentin (NEURONTIN) 100 MG capsule Take 1 capsule by mouth 2 (Two) Times a Day. Indications: Diabetes with  Nerve Disease   Taking    hydrOXYzine (ATARAX) 25 MG tablet Take 1 tablet by mouth Every 6 (Six) Hours As Needed for Itching. Indications: Feeling Anxious   Taking As Needed    metFORMIN (GLUCOPHAGE) 500 MG tablet Take 1 tablet by mouth 2 (Two) Times a Day With Meals. Indications: Type 2 Diabetes   Taking    potassium chloride (K-DUR,KLOR-CON) 20 MEQ tablet controlled-release ER tablet Take 2 tablets by mouth Daily. Indications: High Blood Pressure   Taking    sertraline (ZOLOFT) 25 MG tablet Take 1 tablet by mouth Daily. Indications: Generalized Anxiety Disorder   Taking    torsemide (DEMADEX) 20 MG tablet Take 1 tablet by mouth Daily. Indications: Edema   Taking    amoxicillin-clavulanate (AUGMENTIN) 875-125 MG per tablet Take 1 tablet by mouth 2 (Two) Times a Day. 14 tablet 0     dilTIAZem CD (CARDIZEM CD) 240 MG 24 hr capsule Take 1 capsule by mouth 2 (Two) Times a Day. Indications: High Blood Pressure       lisinopril (PRINIVIL,ZESTRIL) 5 MG tablet Take 1 tablet by mouth Daily.       metoprolol succinate XL (TOPROL-XL) 50 MG 24 hr tablet Take 1 tablet by mouth Every 12 (Twelve) Hours for 30 days. (Patient taking differently: Take 1 tablet by mouth Daily. Indications: High Blood Pressure) 60 tablet 0     vitamin C (ASCORBIC ACID) 250 MG tablet Take 2 tablets by mouth Daily.          Allergies:  No Known Allergies  Immunizations:  Immunization History   Administered Date(s) Administered    COVID-19 (PFIZER) 12YRS+ (COMIRNATY) 05/23/2024    COVID-19 (PFIZER) Purple Cap Monovalent 02/12/2021, 03/05/2021, 10/17/2021    Tdap 06/17/2024     Social History:   Social History     Social History Narrative    Not on file     Social History     Socioeconomic History    Marital status: Single   Tobacco Use    Smoking status: Never     Passive exposure: Never    Smokeless tobacco: Never   Vaping Use    Vaping status: Never Used   Substance and Sexual Activity    Drug use: Never    Sexual activity: Not Currently       Family  "History:  History reviewed. No pertinent family history.     Review of Systems  Not obtainable from the patient    Objective:  T Max 24 hrs: Temp (24hrs), Av.1 °F (36.7 °C), Min:97.7 °F (36.5 °C), Max:98.5 °F (36.9 °C)    Vitals Ranges:   Temp:  [97.7 °F (36.5 °C)-98.5 °F (36.9 °C)] 98.5 °F (36.9 °C)  Heart Rate:  [] 83  Resp:  [16-22] 18  BP: (101-119)/(56-77) 105/63      Exam:  /63 (BP Location: Left arm, Patient Position: Lying)   Pulse 83   Temp 98.5 °F (36.9 °C) (Oral)   Resp 18   Ht 157.5 cm (62\")   Wt 67.7 kg (149 lb 4 oz)   SpO2 98%   BMI 27.30 kg/m²     General Appearance:    Alert, cooperative, no distress, appears stated age   Head:    Normocephalic, without obvious abnormality, atraumatic   Eyes:    PERRL, conjunctivae/corneas clear, EOM's intact, both eyes   Ears:    Normal external ear canals, both ears   Nose:   Nares normal, septum midline, mucosa normal, no drainage    or sinus tenderness   Throat:   Lips, mucosa, and tongue normal   Neck:   Supple, symmetrical, trachea midline, no adenopathy;     thyroid:  no enlargement/tenderness/nodules; no carotid    bruit or JVD   Back:     Symmetric, no curvature, ROM normal, no CVA tenderness   Lungs:     Decreased breath sounds bilaterally, respirations unlabored   Chest Wall:    No tenderness or deformity    Heart:    Regular rate and rhythm, S1 and S2 normal, no murmur, rub   or gallop   Abdomen:     Soft, nontender, bowel sounds active all four quadrants,     no masses, no hepatomegaly, no splenomegaly   Extremities:   Extremities normal, atraumatic, no cyanosis or edema                       .    Data Review:  Labs in chart were reviewed.  WBC   Date Value Ref Range Status   02/15/2025 11.96 (H) 3.40 - 10.80 10*3/mm3 Final     Hemoglobin   Date Value Ref Range Status   02/15/2025 16.3 (H) 12.0 - 15.9 g/dL Final     Hematocrit   Date Value Ref Range Status   02/15/2025 46.9 (H) 34.0 - 46.6 % Final     Platelets   Date Value Ref " Range Status   02/15/2025 170 140 - 450 10*3/mm3 Final     Sodium   Date Value Ref Range Status   02/15/2025 138 136 - 145 mmol/L Final     Potassium   Date Value Ref Range Status   02/15/2025 4.4 3.5 - 5.2 mmol/L Final     Comment:     Slight hemolysis detected by analyzer. Result may be falsely elevated.     Chloride   Date Value Ref Range Status   02/15/2025 101 98 - 107 mmol/L Final     CO2   Date Value Ref Range Status   02/15/2025 24.0 22.0 - 29.0 mmol/L Final     BUN   Date Value Ref Range Status   02/15/2025 27 (H) 8 - 23 mg/dL Final     Creatinine   Date Value Ref Range Status   02/15/2025 1.38 (H) 0.57 - 1.00 mg/dL Final     Glucose   Date Value Ref Range Status   02/15/2025 198 (H) 65 - 99 mg/dL Final     Calcium   Date Value Ref Range Status   02/15/2025 9.3 8.6 - 10.5 mg/dL Final     Magnesium   Date Value Ref Range Status   02/15/2025 1.8 1.6 - 2.4 mg/dL Final     AST (SGOT)   Date Value Ref Range Status   02/15/2025 16 1 - 32 U/L Final     ALT (SGPT)   Date Value Ref Range Status   02/15/2025 12 1 - 33 U/L Final     Alkaline Phosphatase   Date Value Ref Range Status   02/15/2025 62 39 - 117 U/L Final                Imaging Results (All)       Procedure Component Value Units Date/Time    CT Head Without Contrast [[id number]] Collected: 02/15/25 1855     Updated: 02/15/25 1908    Narrative:      CT HEAD WITHOUT CONTRAST     CLINICAL HISTORY: Fall, altered mental status     TECHNIQUE: CT scan of the head was obtained with 3 mm axial soft tissue  and 2 mm axial bone algorithm algorithm images. No intravenous contrast  was administered. Sagittal and coronal reconstructions were obtained.     COMPARISON: None.     FINDINGS:       There is no evidence for an acute extra-axial hemorrhage or a calvarial  fracture.     The ventricles, sulci, and cisterns are generally age-appropriate. There  is an age-indeterminate infarct within the lateral aspect of the left  frontal lobe which measures up to approximately 2.2  x 1.0 cm in greatest  axial dimensions and is within the left MCA distribution. This could  potentially be an acute to subacute infarct and further temporal  characterization with an MRI of the brain is suggested.     There is a focus of encephalomalacia within the lateral aspect of the  left temporal lobe extending into the left occipital lobe and the left  inferior parietal lobule that is compatible with a chronic infarct  within the left MCA distribution. It measures up to approximately 3.3 x  3.6 x 2.4 cm in greatest dimensions. Additionally, there are small  chronic infarcts within the left cerebellar hemisphere that are within  the left PICA distribution. These are all subcentimeter in size. An  additional subcentimeter chronic infarct is identified within the  lateral aspect of the right inferior parietal lobule and this is within  the right MCA distribution. There are mild to moderate changes of  chronic small vessel ischemic phenomenon. Old lacunar disease is seen  within the right corona radiata.       Impression:         No evidence for acute traumatic intracranial pathology.     However, there is an age-indeterminate infarct within the lateral aspect  of the left frontal lobe within the left MCA distribution. Further  temporal characterization with MR imaging is suggested.     Chronic supratentorial and infratentorial infarcts are identified as  discussed in detail above. Mild to moderate changes of chronic small  vessel ischemic phenomena and old lacunar disease are noted.     These findings and recommendations were discussed with Dr. Zelaya on  2/15/2025 at approximately 7:05 p.m.        Radiation dose reduction techniques were utilized, including automated  exposure control and exposure modulation based on body size.     This report was finalized on 2/15/2025 7:05 PM by Dr. Ricky Meneses M.D  on Workstation: [id number]                 Assessment:  Active Hospital Problems    Diagnosis  POA     **Acute encephalopathy [G93.40]  Yes    Altered mental status [R41.82]  Yes      Resolved Hospital Problems   No resolved problems to display.   Hypertension  A fib  Diabetes  Cad  Ckd3      Plan:  Continue fluids  Pt.ot to see  Accu checks, sliding scale  Trend labs  Dw patient and family as well as ed provider  Patient is full code and friend sera is hcs    Mackenzie Garza MD  2/15/2025  23:14 EST

## 2025-02-16 NOTE — PLAN OF CARE
Goal Outcome Evaluation:         Pt has word finding from previous CVA.  No complications, MRI paperwork filled out.  BG controlled

## 2025-02-16 NOTE — PLAN OF CARE
Goal Outcome Evaluation:            Pt from home. A&o x2. Reoriented. Turns self.

## 2025-02-16 NOTE — THERAPY EVALUATION
Patient Name: Madelene Naegele  : 1940    MRN: [id number]                              Today's Date: 2025       Admit Date: 2/15/2025    Visit Dx:     ICD-10-CM ICD-9-CM   1. Acute encephalopathy  G93.40 348.30   2. Acute kidney injury  N17.9 584.9   3. Abnormal head CT  R93.0 793.0   4. Fall, initial encounter  W19.XXXA E888.9   5. Chronic anticoagulation  Z79.01 V58.61   6. Laceration of right hand without foreign body, initial encounter  S61.411A 882.0     Patient Active Problem List   Diagnosis    Cellulitis of left leg    Severe sepsis    HTN (hypertension)    Atrial fibrillation, persistent    Hyperlipidemia    History of stroke    CAD (coronary artery disease)    Type 2 diabetes mellitus, without long-term current use of insulin    Acute encephalopathy    Altered mental status     Past Medical History:   Diagnosis Date    CAD (coronary artery disease)     History of stroke     Hyperlipidemia     Hypertension     Persistent atrial fibrillation     Type 2 diabetes mellitus      History reviewed. No pertinent surgical history.   General Information       Row Name 25 0917          OT Time and Intention    Document Type evaluation  -     Mode of Treatment individual therapy;occupational therapy  -     Patient Effort good  -       Row Name 25 0917          General Information    Patient Profile Reviewed yes  -     Prior Level of Function independent:  has cane and walker but does not use at home  -     Existing Precautions/Restrictions fall  Hx of expressive aphasia  -     Barriers to Rehab medically complex;previous functional deficit  -       Row Name 25 0917          Living Environment    People in Home alone  -       Row Name 25 0917          Cognition    Orientation Status (Cognition) oriented to;person;place;situation  -       Row Name 25 0917          Safety Issues/Impairments Affecting Functional Mobility    Safety Issues Affecting Function  (Mobility) problem-solving  -     Impairments Affecting Function (Mobility) endurance/activity tolerance;balance  -               User Key  (r) = Recorded By, (t) = Taken By, (c) = Cosigned By      Initials Name Provider Type     Daja Seo OT Occupational Therapist                     Mobility/ADL's       Row Name 02/16/25 0925          Bed Mobility    Bed Mobility bed mobility (all) activities  -     All Activities, Ward (Bed Mobility) minimum assist (75% patient effort)  -[facility] Name 02/16/25 0925          Transfers    Transfers sit-stand transfer  -     Comment, (Transfers) Not attempted, patient able to sit at EOB, declined to attempt standing at OT eval  -[facility] Name 02/16/25 0925          Activities of Daily Living    BADL Assessment/Intervention grooming;feeding  -[facility] Name 02/16/25 0925          Grooming Assessment/Training    Ward Level (Grooming) grooming skills;wash face, hands;standby assist  -     Position (Grooming) edge of bed sitting  -[facility] Name 02/16/25 0925          Self-Feeding Assessment/Training    Ward Level (Feeding) feeding skills;liquids to mouth;scoop food and bring to mouth;set up  -     Position (Feeding) edge of bed sitting  -               User Key  (r) = Recorded By, (t) = Taken By, (c) = Cosigned By      Initials Name Provider Type     Daja Seo OT Occupational Therapist                   Obj/Interventions       Row Name 02/16/25 1107          Sensory Assessment (Somatosensory)    Sensory Assessment (Somatosensory) sensation intact  -[facility] Name 02/16/25 1107          Vision Assessment/Intervention    Visual Impairment/Limitations WFL  -       Row Name 02/16/25 1107          Balance    Balance Assessment sitting static balance;sitting dynamic balance  -     Static Sitting Balance standby assist  -     Dynamic Sitting Balance standby assist  -     Position, Sitting Balance unsupported;sitting  edge of bed  -     Balance Interventions sitting;occupation based/functional task  -               User Key  (r) = Recorded By, (t) = Taken By, (c) = Cosigned By      Initials Name Provider Type     Daja Seo OT Occupational Therapist                   Goals/Plan       [facility] Name 02/16/25 1141          Bed Mobility Goal 1 (OT)    Activity/Assistive Device (Bed Mobility Goal 1, OT) bed mobility activities, all  -     Leon Level/Cues Needed (Bed Mobility Goal 1, OT) modified independence  -     Time Frame (Bed Mobility Goal 1, OT) short term goal (STG);2 weeks  -[facility] Name 02/16/25 1141          Transfer Goal 1 (OT)    Activity/Assistive Device (Transfer Goal 1, OT) transfers, all  -     Leon Level/Cues Needed (Transfer Goal 1, OT) modified independence  -     Time Frame (Transfer Goal 1, OT) short term goal (STG);2 weeks  -     Progress/Outcome (Transfer Goal 1, OT) new goal  -[facility] Name 02/16/25 1141          Dressing Goal 1 (OT)    Activity/Device (Dressing Goal 1, OT) dressing skills, all;upper body dressing;lower body dressing  -     Leon/Cues Needed (Dressing Goal 1, OT) modified independence  [facility]     Time Frame (Dressing Goal 1, OT) short term goal (STG);2 weeks  -     Progress/Outcome (Dressing Goal 1, OT) new goal  -LH       Row Name 02/16/25 1141          Toileting Goal 1 (OT)    Activity/Device (Toileting Goal 1, OT) toileting skills, all  -     Leon Level/Cues Needed (Toileting Goal 1, OT) modified independence  [facility]     Time Frame (Toileting Goal 1, OT) short term goal (STG);2 weeks  -     Progress/Outcome (Toileting Goal 1, OT) new goal  -       Row Name 02/16/25 1141          Grooming Goal 1 (OT)    Activity/Device (Grooming Goal 1, OT) grooming skills, all  -     Leon (Grooming Goal 1, OT) modified independence  [facility]     Time Frame (Grooming Goal 1, OT) short term goal (STG);2 weeks  -     Progress/Outcome (Grooming  Goal 1, OT) new goal  -[facility] Name 02/16/25 1141          Therapy Assessment/Plan (OT)    Planned Therapy Interventions (OT) activity tolerance training;BADL retraining;functional balance retraining;occupation/activity based interventions;patient/caregiver education/training;strengthening exercise;transfer/mobility retraining  -               User Key  (r) = Recorded By, (t) = Taken By, (c) = Cosigned By      Initials Name Provider Type     Daja Seo, OT Occupational Therapist                   Clinical Impression       [facility] Name 02/16/25 1107          Pain Assessment    Pretreatment Pain Rating 0/10 - no pain  -     Posttreatment Pain Rating 0/10 - no pain  -[facility] Name 02/16/25 1107          Plan of Care Review    Plan of Care Reviewed With patient  -     Outcome Evaluation Patient is an 84 year oold female in obs at [facility] with acute encephalopathy. She also has underlying expressive aphasia from chronic stroke. Patient reports that she lives alone, and independent with self care and household IADLs, denies using AD for mobility, but has a cane and walker. Today, patient performed bed mobility with min A, requires verbal cues and encouragement for participation. Patient able to sit EOB unsupported for self feeding and grooming/hygiene, but declines to stand this date, due to fatigue. She requries mod A to return to supine and for positioning. Patient will continue to benefit from skilled OT to address current functional deficits, recommend home with HH and good caregiver support vs SNF pending progress.  -[facility] Name 02/16/25 1107          Therapy Assessment/Plan (OT)    Rehab Potential (OT) good  -     Criteria for Skilled Therapeutic Interventions Met (OT) yes;meets criteria  -     Therapy Frequency (OT) 3 times/wk  -[facility] Name 02/16/25 1107          Therapy Plan Review/Discharge Plan (OT)    Anticipated Discharge Disposition (OT) home;home with 24/7 care;home with home  health;skilled nursing facility  -       Row Name 02/16/25 1107          Positioning and Restraints    Pre-Treatment Position in bed  -     Post Treatment Position bed  -     In Bed fowlers;encouraged to call for assist;exit alarm on;call light within reach  -               User Key  (r) = Recorded By, (t) = Taken By, (c) = Cosigned By      Initials Name Provider Type     Daja Seo, RIAN Occupational Therapist                   Outcome Measures       Row Name 02/16/25 1141          How much help from another is currently needed...    Putting on and taking off regular lower body clothing? 2  -LH     Bathing (including washing, rinsing, and drying) 2  -LH     Toileting (which includes using toilet bed pan or urinal) 3  -LH     Putting on and taking off regular upper body clothing 3  -LH     Taking care of personal grooming (such as brushing teeth) 4  -LH     Eating meals 4  -     AM-PAC 6 Clicks Score (OT) 18  -[facility] Name 02/16/25 0805          How much help from another person do you currently need...    Turning from your back to your side while in flat bed without using bedrails? 3  -BR     Moving from lying on back to sitting on the side of a flat bed without bedrails? 3  -BR     Moving to and from a bed to a chair (including a wheelchair)? 3  -BR     Standing up from a chair using your arms (e.g., wheelchair, bedside chair)? 3  -BR     Climbing 3-5 steps with a railing? 2  -BR     To walk in hospital room? 2  -BR     AM-PAC 6 Clicks Score (PT) 16  -BR     Highest Level of Mobility Goal 5 --> Static standing  -BR       Row Name 02/16/25 1141          Modified Bj Scale    Pre-Stroke Modified [place] Scale 2 - Slight disability.  Unable to carry out all previous activities but able to look after own affairs without assistance.  -       Row Name 02/16/25 1141          Functional Assessment    Outcome Measure Options AM-PAC 6 Clicks Daily Activity (OT);Modified Dalbo  -               User  Key  (r) = Recorded By, (t) = Taken By, (c) = Cosigned By      Initials Name Provider Type    Mathew Cano, RN Registered Nurse     Daja Seo OT Occupational Therapist                    Occupational Therapy Education       Title: PT OT SLP Therapies (In Progress)       Topic: Occupational Therapy (Done)       Point: ADL training (Done)       Description:   Instruct learner(s) on proper safety adaptation and remediation techniques during self care or transfers.   Instruct in proper use of assistive devices.                  Learning Progress Summary            Patient Acceptance, E,TB, VU by  at 2/16/2025 1142    Comment: Instructed in role of OT, discharge planing, home safety, fall prevention, use of call light                      Point: Home exercise program (Done)       Description:   Instruct learner(s) on appropriate technique for monitoring, assisting and/or progressing therapeutic exercises/activities.                  Learning Progress Summary            Patient Acceptance, E,TB, VU by  at 2/16/2025 1142    Comment: Instructed in role of OT, discharge planing, home safety, fall prevention, use of call light                      Point: Precautions (Done)       Description:   Instruct learner(s) on prescribed precautions during self-care and functional transfers.                  Learning Progress Summary            Patient Acceptance, E,TB, VU by  at 2/16/2025 1142    Comment: Instructed in role of OT, discharge planing, home safety, fall prevention, use of call light                      Point: Body mechanics (Done)       Description:   Instruct learner(s) on proper positioning and spine alignment during self-care, functional mobility activities and/or exercises.                  Learning Progress Summary            Patient Acceptance, E,TB, VU by  at 2/16/2025 1142    Comment: Instructed in role of OT, discharge planing, home safety, fall prevention, use of call light                                       User Key       Initials Effective Dates Name Provider Type Discipline     08/31/23 -  Daja Seo, RIAN Occupational Therapist OT                  OT Recommendation and Plan  Planned Therapy Interventions (OT): activity tolerance training, BADL retraining, functional balance retraining, occupation/activity based interventions, patient/caregiver education/training, strengthening exercise, transfer/mobility retraining  Therapy Frequency (OT): 3 times/wk  Plan of Care Review  Plan of Care Reviewed With: patient  Outcome Evaluation: Patient is an 84 year oold female in obs at [facility] with acute encephalopathy. She also has underlying expressive aphasia from chronic stroke. Patient reports that she lives alone, and independent with self care and household IADLs, denies using AD for mobility, but has a cane and walker. Today, patient performed bed mobility with min A, requires verbal cues and encouragement for participation. Patient able to sit EOB unsupported for self feeding and grooming/hygiene, but declines to stand this date, due to fatigue. She requries mod A to return to supine and for positioning. Patient will continue to benefit from skilled OT to address current functional deficits, recommend home with HH and good caregiver support vs SNF pending progress.     Time Calculation:   Evaluation Complexity (OT)  Review Occupational Profile/Medical/Therapy History Complexity: expanded/moderate complexity  Assessment, Occupational Performance/Identification of Deficit Complexity: 3-5 performance deficits  Clinical Decision Making Complexity (OT): detailed assessment/moderate complexity  Overall Complexity of Evaluation (OT): moderate complexity     Time Calculation- OT       Row Name 02/16/25 1143             Time Calculation- OT    OT Start Time 0910  -      OT Stop Time 0929  -      OT Time Calculation (min) 19 min  -      Total Timed Code Minutes- OT 10 minute(s)  -      OT Non-Billable  Time (min) 9 min  -      OT Received On 02/16/25  -      OT - Next Appointment 02/17/25  -      OT Goal Re-Cert Due Date 03/02/25  -         Timed Charges    19293 - OT Self Care/Mgmt Minutes 10  -LH         Untimed Charges    OT Eval/Re-eval Minutes 9  -LH         Total Minutes    Timed Charges Total Minutes 10  -LH      Untimed Charges Total Minutes 9  -LH       Total Minutes 19  -LH                User Key  (r) = Recorded By, (t) = Taken By, (c) = Cosigned By      Initials Name Provider Type     Daja Seo, OT Occupational Therapist                  Therapy Charges for Today       Code Description Service Date Service Provider Modifiers Qty    36602616535 HC OT SELF CARE/MGMT/TRAIN EA 15 MIN 2/16/2025 Daja Seo OT GO 1    97762406189 HC OT EVAL MOD COMPLEXITY 3 2/16/2025 Daja Seo OT GO 1                 Daja Seo OT  2/16/2025

## 2025-02-16 NOTE — PLAN OF CARE
Goal Outcome Evaluation:              Outcome Evaluation: Pt is a 84 y.o. female admitted to [facility] with c/o fall on 2/15/2025. Work up reveals acute encephalopathy. Pt presents today with generalized weakness, and decreased functional mobility. Pt required min A for bed mobility, min A for STS transfers, and CGA for ambulation with FWW for side steps to HOB. Pt will benefit from skilled PT to address the previous deficits and improve overall safety with functional mobility. Prior to admission pt lives alone. Recommend SNF at d/c at this time..    Anticipated Discharge Disposition (PT): skilled nursing facility

## 2025-02-16 NOTE — ED NOTES
Nursing report ED to floor  Madelene Naegele  84 y.o.  female    HPI :  HPI  Stated Reason for Visit: fall, hit head, denies loc  History Obtained From: patient    Chief Complaint  Chief Complaint   Patient presents with    Fall    Head Injury     Fall- head injury is on eliquis -has had a stroke 2 years ago and has trouble communicating at times from that stroke        Admitting doctor:   Mackenzie Garza MD    Admitting diagnosis:   The primary encounter diagnosis was Acute encephalopathy. Diagnoses of Acute kidney injury, Abnormal head CT, Fall, initial encounter, Chronic anticoagulation, and Laceration of right hand without foreign body, initial encounter were also pertinent to this visit.    Code status:   Current Code Status       Date Active Code Status Order ID Comments User Context       2/15/2025 1929 CPR (Attempt to Resuscitate) [id number]  Mackenzie Garza MD ED        Question Answer    Code Status (Patient has no pulse and is not breathing) CPR (Attempt to Resuscitate)    Medical Interventions (Patient has pulse or is breathing) Full                    Allergies:   Patient has no known allergies.    Isolation:   No active isolations    Intake and Output  No intake or output data in the 24 hours ending 02/15/25 2106    Weight:       02/15/25  1658   Weight: 68 kg (150 lb)       Most recent vitals:   Vitals:    02/15/25 1746 02/15/25 1816 02/15/25 1817 02/15/25 2101   BP: 111/74 101/77  104/56   Patient Position: Lying Lying     Pulse: 113 105 100 83   Resp:       Temp:       SpO2: 98%  99% 98%   Weight:       Height:           Active LDAs/IV Access:   Lines, Drains & Airways       Active LDAs       Name Placement date Placement time Site Days    Peripheral IV 02/15/25 1744 Anterior;Right Forearm 02/15/25  1744  Forearm  less than 1                    Labs (abnormal labs have a star):   Labs Reviewed   COMPREHENSIVE METABOLIC PANEL - Abnormal; Notable for the following components:       Result  Value    Glucose 198 (*)     BUN 27 (*)     Creatinine 1.38 (*)     eGFR 38.1 (*)     All other components within normal limits    Narrative:     GFR Categories in Chronic Kidney Disease (CKD)      GFR Category          GFR (mL/min/1.73)    Interpretation  G1                     90 or greater         Normal or high (1)  G2                      60-89                Mild decrease (1)  G3a                   45-59                Mild to moderate decrease  G3b                   30-44                Moderate to severe decrease  G4                    15-29                Severe decrease  G5                    14 or less           Kidney failure          (1)In the absence of evidence of kidney disease, neither GFR category G1 or G2 fulfill the criteria for CKD.    eGFR calculation 2021 CKD-EPI creatinine equation, which does not include race as a factor   URINALYSIS W/ MICROSCOPIC IF INDICATED (NO CULTURE) - Abnormal; Notable for the following components:    Glucose,  mg/dL (2+) (*)     All other components within normal limits    Narrative:     Urine microscopic not indicated.   LACTIC ACID, PLASMA - Abnormal; Notable for the following components:    Lactate 2.9 (*)     All other components within normal limits   VALPROIC ACID LEVEL, TOTAL - Abnormal; Notable for the following components:    Valproic Acid 30.0 (*)     All other components within normal limits    Narrative:     Therapeutic Ranges for Valproic Acid    Epilepsy:       mcg/ml  Bipolar/Concepcion  up to 125 mcg/ml     CBC WITH AUTO DIFFERENTIAL - Abnormal; Notable for the following components:    WBC 11.96 (*)     Hemoglobin 16.3 (*)     Hematocrit 46.9 (*)     .3 (*)     MCH 37.3 (*)     All other components within normal limits   MANUAL DIFFERENTIAL - Abnormal; Notable for the following components:    Neutrophil % 82.0 (*)     Lymphocyte % 8.0 (*)     Neutrophils Absolute 9.81 (*)     Monocytes Absolute 1.08 (*)     All other components within  normal limits   TROPONIN - Normal    Narrative:     High Sensitive Troponin T Reference Range:  <14.0 ng/L- Negative Female for AMI  <22.0 ng/L- Negative Male for AMI  >=14 - Abnormal Female indicating possible myocardial injury.  >=22 - Abnormal Male indicating possible myocardial injury.   Clinicians would have to utilize clinical acumen, EKG, Troponin, and serial changes to determine if it is an Acute Myocardial Infarction or myocardial injury due to an underlying chronic condition.        CK - Normal   MAGNESIUM - Normal   HIGH SENSITIVITIY TROPONIN T 1HR - Normal    Narrative:     High Sensitive Troponin T Reference Range:  <14.0 ng/L- Negative Female for AMI  <22.0 ng/L- Negative Male for AMI  >=14 - Abnormal Female indicating possible myocardial injury.  >=22 - Abnormal Male indicating possible myocardial injury.   Clinicians would have to utilize clinical acumen, EKG, Troponin, and serial changes to determine if it is an Acute Myocardial Infarction or myocardial injury due to an underlying chronic condition.        BLOOD CULTURE   BLOOD CULTURE   LACTIC ACID, REFLEX   CBC AND DIFFERENTIAL    Narrative:     The following orders were created for panel order CBC & Differential.  Procedure                               Abnormality         Status                     ---------                               -----------         ------                     CBC Auto Differential[[id number]]        Abnormal            Final result                 Please view results for these tests on the individual orders.       EKG:   ECG 12 Lead Altered Mental Status   Preliminary Result   HEART RATE=93  bpm   RR Vnfxpdck=715  ms   PA Interval=  ms   P Horizontal Axis=  deg   P Front Axis=  deg   QRSD Interval=93  ms   QT Fsghkwgw=528  ms   AHfB=018  ms   QRS Axis=-1  deg   T Wave Axis=-80  deg   - ABNORMAL ECG -   Atrial fibrillation   Anteroseptal infarct, age indeterminate   Date and Time of Study:2025-02-15 17:37:07          Meds  given in ED:   Medications   sodium chloride 0.9 % flush 10 mL (has no administration in time range)   acetaminophen (TYLENOL) tablet 650 mg (has no administration in time range)     Or   acetaminophen (TYLENOL) 160 MG/5ML oral solution 650 mg (has no administration in time range)     Or   acetaminophen (TYLENOL) suppository 650 mg (has no administration in time range)   ondansetron ODT (ZOFRAN-ODT) disintegrating tablet 4 mg (has no administration in time range)     Or   ondansetron (ZOFRAN) injection 4 mg (has no administration in time range)   melatonin tablet 2.5 mg (has no administration in time range)   sennosides-docusate (PERICOLACE) 8.6-50 MG per tablet 2 tablet (has no administration in time range)     And   polyethylene glycol (MIRALAX) packet 17 g (has no administration in time range)     And   bisacodyl (DULCOLAX) EC tablet 5 mg (has no administration in time range)     And   bisacodyl (DULCOLAX) suppository 10 mg (has no administration in time range)       Imaging results:  CT Head Without Contrast    Result Date: 2/15/2025   No evidence for acute traumatic intracranial pathology.  However, there is an age-indeterminate infarct within the lateral aspect of the left frontal lobe within the left MCA distribution. Further temporal characterization with MR imaging is suggested.  Chronic supratentorial and infratentorial infarcts are identified as discussed in detail above. Mild to moderate changes of chronic small vessel ischemic phenomena and old lacunar disease are noted.  These findings and recommendations were discussed with Dr. Zelaya on 2/15/2025 at approximately 7:05 p.m.   Radiation dose reduction techniques were utilized, including automated exposure control and exposure modulation based on body size.  This report was finalized on 2/15/2025 7:05 PM by Dr. Ricky Meneses M.D on Workstation: [id number]       Ambulatory status:   - asst     Social issues:   Social History     Socioeconomic History     Marital status: Single   Tobacco Use    Smoking status: Never     Passive exposure: Never    Smokeless tobacco: Never   Vaping Use    Vaping status: Never Used   Substance and Sexual Activity    Drug use: Never    Sexual activity: Not Currently       Peripheral Neurovascular       Neuro Cognitive  [place] Coma Scale  Best Eye Response: 4-->(E4) spontaneous  Best Motor Response: 6-->(M6) obeys commands  Best Verbal Response: 4-->(V4) confused  [place] Coma Scale Score: 14    Learning  Learning Assessment  Learning Readiness and Ability: no barriers identified  Education Provided  Person Taught: patient    Respiratory       Abdominal Pain       Pain Assessments  Pain (Adult)  (0-10) Pain Rating: Rest: 0    NIH Stroke Scale       Aisha Chavarria RN  02/15/25 21:06 EST

## 2025-02-16 NOTE — THERAPY EVALUATION
Patient Name: Madelene Naegele  : 1940    MRN: [id number]                              Today's Date: 2025       Admit Date: 2/15/2025    Visit Dx:     ICD-10-CM ICD-9-CM   1. Acute encephalopathy  G93.40 348.30   2. Acute kidney injury  N17.9 584.9   3. Abnormal head CT  R93.0 793.0   4. Fall, initial encounter  W19.XXXA E888.9   5. Chronic anticoagulation  Z79.01 V58.61   6. Laceration of right hand without foreign body, initial encounter  S61.411A 882.0     Patient Active Problem List   Diagnosis    Cellulitis of left leg    Severe sepsis    HTN (hypertension)    Atrial fibrillation, persistent    Hyperlipidemia    History of stroke    CAD (coronary artery disease)    Type 2 diabetes mellitus, without long-term current use of insulin    Acute encephalopathy    Altered mental status     Past Medical History:   Diagnosis Date    CAD (coronary artery disease)     History of stroke     Hyperlipidemia     Hypertension     Persistent atrial fibrillation     Type 2 diabetes mellitus      History reviewed. No pertinent surgical history.   General Information       Row Name 25 1213          Physical Therapy Time and Intention    Document Type evaluation  -CC     Mode of Treatment individual therapy;physical therapy  -CC       Row Name 25 1213          General Information    Patient Profile Reviewed yes  -CC     Existing Precautions/Restrictions fall  -CC     Barriers to Rehab medically complex;previous functional deficit  -CC       Row Name 25 1213          Living Environment    People in Home alone  -CC       Row Name 25 1213          Cognition    Orientation Status (Cognition) oriented to;person;place;situation  -CC       Row Name 25 1213          Safety Issues/Impairments Affecting Functional Mobility    Impairments Affecting Function (Mobility) endurance/activity tolerance;balance  -CC               User Key  (r) = Recorded By, (t) = Taken By, (c) = Cosigned By      Initials  Name Provider Type    Angeline Love, PT Physical Therapist                   Mobility       Row Name 02/16/25 1213          Bed Mobility    Bed Mobility bed mobility (all) activities  -     All Activities, [place] (Bed Mobility) minimum assist (75% patient effort)  -       Row Name 02/16/25 1213          Sit-Stand Transfer    Sit-Stand [place] (Transfers) minimum assist (75% patient effort)  -     Assistive Device (Sit-Stand Transfers) walker, front-wheeled  -       Row Name 02/16/25 1213          Gait/Stairs (Locomotion)    [place] Level (Gait) contact guard  -     Assistive Device (Gait) walker, front-wheeled  -     Patient was able to Ambulate yes  -     Distance in Feet (Gait) --  side steps to HOB  -               User Key  (r) = Recorded By, (t) = Taken By, (c) = Cosigned By      Initials Name Provider Type    Angeline Love PT Physical Therapist                   Obj/Interventions       Row Name 02/16/25 1214          Strength Comprehensive (MMT)    General Manual Muscle Testing (MMT) Assessment lower extremity strength deficits identified  -               User Key  (r) = Recorded By, (t) = Taken By, (c) = Cosigned By      Initials Name Provider Type     Angeline Turcios, PT Physical Therapist                   Goals/Plan       Row Name 02/16/25 1215          Bed Mobility Goal 1 (PT)    Activity/Assistive Device (Bed Mobility Goal 1, PT) bed mobility activities, all  -CC     [place] Level/Cues Needed (Bed Mobility Goal 1, PT) contact guard required  -CC     Time Frame (Bed Mobility Goal 1, PT) by discharge  -       Row Name 02/16/25 1215          Transfer Goal 1 (PT)    Activity/Assistive Device (Transfer Goal 1, PT) transfers, all  -CC     [place] Level/Cues Needed (Transfer Goal 1, PT) contact guard required  -CC     Time Frame (Transfer Goal 1, PT) by discharge  -       Row Name 02/16/25 1215          Gait Training Goal 1 (PT)     Activity/Assistive Device (Gait Training Goal 1, PT) gait (walking locomotion);assistive device use  -CC     [place] Level (Gait Training Goal 1, PT) contact guard required  -CC     Distance (Gait Training Goal 1, PT) 50  -CC     Time Frame (Gait Training Goal 1, PT) by discharge  -CC               User Key  (r) = Recorded By, (t) = Taken By, (c) = Cosigned By      Initials Name Provider Type    Angeline Love PT Physical Therapist                   Clinical Impression       Row Name 02/16/25 1214          Plan of Care Review    Outcome Evaluation Pt is a 84 y.o. female admitted to [facility] with c/o fall on 2/15/2025. Work up reveals acute encephalopathy. Pt presents today with generalized weakness, and decreased functional mobility. Pt required min A for bed mobility, min A for STS transfers, and CGA for ambulation with FWW for side steps to HOB. Pt will benefit from skilled PT to address the previous deficits and improve overall safety with functional mobility. Prior to admission pt lives alone. Recommend SNF at d/c at this time..  -CC       Row Name 02/16/25 1214          Therapy Assessment/Plan (PT)    Rehab Potential (PT) fair  -CC     Criteria for Skilled Interventions Met (PT) yes  -CC     Therapy Frequency (PT) 5 times/wk  -CC       Row Name 02/16/25 1214          Positioning and Restraints    Pre-Treatment Position in bed  -CC     Post Treatment Position bed  -CC     In Bed supine;call light within reach;encouraged to call for assist;exit alarm on  -CC               User Key  (r) = Recorded By, (t) = Taken By, (c) = Cosigned By      Initials Name Provider Type    Angeline Love, JESSI Physical Therapist                   Outcome Measures       Row Name 02/16/25 1216 02/16/25 0805       How much help from another person do you currently need...    Turning from your back to your side while in flat bed without using bedrails? 3  -CC 3  -BR    Moving from lying on back to sitting on the side of  a flat bed without bedrails? 3  -CC 3  -BR    Moving to and from a bed to a chair (including a wheelchair)? 3  -CC 3  -BR    Standing up from a chair using your arms (e.g., wheelchair, bedside chair)? 3  -CC 3  -BR    Climbing 3-5 steps with a railing? 2  -CC 2  -BR    To walk in hospital room? 2  -CC 2  -BR    AM-PAC 6 Clicks Score (PT) 16  -CC 16  -BR    Highest Level of Mobility Goal 5 --> Static standing  -CC 5 --> Static standing  -BR      Row Name 02/16/25 1141          Modified [place] Scale    Pre-Stroke Modified [place] Scale 2 - Slight disability.  Unable to carry out all previous activities but able to look after own affairs without assistance.  -       Row Name 02/16/25 1216 02/16/25 1141       Functional Assessment    Outcome Measure Options AM-PAC 6 Clicks Basic Mobility (PT)  -CC AM-PAC 6 Clicks Daily Activity (OT);Modified [place]  -              User Key  (r) = Recorded By, (t) = Taken By, (c) = Cosigned By      Initials Name Provider Type    Mathew Cano, RN Registered Nurse    CC Angeline Turcios, PT Physical Therapist    LH Daja Seo, OT Occupational Therapist                                 Physical Therapy Education       Title: PT OT SLP Therapies (In Progress)       Topic: Physical Therapy (Not Started)       Point: Mobility training (Not Started)       Learner Progress:  Not documented in this visit.              Point: Home exercise program (Not Started)       Learner Progress:  Not documented in this visit.              Point: Body mechanics (Not Started)       Learner Progress:  Not documented in this visit.              Point: Precautions (Not Started)       Learner Progress:  Not documented in this visit.                                  PT Recommendation and Plan     Outcome Evaluation: Pt is a 84 y.o. female admitted to [facility] with c/o fall on 2/15/2025. Work up reveals acute encephalopathy. Pt presents today with generalized weakness, and decreased functional mobility. Pt  required min A for bed mobility, min A for STS transfers, and CGA for ambulation with FWW for side steps to HOB. Pt will benefit from skilled PT to address the previous deficits and improve overall safety with functional mobility. Prior to admission pt lives alone. Recommend SNF at d/c at this time..     Time Calculation:         PT Charges       Row Name 02/16/25 1216             Time Calculation    Start Time 1117  -CC      Stop Time 1128  -CC      Time Calculation (min) 11 min  -CC      PT Received On 02/16/25  -CC      PT - Next Appointment 02/17/25  -CC      PT Goal Re-Cert Due Date 02/23/25  -CC         Time Calculation- PT    Total Timed Code Minutes- PT 8 minute(s)  -CC         Timed Charges    84834 - PT Therapeutic Activity Minutes 8  -CC         Untimed Charges    PT Eval/Re-eval Minutes 3  -CC         Total Minutes    Timed Charges Total Minutes 8  -CC      Untimed Charges Total Minutes 3  -CC       Total Minutes 11  -CC                User Key  (r) = Recorded By, (t) = Taken By, (c) = Cosigned By      Initials Name Provider Type    CC Angeline Turcios, PT Physical Therapist                  Therapy Charges for Today       Code Description Service Date Service Provider Modifiers Qty    13593663672 HC PT EVAL MOD COMPLEXITY 2 2/16/2025 Angeline Turcios, PT GP 1    68310580507 HC PT THERAPEUTIC ACT EA 15 MIN 2/16/2025 Angeline Turcios, PT GP 1            PT G-Codes  Outcome Measure Options: AM-PAC 6 Clicks Basic Mobility (PT)  AM-PAC 6 Clicks Score (PT): 16  AM-PAC 6 Clicks Score (OT): 18  PT Discharge Summary  Anticipated Discharge Disposition (PT): skilled nursing facility    Angeline Turcios PT  2/16/2025

## 2025-02-16 NOTE — PROGRESS NOTES
Name: Madelene Naegele ADMIT: 2/15/2025   : 1940  PCP: Ava Sebastian MD    MRN: [id number] LOS: 0 days   AGE/SEX: 84 y.o. female  ROOM: [facility]     Subjective   Subjective   She denies complaints. No chest pain, shortness of breath, abdominal pain. Has some expressive aphasia which is not new.     Objective   Objective   Vital Signs  Temp:  [97.7 °F (36.5 °C)-98.5 °F (36.9 °C)] 97.9 °F (36.6 °C)  Heart Rate:  [] 101  Resp:  [16-22] 18  BP: (101-119)/(56-77) 106/61  SpO2:  [90 %-100 %] 100 %  on   ;      Body mass index is 26.29 kg/m².    Physical Exam  Constitutional:       General: She is not in acute distress.     Appearance: She is not toxic-appearing.   HENT:      Head: Normocephalic and atraumatic.   Cardiovascular:      Rate and Rhythm: Normal rate and regular rhythm.   Pulmonary:      Effort: Pulmonary effort is normal. No respiratory distress.      Breath sounds: Normal breath sounds. No wheezing or rhonchi.   Abdominal:      General: Bowel sounds are normal.      Palpations: Abdomen is soft.      Tenderness: There is no abdominal tenderness. There is no guarding or rebound.   Musculoskeletal:         General: No swelling.   Skin:     General: Skin is warm and dry.   Neurological:      Mental Status: She is alert.      Cranial Nerves: No facial asymmetry.      Comments: Moving all extremities. Expressive aphasia (word finding). Maybe a little receptive aphasia. Oriented to self, hospital, fall.   Psychiatric:         Mood and Affect: Mood normal.         Behavior: Behavior normal.     Results Review  I reviewed the patient's new clinical results.  Results from last 7 days   Lab Units 25  0602 02/15/25  1744   WBC 10*3/mm3 9.91 11.96*   HEMOGLOBIN g/dL 14.8 16.3*   PLATELETS 10*3/mm3 143 170     Results from last 7 days   Lab Units 25  0602 02/15/25  1744   SODIUM mmol/L 140 138   POTASSIUM mmol/L 4.0 4.4   CHLORIDE mmol/L 103 101   CO2 mmol/L 27.0 24.0   BUN mg/dL 24* 27*    CREATININE mg/dL 1.20* 1.38*   GLUCOSE mg/dL 158* 198*     Lab Results   Component Value Date    ANIONGAP 10.0 02/16/2025     Estimated Creatinine Clearance: 31.5 mL/min (A) (by C-G formula based on SCr of 1.2 mg/dL (H)).   Lab Results   Component Value Date    EGFR 45.0 (L) 02/16/2025     Results from last 7 days   Lab Units 02/15/25  1744   ALBUMIN g/dL 4.0   BILIRUBIN mg/dL 0.9   ALK PHOS U/L 62   AST (SGOT) U/L 16   ALT (SGPT) U/L 12     Results from last 7 days   Lab Units 02/16/25  0602 02/15/25  1744   CALCIUM mg/dL 8.9 9.3   ALBUMIN g/dL  --  4.0   MAGNESIUM mg/dL  --  1.8     Results from last 7 days   Lab Units 02/15/25  2048 02/15/25  1744   LACTATE mmol/L 1.6 2.9*     Glucose   Date/Time Value Ref Range Status   02/16/2025 1120 254 (H) 70 - 130 mg/dL Final   02/16/2025 0732 143 (H) 70 - 130 mg/dL Final       CT Head Without Contrast    Result Date: 2/15/2025   No evidence for acute traumatic intracranial pathology.  However, there is an age-indeterminate infarct within the lateral aspect of the left frontal lobe within the left MCA distribution. Further temporal characterization with MR imaging is suggested.  Chronic supratentorial and infratentorial infarcts are identified as discussed in detail above. Mild to moderate changes of chronic small vessel ischemic phenomena and old lacunar disease are noted.  These findings and recommendations were discussed with Dr. Zelaya on 2/15/2025 at approximately 7:05 p.m.   Radiation dose reduction techniques were utilized, including automated exposure control and exposure modulation based on body size.  This report was finalized on 2/15/2025 7:05 PM by Dr. Ricky Meneses M.D on Workstation: [id number]       Scheduled Meds  apixaban, 5 mg, Oral, BID  aspirin, 81 mg, Oral, Daily  atorvastatin, 40 mg, Oral, Daily  cholecalciferol, 2,000 Units, Oral, Daily  dilTIAZem CD, 240 mg, Oral, Q24H  divalproex, 125 mg, Oral, BID  gabapentin, 100 mg, Oral, BID  insulin  lispro, 2-7 Units, Subcutaneous, 4x Daily AC & at Bedtime  metoprolol succinate XL, 50 mg, Oral, Daily  potassium chloride, 40 mEq, Oral, Daily  sertraline, 25 mg, Oral, Daily    Continuous Infusions  lactated ringers, 75 mL/hr, Last Rate: 75 mL/hr (02/16/25 1205)    PRN Meds    acetaminophen **OR** acetaminophen **OR** acetaminophen    senna-docusate sodium **AND** polyethylene glycol **AND** bisacodyl **AND** bisacodyl    dextrose    dextrose    glucagon (human recombinant)    hydrOXYzine    melatonin    ondansetron ODT **OR** ondansetron    [COMPLETED] Insert Peripheral IV **AND** sodium chloride    lactated ringers, 75 mL/hr, Last Rate: 75 mL/hr (02/16/25 1205)    Diet  Diet: Cardiac; Healthy Heart (2-3 Na+); Fluid Consistency: Thin (IDDSI 0)       Assessment/Plan     Active Hospital Problems    Diagnosis  POA    **Acute encephalopathy [G93.40]  Yes    CKD (chronic kidney disease) stage 3, GFR 30-59 ml/min [N18.30]  Unknown    Altered mental status [R41.82]  Yes    Atrial fibrillation, persistent [I48.19]  Yes    CAD (coronary artery disease) [I25.10]  Yes    History of stroke [Z86.73]  Not Applicable    HTN (hypertension) [I10]  Yes    Hyperlipidemia [E78.5]  Yes    Type 2 diabetes mellitus, without long-term current use of insulin [E11.9]  Yes      Resolved Hospital Problems   No resolved problems to display.     Patient is a 84 y.o. female     Fall  Altered mental status  History of stroke 2022 with aphasia  CT age-indeterminate infarct left frontal, chronic infarcts-check MRI  B12 and folate okay  Blood cultures in progress. Urine okay. Check chest x-ray    Acute kidney injury   CKD 3  FOUZIA probably from dehydration. Continue IVF creatinine improved some  Was probably little hemoconcentrated. Check orthostatics  Holding ACE inhibitor, torsemide and potassium, metformin    Atrial fibrillation  Apixaban, metoprolol    Diabetes mellitus type 2  Metformin on hold. Currently on correctional insulin  A1c 7.8% in  May 2024    Hypertension BP fine (low normal)  Hyperlipidemia atorvastatin    Discharge  TBD  Expected discharge date/ time has not been documented.    Discussed with patient and nursing staff    Sandor Hansen MD  [facility]  02/16/25 13:11 EST

## 2025-02-17 ENCOUNTER — APPOINTMENT (OUTPATIENT)
Dept: MRI IMAGING | Facility: HOSPITAL | Age: 85
End: 2025-02-17
Payer: MEDICARE

## 2025-02-17 ENCOUNTER — APPOINTMENT (OUTPATIENT)
Dept: GENERAL RADIOLOGY | Facility: HOSPITAL | Age: 85
End: 2025-02-17
Payer: MEDICARE

## 2025-02-17 LAB
ANION GAP SERPL CALCULATED.3IONS-SCNC: 9.9 MMOL/L (ref 5–15)
BUN SERPL-MCNC: 24 MG/DL (ref 8–23)
BUN/CREAT SERPL: 20.5 (ref 7–25)
CALCIUM SPEC-SCNC: 8.8 MG/DL (ref 8.6–10.5)
CHLORIDE SERPL-SCNC: 102 MMOL/L (ref 98–107)
CO2 SERPL-SCNC: 27.1 MMOL/L (ref 22–29)
CREAT SERPL-MCNC: 1.17 MG/DL (ref 0.57–1)
DEPRECATED RDW RBC AUTO: 53.5 FL (ref 37–54)
EGFRCR SERPLBLD CKD-EPI 2021: 46.4 ML/MIN/1.73
ERYTHROCYTE [DISTWIDTH] IN BLOOD BY AUTOMATED COUNT: 13.2 % (ref 12.3–15.4)
GLUCOSE BLDC GLUCOMTR-MCNC: 134 MG/DL (ref 70–130)
GLUCOSE BLDC GLUCOMTR-MCNC: 154 MG/DL (ref 70–130)
GLUCOSE BLDC GLUCOMTR-MCNC: 171 MG/DL (ref 70–130)
GLUCOSE BLDC GLUCOMTR-MCNC: 252 MG/DL (ref 70–130)
GLUCOSE SERPL-MCNC: 164 MG/DL (ref 65–99)
HCT VFR BLD AUTO: 40.8 % (ref 34–46.6)
HGB BLD-MCNC: 13.7 G/DL (ref 12–15.9)
MAGNESIUM SERPL-MCNC: 2 MG/DL (ref 1.6–2.4)
MCH RBC QN AUTO: 36.4 PG (ref 26.6–33)
MCHC RBC AUTO-ENTMCNC: 33.6 G/DL (ref 31.5–35.7)
MCV RBC AUTO: 108.5 FL (ref 79–97)
PHOSPHATE SERPL-MCNC: 3 MG/DL (ref 2.5–4.5)
PLATELET # BLD AUTO: 135 10*3/MM3 (ref 140–450)
PMV BLD AUTO: 10.5 FL (ref 6–12)
POTASSIUM SERPL-SCNC: 4.5 MMOL/L (ref 3.5–5.2)
QT INTERVAL: 343 MS
QTC INTERVAL: 427 MS
RBC # BLD AUTO: 3.76 10*6/MM3 (ref 3.77–5.28)
SODIUM SERPL-SCNC: 139 MMOL/L (ref 136–145)
WBC NRBC COR # BLD AUTO: 10.18 10*3/MM3 (ref 3.4–10.8)

## 2025-02-17 PROCEDURE — 83735 ASSAY OF MAGNESIUM: CPT | Performed by: HOSPITALIST

## 2025-02-17 PROCEDURE — 85027 COMPLETE CBC AUTOMATED: CPT | Performed by: HOSPITALIST

## 2025-02-17 PROCEDURE — 70551 MRI BRAIN STEM W/O DYE: CPT

## 2025-02-17 PROCEDURE — 82948 REAGENT STRIP/BLOOD GLUCOSE: CPT

## 2025-02-17 PROCEDURE — 84100 ASSAY OF PHOSPHORUS: CPT | Performed by: HOSPITALIST

## 2025-02-17 PROCEDURE — 25810000003 LACTATED RINGERS PER 1000 ML: Performed by: HOSPITALIST

## 2025-02-17 PROCEDURE — 71045 X-RAY EXAM CHEST 1 VIEW: CPT

## 2025-02-17 PROCEDURE — 63710000001 INSULIN LISPRO (HUMAN) PER 5 UNITS: Performed by: INTERNAL MEDICINE

## 2025-02-17 PROCEDURE — 80048 BASIC METABOLIC PNL TOTAL CA: CPT | Performed by: HOSPITALIST

## 2025-02-17 RX ADMIN — ATORVASTATIN CALCIUM 40 MG: 20 TABLET, FILM COATED ORAL at 09:44

## 2025-02-17 RX ADMIN — DIVALPROEX SODIUM 125 MG: 125 TABLET, DELAYED RELEASE ORAL at 09:44

## 2025-02-17 RX ADMIN — GABAPENTIN 100 MG: 100 CAPSULE ORAL at 09:44

## 2025-02-17 RX ADMIN — ASPIRIN 81 MG: 81 TABLET, COATED ORAL at 09:44

## 2025-02-17 RX ADMIN — APIXABAN 5 MG: 5 TABLET, FILM COATED ORAL at 21:03

## 2025-02-17 RX ADMIN — SODIUM CHLORIDE, POTASSIUM CHLORIDE, SODIUM LACTATE AND CALCIUM CHLORIDE 75 ML/HR: 600; 310; 30; 20 INJECTION, SOLUTION INTRAVENOUS at 00:01

## 2025-02-17 RX ADMIN — GABAPENTIN 100 MG: 100 CAPSULE ORAL at 21:03

## 2025-02-17 RX ADMIN — APIXABAN 5 MG: 5 TABLET, FILM COATED ORAL at 09:44

## 2025-02-17 RX ADMIN — INSULIN LISPRO 4 UNITS: 100 INJECTION, SOLUTION INTRAVENOUS; SUBCUTANEOUS at 17:59

## 2025-02-17 RX ADMIN — METOPROLOL SUCCINATE 50 MG: 25 TABLET, EXTENDED RELEASE ORAL at 09:44

## 2025-02-17 RX ADMIN — INSULIN LISPRO 2 UNITS: 100 INJECTION, SOLUTION INTRAVENOUS; SUBCUTANEOUS at 21:03

## 2025-02-17 RX ADMIN — DILTIAZEM HYDROCHLORIDE 240 MG: 120 CAPSULE, EXTENDED RELEASE ORAL at 09:44

## 2025-02-17 RX ADMIN — SERTRALINE HYDROCHLORIDE 25 MG: 50 TABLET, FILM COATED ORAL at 09:44

## 2025-02-17 RX ADMIN — Medication 2000 UNITS: at 09:44

## 2025-02-17 RX ADMIN — POTASSIUM CHLORIDE 40 MEQ: 1500 TABLET, EXTENDED RELEASE ORAL at 09:44

## 2025-02-17 RX ADMIN — DIVALPROEX SODIUM 125 MG: 125 TABLET, DELAYED RELEASE ORAL at 21:03

## 2025-02-17 NOTE — PLAN OF CARE
Goal Outcome Evaluation:      Pt resting on the bed, AO x 2, 2L o2 nc, afib on monitor, administered meds per mar, vss, no c/o pain, will continue to monitor.      Problem: Adult Inpatient Plan of Care  Goal: Absence of Hospital-Acquired Illness or Injury  Intervention: Identify and Manage Fall Risk  Recent Flowsheet Documentation  Taken 2/17/2025 0408 by Kelly Zavala RN  Safety Promotion/Fall Prevention:   activity supervised   room organization consistent   safety round/check completed  Taken 2/17/2025 0208 by Kelly Zavala RN  Safety Promotion/Fall Prevention:   activity supervised   room organization consistent   safety round/check completed  Taken 2/17/2025 0010 by Kelly Zavala RN  Safety Promotion/Fall Prevention:   activity supervised   room organization consistent   safety round/check completed  Taken 2/16/2025 2208 by Kelly Zavala RN  Safety Promotion/Fall Prevention:   activity supervised   room organization consistent   safety round/check completed  Taken 2/16/2025 2050 by Kelly Zavala RN  Safety Promotion/Fall Prevention:   activity supervised   room organization consistent   safety round/check completed  Intervention: Prevent Skin Injury  Recent Flowsheet Documentation  Taken 2/17/2025 0408 by Kelly Zavala RN  Body Position: position changed independently  Taken 2/17/2025 0208 by Kelly Zavala RN  Body Position: position changed independently  Taken 2/17/2025 0010 by Kelly Zavala RN  Body Position: position changed independently  Skin Protection: incontinence pads utilized  Taken 2/16/2025 2208 by Kelly Zavala RN  Body Position: position changed independently  Taken 2/16/2025 2050 by Kelly Zavala RN  Body Position: position changed independently  Skin Protection: incontinence pads utilized  Intervention: Prevent Infection  Recent Flowsheet Documentation  Taken 2/17/2025 0408 by Kelly Zavala RN  Infection Prevention:   hand hygiene  promoted   rest/sleep promoted  Taken 2/17/2025 0208 by Kelly Zavala RN  Infection Prevention:   hand hygiene promoted   rest/sleep promoted  Taken 2/16/2025 2208 by Kelly Zavala RN  Infection Prevention:   hand hygiene promoted   rest/sleep promoted  Goal: Optimal Comfort and Wellbeing  Intervention: Provide Person-Centered Care  Recent Flowsheet Documentation  Taken 2/17/2025 0010 by Kelly Zavala RN  Trust Relationship/Rapport:   care explained   choices provided  Taken 2/16/2025 2050 by Kelly Zavala RN  Trust Relationship/Rapport:   care explained   choices provided     Problem: Comorbidity Management  Goal: Blood Glucose Level Within Target Range  Intervention: Monitor and Manage Glycemia  Recent Flowsheet Documentation  Taken 2/17/2025 0408 by Kelly Zavala RN  Medication Review/Management: medications reviewed  Taken 2/17/2025 0208 by Kelly Zavala RN  Medication Review/Management: medications reviewed  Taken 2/17/2025 0010 by Kelly Zavala RN  Medication Review/Management: medications reviewed  Taken 2/16/2025 2208 by Kelly Zavala RN  Medication Review/Management: medications reviewed  Taken 2/16/2025 2050 by Kelly Zavala RN  Medication Review/Management: medications reviewed     Problem: Fall Injury Risk  Goal: Absence of Fall and Fall-Related Injury  Intervention: Identify and Manage Contributors  Recent Flowsheet Documentation  Taken 2/17/2025 0408 by Kelly Zavala RN  Medication Review/Management: medications reviewed  Taken 2/17/2025 0208 by Kelly Zavala RN  Medication Review/Management: medications reviewed  Taken 2/17/2025 0010 by Kelly Zavala RN  Medication Review/Management: medications reviewed  Taken 2/16/2025 2208 by Kelly Zavala RN  Medication Review/Management: medications reviewed  Taken 2/16/2025 2050 by Kelly Zavala RN  Medication Review/Management: medications reviewed  Intervention: Promote Injury-Free  Environment  Recent Flowsheet Documentation  Taken 2/17/2025 0408 by Kelly Zavala RN  Safety Promotion/Fall Prevention:   activity supervised   room organization consistent   safety round/check completed  Taken 2/17/2025 0208 by Kelly Zavala RN  Safety Promotion/Fall Prevention:   activity supervised   room organization consistent   safety round/check completed  Taken 2/17/2025 0010 by Kelly Zavala RN  Safety Promotion/Fall Prevention:   activity supervised   room organization consistent   safety round/check completed  Taken 2/16/2025 2208 by Kelly Zavala RN  Safety Promotion/Fall Prevention:   activity supervised   room organization consistent   safety round/check completed  Taken 2/16/2025 2050 by Kelly Zavala RN  Safety Promotion/Fall Prevention:   activity supervised   room organization consistent   safety round/check completed

## 2025-02-17 NOTE — CASE MANAGEMENT/SOCIAL WORK
Continued Stay Note  [facility]     Patient Name: Madelene Naegele  MRN: [id number]  Today's Date: 2/17/2025    Admit Date: 2/15/2025    Plan: CCP following for needs.   BRY Parker RN   Discharge Plan       Row Name 02/17/25 1024       Plan    Plan CCP following for needs.   BRY Parker RN    Patient/Family in Agreement with Plan yes    Plan Comments FACE SHEET VERIFIED/ IM LETTER SIGNED.  Spoke with pt at bedside.  Called pt's POA and [facility] ( Jovita Ortega [phone]).  Pt's PCP is Dr. Ava Sebastian.  Pt lives alone in a single story condo . Pt does have caregivers from 9:30-4:30. Pt requires assistance with ADLs.  Pt has a bath bench, grab bar, rollator, and rolling walker for home use if needed.  Pt gets her prescriptions at [facility]  Pt does not have issues affording medications. Pt is not current with . Pt was in [facility] but was not happy there.  CCP following for needs.  BRY Parker RN                   Discharge Codes    No documentation.                 Expected Discharge Date and Time       Expected Discharge Date Expected Discharge Time    Feb 21, 2025               Rachel Parker RN

## 2025-02-17 NOTE — SIGNIFICANT NOTE
02/17/25 1521   OTHER   Discipline physical therapist   Rehab Time/Intention   Session Not Performed other (see comments)  (FOSTER in AM for MRI. PT unable to return in the PM.)   Therapy Assessment/Plan (PT)   Criteria for Skilled Interventions Met (PT) yes   Recommendation   PT - Next Appointment 02/18/25

## 2025-02-17 NOTE — PROGRESS NOTES
Name: Madelene Naegele ADMIT: 2/15/2025   : 1940  PCP: Ava Sebastian MD    MRN: [id number] LOS: 1 days   AGE/SEX: 84 y.o. female  ROOM: [facility]     Subjective   Subjective   She denies complaints. No chest pain, shortness of breath, abdominal pain. Has some expressive aphasia which is not new.     Objective   Objective   Vital Signs  Temp:  [97.4 °F (36.3 °C)-97.9 °F (36.6 °C)] 97.4 °F (36.3 °C)  Heart Rate:  [] 112  Resp:  [18] 18  BP: (105-112)/(67-73) 110/67  SpO2:  [99 %-100 %] 100 %  on   ;      Body mass index is 26.21 kg/m².    Physical Exam  Constitutional:       General: She is not in acute distress.     Appearance: She is not toxic-appearing.   HENT:      Head: Normocephalic and atraumatic.   Cardiovascular:      Rate and Rhythm: Normal rate and regular rhythm.   Pulmonary:      Effort: Pulmonary effort is normal. No respiratory distress.      Breath sounds: Normal breath sounds. No wheezing or rhonchi.   Abdominal:      General: Bowel sounds are normal.      Palpations: Abdomen is soft.      Tenderness: There is no abdominal tenderness. There is no guarding or rebound.   Musculoskeletal:         General: No swelling.   Skin:     General: Skin is warm and dry.   Neurological:      Mental Status: She is alert and oriented to person, place, and time.      Cranial Nerves: No facial asymmetry.   Psychiatric:         Mood and Affect: Mood normal.         Behavior: Behavior normal.     Results Review  I reviewed the patient's new clinical results.  Results from last 7 days   Lab Units 25  0558 25  0602 02/15/25  1744   WBC 10*3/mm3 10.18 9.91 11.96*   HEMOGLOBIN g/dL 13.7 14.8 16.3*   PLATELETS 10*3/mm3 135* 143 170     Results from last 7 days   Lab Units 25  0558 25  0602 02/15/25  1744   SODIUM mmol/L 139 140 138   POTASSIUM mmol/L 4.5 4.0 4.4   CHLORIDE mmol/L 102 103 101   CO2 mmol/L 27.1 27.0 24.0   BUN mg/dL 24* 24* 27*   CREATININE mg/dL 1.17* 1.20* 1.38*    GLUCOSE mg/dL 164* 158* 198*     Lab Results   Component Value Date    ANIONGAP 9.9 02/17/2025     Estimated Creatinine Clearance: 32.3 mL/min (A) (by C-G formula based on SCr of 1.17 mg/dL (H)).   Lab Results   Component Value Date    EGFR 46.4 (L) 02/17/2025     Results from last 7 days   Lab Units 02/15/25  1744   ALBUMIN g/dL 4.0   BILIRUBIN mg/dL 0.9   ALK PHOS U/L 62   AST (SGOT) U/L 16   ALT (SGPT) U/L 12     Results from last 7 days   Lab Units 02/17/25  0558 02/16/25  0602 02/15/25  1744   CALCIUM mg/dL 8.8 8.9 9.3   ALBUMIN g/dL  --   --  4.0   MAGNESIUM mg/dL 2.0  --  1.8   PHOSPHORUS mg/dL 3.0  --   --      Results from last 7 days   Lab Units 02/15/25  2048 02/15/25  1744   LACTATE mmol/L 1.6 2.9*     Glucose   Date/Time Value Ref Range Status   02/17/2025 1042 134 (H) 70 - 130 mg/dL Final   02/17/2025 0637 154 (H) 70 - 130 mg/dL Final   02/16/2025 2055 199 (H) 70 - 130 mg/dL Final   02/16/2025 1631 148 (H) 70 - 130 mg/dL Final   02/16/2025 1120 254 (H) 70 - 130 mg/dL Final   02/16/2025 0732 143 (H) 70 - 130 mg/dL Final       MRI Brain Without Contrast    Result Date: 2/17/2025  1. No acute intracranial abnormality is identified with no acute infarct identified. 2. Since the prior outside MRI of the brain from [facility] on 05/24/2024 the acute infarct extending from the inferior lateral left parietal lobe into the superior lateral left temporal occipital region has evolved to a chronic infarct measuring 4 x 3 x 4 cm in size and there are areas of hemosiderin deposition methemoglobin deposition from old petechial hemorrhage into this chronic infarct in the distribution of the temporal parietal occipital branches of the left MCA territory. There is also a chronic infarct in the inferior lateral left frontal lobe that measures 2 x 1.1 x 1.2 cm also in the left MCA territory that is unchanged since outside MRI of the brain from [facility] on 05/24/2022. There are multiple tiny  chronic infarcts in the cerebellar hemispheres bilaterally in the PICA territories as described above and there is mild-moderate small vessel disease in cerebral white matter. There is an 11 x 8 x 12 mm old lacunar infarct extending from the anterior mid right corona radiata region into the superior right putamen. There is mild cerebral atrophy. Lens implants are in place in the globes from previous bilateral cataract surgery. 3. The remainder of the MRI of the brain is normal. Again specifically no acute infarct is identified. In particular the inferior lateral left frontal cortical infarct is clearly a chronic infarct and unchanged since outside MRI of the brain from [facility] on 05/24/2022.       CT Head Without Contrast    Result Date: 2/15/2025   No evidence for acute traumatic intracranial pathology.  However, there is an age-indeterminate infarct within the lateral aspect of the left frontal lobe within the left MCA distribution. Further temporal characterization with MR imaging is suggested.  Chronic supratentorial and infratentorial infarcts are identified as discussed in detail above. Mild to moderate changes of chronic small vessel ischemic phenomena and old lacunar disease are noted.  These findings and recommendations were discussed with Dr. Zelaya on 2/15/2025 at approximately 7:05 p.m.   Radiation dose reduction techniques were utilized, including automated exposure control and exposure modulation based on body size.  This report was finalized on 2/15/2025 7:05 PM by Dr. Ricky Meneses M.D on Workstation: [id number]       Scheduled Meds  apixaban, 5 mg, Oral, BID  aspirin, 81 mg, Oral, Daily  atorvastatin, 40 mg, Oral, Daily  cholecalciferol, 2,000 Units, Oral, Daily  dilTIAZem CD, 240 mg, Oral, Q24H  divalproex, 125 mg, Oral, BID  gabapentin, 100 mg, Oral, BID  insulin lispro, 2-7 Units, Subcutaneous, 4x Daily AC & at Bedtime  metoprolol succinate XL, 50 mg, Oral, Daily  potassium  chloride, 40 mEq, Oral, Daily  sertraline, 25 mg, Oral, Daily    Continuous Infusions     PRN Meds    acetaminophen **OR** acetaminophen **OR** acetaminophen    senna-docusate sodium **AND** polyethylene glycol **AND** bisacodyl **AND** bisacodyl    dextrose    dextrose    glucagon (human recombinant)    hydrOXYzine    melatonin    ondansetron ODT **OR** ondansetron    [COMPLETED] Insert Peripheral IV **AND** sodium chloride       Diet  Diet: Cardiac; Healthy Heart (2-3 Na+); Fluid Consistency: Thin (IDDSI 0)       Assessment/Plan     Active Hospital Problems    Diagnosis  POA    **Acute encephalopathy [G93.40]  Yes    CKD (chronic kidney disease) stage 3, GFR 30-59 ml/min [N18.30]  Unknown    Altered mental status [R41.82]  Yes    Atrial fibrillation, persistent [I48.19]  Yes    CAD (coronary artery disease) [I25.10]  Yes    History of stroke [Z86.73]  Not Applicable    HTN (hypertension) [I10]  Yes    Hyperlipidemia [E78.5]  Yes    Type 2 diabetes mellitus, without long-term current use of insulin [E11.9]  Yes      Resolved Hospital Problems   No resolved problems to display.     Patient is a 84 y.o. female     Fall  Altered mental status  History of stroke 2022 with aphasia  CT age-indeterminate infarct left frontal, chronic infarcts  MRI no acute stroke (discussed with Dr. Cornelius)  B12 and folate okay  Blood cultures no growth 24 hours. Urine okay. Check chest x-ray  She seems to be baseline  See how she does with PT    Acute kidney injury   CKD 3  FOUZIA probably from dehydration.   Creatinine improved stop IVF  Was probably little hemoconcentrated.   Holding ACE inhibitor, torsemide and potassium, metformin    Atrial fibrillation  Apixaban, metoprolol    Diabetes mellitus type 2  Metformin on hold. Currently on correctional insulin  A1c 7.8% in May 2024  Glucoses 100    Hypertension BP fine (low normal) still holding BP meds. Contributing to fall?  Hyperlipidemia atorvastatin    Discharge  TBD  Expected Discharge  Date: 2/21/2025; Expected Discharge Time:     Discussed with patient and nursing staff    Sandor Hansen MD  [facility]  02/17/25 12:37 EST

## 2025-02-17 NOTE — CASE MANAGEMENT/SOCIAL WORK
Discharge Planning Assessment  [facility]     Patient Name: Madelene Naegele  MRN: [id number]  Today's Date: 2/17/2025    Admit Date: 2/15/2025    Plan: CCP following for needs.   BRY Parker RN   Discharge Needs Assessment       Row Name 02/17/25 1022       Living Environment    People in Home alone    Current Living Arrangements home    Potentially Unsafe Housing Conditions none    In the past 12 months has the electric, gas, oil, or water company threatened to shut off services in your home? No    Primary Care Provided by self    Provides Primary Care For no one, unable/limited ability to care for self    Family Caregiver if Needed other (see comments)    Quality of Family Relationships helpful;supportive    Able to Return to Prior Arrangements yes    Living Arrangement Comments Pt lives alone in a single story condo .  Pt does have caregivers from 9:30-4:30.       Resource/Environmental Concerns    Resource/Environmental Concerns none    Transportation Concerns none       Transportation Needs    In the past 12 months, has lack of transportation kept you from medical appointments or from getting medications? no    In the past 12 months, has lack of transportation kept you from meetings, work, or from getting things needed for daily living? No       Food Insecurity    Within the past 12 months, you worried that your food would run out before you got the money to buy more. Never true    Within the past 12 months, the food you bought just didn't last and you didn't have money to get more. Never true       Transition Planning    Patient/Family Anticipates Transition to home with family    Patient/Family Anticipated Services at Transition none    Transportation Anticipated family or friend will provide       Discharge Needs Assessment    Readmission Within the Last 30 Days no previous admission in last 30 days    Equipment Currently Used at Home bath bench;grab bar;rollator;walker, rolling    Concerns to be  Addressed basic needs    Anticipated Changes Related to Illness none    Equipment Needed After Discharge bath bench;grab bar, toilet;grab bar, tub/shower;rollator;walker, rolling    Discharge Facility/Level of Care Needs nursing facility, skilled                   Discharge Plan       Row Name 02/17/25 1024       Plan    Plan CCP following for needs.   BRY Parker RN    Patient/Family in Agreement with Plan yes    Plan Comments FACE SHEET VERIFIED/ IM LETTER SIGNED.  Spoke with pt at bedside.  Called pt's POA and [facility] ( Jovita Ortega [phone]).  Pt's PCP is Dr. Ava Sebastian.  Pt lives alone in a single story condo . Pt does have caregivers from 9:30-4:30. Pt requires assistance with ADLs.  Pt has a bath bench, grab bar, rollator, and rolling walker for home use if needed.  Pt gets her prescriptions at [facility] ([place]  Pt does not have issues affording medications. Pt is not current with . Pt was in [facility] but was not happy there.  CCP following for needs.  BRY Parker RN                  Continued Care and Services - Admitted Since 2/15/2025    No active coordination exists for this encounter.       Expected Discharge Date and Time       Expected Discharge Date Expected Discharge Time    Feb 21, 2025            Demographic Summary       Row Name 02/17/25 1018       General Information    Admission Type inpatient    Arrived From emergency department    Required Notices Provided Important Message from Medicare    Referral Source admission list    Reason for Consult discharge planning    Preferred Language English                   Functional Status       Row Name 02/17/25 1021       Functional Status    Usual Activity Tolerance moderate    Current Activity Tolerance fair       Functional Status, IADL    Medications assistive person    Meal Preparation assistive person    Housekeeping assistive person    Laundry assistive person    Shopping assistive person       Mental Status    General  Appearance WDL WDL                   Psychosocial    No documentation.                  Abuse/Neglect    No documentation.                  Legal    No documentation.                  Substance Abuse    No documentation.                  Patient Forms    No documentation.                     Rachel Parker RN

## 2025-02-18 LAB
ANION GAP SERPL CALCULATED.3IONS-SCNC: 8 MMOL/L (ref 5–15)
BUN SERPL-MCNC: 15 MG/DL (ref 8–23)
BUN/CREAT SERPL: 14 (ref 7–25)
CALCIUM SPEC-SCNC: 8.9 MG/DL (ref 8.6–10.5)
CHLORIDE SERPL-SCNC: 105 MMOL/L (ref 98–107)
CO2 SERPL-SCNC: 24 MMOL/L (ref 22–29)
CREAT SERPL-MCNC: 1.07 MG/DL (ref 0.57–1)
DEPRECATED RDW RBC AUTO: 51.2 FL (ref 37–54)
EGFRCR SERPLBLD CKD-EPI 2021: 51.6 ML/MIN/1.73
ERYTHROCYTE [DISTWIDTH] IN BLOOD BY AUTOMATED COUNT: 12.9 % (ref 12.3–15.4)
GLUCOSE BLDC GLUCOMTR-MCNC: 143 MG/DL (ref 70–130)
GLUCOSE BLDC GLUCOMTR-MCNC: 157 MG/DL (ref 70–130)
GLUCOSE BLDC GLUCOMTR-MCNC: 202 MG/DL (ref 70–130)
GLUCOSE BLDC GLUCOMTR-MCNC: 205 MG/DL (ref 70–130)
GLUCOSE SERPL-MCNC: 156 MG/DL (ref 65–99)
HCT VFR BLD AUTO: 39.2 % (ref 34–46.6)
HGB BLD-MCNC: 13.9 G/DL (ref 12–15.9)
MAGNESIUM SERPL-MCNC: 2.4 MG/DL (ref 1.6–2.4)
MCH RBC QN AUTO: 38.3 PG (ref 26.6–33)
MCHC RBC AUTO-ENTMCNC: 35.5 G/DL (ref 31.5–35.7)
MCV RBC AUTO: 108 FL (ref 79–97)
PHOSPHATE SERPL-MCNC: 3.4 MG/DL (ref 2.5–4.5)
PLATELET # BLD AUTO: 145 10*3/MM3 (ref 140–450)
PMV BLD AUTO: 10.2 FL (ref 6–12)
POTASSIUM SERPL-SCNC: 4.9 MMOL/L (ref 3.5–5.2)
RBC # BLD AUTO: 3.63 10*6/MM3 (ref 3.77–5.28)
SODIUM SERPL-SCNC: 137 MMOL/L (ref 136–145)
WBC NRBC COR # BLD AUTO: 9.12 10*3/MM3 (ref 3.4–10.8)

## 2025-02-18 PROCEDURE — 97110 THERAPEUTIC EXERCISES: CPT

## 2025-02-18 PROCEDURE — 63710000001 INSULIN LISPRO (HUMAN) PER 5 UNITS: Performed by: INTERNAL MEDICINE

## 2025-02-18 PROCEDURE — 85027 COMPLETE CBC AUTOMATED: CPT | Performed by: HOSPITALIST

## 2025-02-18 PROCEDURE — 82948 REAGENT STRIP/BLOOD GLUCOSE: CPT

## 2025-02-18 PROCEDURE — 83735 ASSAY OF MAGNESIUM: CPT | Performed by: HOSPITALIST

## 2025-02-18 PROCEDURE — 80048 BASIC METABOLIC PNL TOTAL CA: CPT | Performed by: HOSPITALIST

## 2025-02-18 PROCEDURE — 97530 THERAPEUTIC ACTIVITIES: CPT

## 2025-02-18 PROCEDURE — 84100 ASSAY OF PHOSPHORUS: CPT | Performed by: HOSPITALIST

## 2025-02-18 RX ADMIN — GABAPENTIN 100 MG: 100 CAPSULE ORAL at 08:48

## 2025-02-18 RX ADMIN — SERTRALINE HYDROCHLORIDE 25 MG: 50 TABLET, FILM COATED ORAL at 08:47

## 2025-02-18 RX ADMIN — DIVALPROEX SODIUM 125 MG: 125 TABLET, DELAYED RELEASE ORAL at 21:38

## 2025-02-18 RX ADMIN — APIXABAN 5 MG: 5 TABLET, FILM COATED ORAL at 21:38

## 2025-02-18 RX ADMIN — DIVALPROEX SODIUM 125 MG: 125 TABLET, DELAYED RELEASE ORAL at 08:47

## 2025-02-18 RX ADMIN — INSULIN LISPRO 2 UNITS: 100 INJECTION, SOLUTION INTRAVENOUS; SUBCUTANEOUS at 21:38

## 2025-02-18 RX ADMIN — POTASSIUM CHLORIDE 40 MEQ: 1500 TABLET, EXTENDED RELEASE ORAL at 08:48

## 2025-02-18 RX ADMIN — INSULIN LISPRO 3 UNITS: 100 INJECTION, SOLUTION INTRAVENOUS; SUBCUTANEOUS at 12:08

## 2025-02-18 RX ADMIN — Medication 2000 UNITS: at 08:48

## 2025-02-18 RX ADMIN — INSULIN LISPRO 3 UNITS: 100 INJECTION, SOLUTION INTRAVENOUS; SUBCUTANEOUS at 17:43

## 2025-02-18 RX ADMIN — ASPIRIN 81 MG: 81 TABLET, COATED ORAL at 08:48

## 2025-02-18 RX ADMIN — DILTIAZEM HYDROCHLORIDE 240 MG: 120 CAPSULE, EXTENDED RELEASE ORAL at 08:47

## 2025-02-18 RX ADMIN — ATORVASTATIN CALCIUM 40 MG: 20 TABLET, FILM COATED ORAL at 08:47

## 2025-02-18 RX ADMIN — ACETAMINOPHEN 325MG 650 MG: 325 TABLET ORAL at 02:53

## 2025-02-18 RX ADMIN — GABAPENTIN 100 MG: 100 CAPSULE ORAL at 21:38

## 2025-02-18 RX ADMIN — APIXABAN 5 MG: 5 TABLET, FILM COATED ORAL at 08:48

## 2025-02-18 RX ADMIN — METOPROLOL SUCCINATE 50 MG: 25 TABLET, EXTENDED RELEASE ORAL at 08:47

## 2025-02-18 NOTE — PLAN OF CARE
Goal Outcome Evaluation:   Pt resting on the bed, AO x 2, no c/o pain, administered meds pert mar, assist to BSC x2, will continue to monitor.      Problem: Adult Inpatient Plan of Care  Goal: Absence of Hospital-Acquired Illness or Injury  Intervention: Identify and Manage Fall Risk  Recent Flowsheet Documentation  Taken 2/18/2025 0608 by Kelly Zavala RN  Safety Promotion/Fall Prevention:   activity supervised   room organization consistent   safety round/check completed  Taken 2/18/2025 0408 by Kelly Zavala RN  Safety Promotion/Fall Prevention:   activity supervised   room organization consistent   safety round/check completed  Taken 2/18/2025 0208 by Kelly Zavala RN  Safety Promotion/Fall Prevention:   activity supervised   room organization consistent   safety round/check completed  Taken 2/18/2025 0010 by Kelly Zavala RN  Safety Promotion/Fall Prevention:   activity supervised   room organization consistent   safety round/check completed  Taken 2/17/2025 2208 by Kelly Zavala RN  Safety Promotion/Fall Prevention:   activity supervised   room organization consistent   safety round/check completed  Taken 2/17/2025 2001 by Kelly Zavala RN  Safety Promotion/Fall Prevention:   activity supervised   room organization consistent   safety round/check completed  Intervention: Prevent Skin Injury  Recent Flowsheet Documentation  Taken 2/18/2025 0608 by Kelly Zavala RN  Body Position: position changed independently  Taken 2/18/2025 0408 by Kelly Zavala RN  Body Position: position changed independently  Taken 2/18/2025 0208 by Kelly Zavala RN  Body Position: position changed independently  Taken 2/18/2025 0010 by Kelly Zavala RN  Body Position: position changed independently  Skin Protection: incontinence pads utilized  Taken 2/17/2025 2208 by Kelly Zavala RN  Body Position: position changed independently  Taken 2/17/2025 2001 by Kelly Zavala RN  Body  Position: position changed independently  Skin Protection: incontinence pads utilized  Intervention: Prevent Infection  Recent Flowsheet Documentation  Taken 2/18/2025 0608 by Kelly Zavala RN  Infection Prevention:   hand hygiene promoted   rest/sleep promoted  Taken 2/18/2025 0408 by Kelly Zavala RN  Infection Prevention:   hand hygiene promoted   rest/sleep promoted  Taken 2/18/2025 0208 by Kelly Zavala RN  Infection Prevention:   hand hygiene promoted   personal protective equipment utilized   rest/sleep promoted  Taken 2/18/2025 0010 by Kelly Zavala RN  Infection Prevention:   hand hygiene promoted   rest/sleep promoted  Taken 2/17/2025 2208 by Kelly Zavala RN  Infection Prevention:   hand hygiene promoted   rest/sleep promoted  Taken 2/17/2025 2001 by Kelly Zavala RN  Infection Prevention:   hand hygiene promoted   single patient room provided   rest/sleep promoted  Goal: Optimal Comfort and Wellbeing  Intervention: Provide Person-Centered Care  Recent Flowsheet Documentation  Taken 2/18/2025 0010 by Kelly Zavala RN  Trust Relationship/Rapport:   care explained   choices provided  Taken 2/17/2025 2001 by Kelly Zavala RN  Trust Relationship/Rapport:   care explained   choices provided     Problem: Comorbidity Management  Goal: Blood Glucose Level Within Target Range  Intervention: Monitor and Manage Glycemia  Recent Flowsheet Documentation  Taken 2/18/2025 0608 by Kelly Zavala RN  Medication Review/Management: medications reviewed  Taken 2/18/2025 0408 by Kelly Zavala RN  Medication Review/Management: medications reviewed  Taken 2/18/2025 0208 by Kelly Zavala RN  Medication Review/Management: medications reviewed  Taken 2/18/2025 0010 by Kelly Zavala RN  Medication Review/Management: medications reviewed  Taken 2/17/2025 2208 by Kelly Zavala RN  Medication Review/Management: medications reviewed  Taken 2/17/2025 2001 by Lucas  ZORAIDA Mendoza  Medication Review/Management: medications reviewed     Problem: Fall Injury Risk  Goal: Absence of Fall and Fall-Related Injury  Intervention: Identify and Manage Contributors  Recent Flowsheet Documentation  Taken 2/18/2025 0608 by Kelly Zavala RN  Medication Review/Management: medications reviewed  Taken 2/18/2025 0408 by Kelly Zavala RN  Medication Review/Management: medications reviewed  Taken 2/18/2025 0208 by Kelly Zavala RN  Medication Review/Management: medications reviewed  Taken 2/18/2025 0010 by Kelly Zavala RN  Medication Review/Management: medications reviewed  Taken 2/17/2025 2208 by Kelly Zavala RN  Medication Review/Management: medications reviewed  Taken 2/17/2025 2001 by Kelly Zavala RN  Medication Review/Management: medications reviewed  Intervention: Promote Injury-Free Environment  Recent Flowsheet Documentation  Taken 2/18/2025 0608 by Kelly Zavala RN  Safety Promotion/Fall Prevention:   activity supervised   room organization consistent   safety round/check completed  Taken 2/18/2025 0408 by Kelly Zavala RN  Safety Promotion/Fall Prevention:   activity supervised   room organization consistent   safety round/check completed  Taken 2/18/2025 0208 by Kelly Zavala RN  Safety Promotion/Fall Prevention:   activity supervised   room organization consistent   safety round/check completed  Taken 2/18/2025 0010 by Kelly Zavala RN  Safety Promotion/Fall Prevention:   activity supervised   room organization consistent   safety round/check completed  Taken 2/17/2025 2208 by Kelly Zavala RN  Safety Promotion/Fall Prevention:   activity supervised   room organization consistent   safety round/check completed  Taken 2/17/2025 2001 by Kelly Zavala RN  Safety Promotion/Fall Prevention:   activity supervised   room organization consistent   safety round/check completed

## 2025-02-18 NOTE — THERAPY TREATMENT NOTE
Patient Name: Madelene Naegele  : 1940    MRN: [id number]                              Today's Date: 2025       Admit Date: 2/15/2025    Visit Dx:     ICD-10-CM ICD-9-CM   1. Acute encephalopathy  G93.40 348.30   2. Acute kidney injury  N17.9 584.9   3. Abnormal head CT  R93.0 793.0   4. Fall, initial encounter  W19.XXXA E888.9   5. Chronic anticoagulation  Z79.01 V58.61   6. Laceration of right hand without foreign body, initial encounter  S61.411A 882.0     Patient Active Problem List   Diagnosis    Cellulitis of left leg    Severe sepsis    HTN (hypertension)    Atrial fibrillation, persistent    Hyperlipidemia    History of stroke    CAD (coronary artery disease)    Type 2 diabetes mellitus, without long-term current use of insulin    Acute encephalopathy    Altered mental status    CKD (chronic kidney disease) stage 3, GFR 30-59 ml/min     Past Medical History:   Diagnosis Date    CAD (coronary artery disease)     History of stroke     Hyperlipidemia     Hypertension     Persistent atrial fibrillation     Type 2 diabetes mellitus      History reviewed. No pertinent surgical history.   General Information       Row Name 25 1129          Physical Therapy Time and Intention    Document Type therapy note (daily note)  -EB     Mode of Treatment individual therapy;physical therapy  -EB       Row Name 25 1129          General Information    Patient Profile Reviewed yes  -EB     Existing Precautions/Restrictions fall  -EB       Row Name 25 1129          Cognition    Orientation Status (Cognition) oriented to;person;place;situation  -EB       Row Name 25 1129          Safety Issues/Impairments Affecting Functional Mobility    Safety Issues Affecting Function (Mobility) problem-solving;sequencing abilities  -EB     Impairments Affecting Function (Mobility) balance;endurance/activity tolerance;strength  -EB               User Key  (r) = Recorded By, (t) = Taken By, (c) = Cosigned By       Initials Name Provider Type    Cassi Issa PTA Physical Therapist Assistant                   Mobility       Row Name 02/18/25 1130          Bed Mobility    Bed Mobility supine-sit  -EB     Supine-Sit [place] (Bed Mobility) minimum assist (75% patient effort);verbal cues;nonverbal cues (demo/gesture)  -EB     Assistive Device (Bed Mobility) bed rails;head of bed elevated  -EB     Comment, (Bed Mobility) increased time needed  -EB       Row Name 02/18/25 1130          Sit-Stand Transfer    Sit-Stand [place] (Transfers) minimum assist (75% patient effort);verbal cues  -EB     Assistive Device (Sit-Stand Transfers) walker, front-wheeled  -EB     Comment, (Sit-Stand Transfer) cues for hand placment  -EB       Row Name 02/18/25 1130          Gait/Stairs (Locomotion)    [place] Level (Gait) contact guard  -EB     Assistive Device (Gait) walker, front-wheeled  -EB     Distance in Feet (Gait) 8  -EB     Deviations/Abnormal Patterns (Gait) stride length decreased;gait speed decreased;kelsey decreased  -EB     Bilateral Gait Deviations forward flexed posture;heel strike decreased  -EB     Comment, (Gait/Stairs) cues for upright posture. Pt declined further gait despite encouragement, pt fatigued.  -EB               User Key  (r) = Recorded By, (t) = Taken By, (c) = Cosigned By      Initials Name Provider Type    Cassi Issa PTA Physical Therapist Assistant                   Obj/Interventions       Row Name 02/18/25 1131          Motor Skills    Therapeutic Exercise --  BLE: LAQs and seated marches  (X10)  -EB               User Key  (r) = Recorded By, (t) = Taken By, (c) = Cosigned By      Initials Name Provider Type    Cassi Issa PTA Physical Therapist Assistant                   Goals/Plan    No documentation.                  Clinical Impression       Row Name 02/18/25 1131          Pain    Pretreatment Pain Rating 0/10 - no pain  -EB     Posttreatment Pain Rating 0/10 - no pain  -EB        Row Name 02/18/25 1131          Plan of Care Review    Plan of Care Reviewed With patient  -EB     Progress improving  -EB     Outcome Evaluation Pt seen for PT tx today. Pt agreeable to participate needing some encouragement. Pt required increased time to complete mobility tasks and sequencing cues provided. Pt is Evelyne with supine to sit and Evelyne for transfers. Pt ambulated 8ft with CGA/rwx. Pt had fairly good balance today but declined to attempt further gait d/t fatigue. Pt UIC at the end of tx session. Pt will need SNF at d/c to improve strength, activity tolerance and overall functional mobility. Will continue to follow and progress as able.  -EB       Row Name 02/18/25 1131          Therapy Assessment/Plan (PT)    Therapy Frequency (PT) 5 times/wk  -EB       Row Name 02/18/25 1131          Positioning and Restraints    Pre-Treatment Position in bed  -EB     Post Treatment Position chair  -EB     In Chair reclined;call light within reach;encouraged to call for assist;exit alarm on  -EB               User Key  (r) = Recorded By, (t) = Taken By, (c) = Cosigned By      Initials Name Provider Type    Cassi Issa PTA Physical Therapist Assistant                   Outcome Measures       Row Name 02/18/25 1134 02/18/25 0815       How much help from another person do you currently need...    Turning from your back to your side while in flat bed without using bedrails? 3  -EB 3  -JK    Moving from lying on back to sitting on the side of a flat bed without bedrails? 3  -EB 3  -JK    Moving to and from a bed to a chair (including a wheelchair)? 3  -EB 3  -JK    Standing up from a chair using your arms (e.g., wheelchair, bedside chair)? 3  -EB 3  -JK    Climbing 3-5 steps with a railing? 2  -EB 2  -JK    To walk in hospital room? 3  -EB 2  -JK    AM-PAC 6 Clicks Score (PT) 17  -EB 16  -JK    Highest Level of Mobility Goal 5 --> Static standing  -EB 5 --> Static standing  -JK              User Key  (r) =  Recorded By, (t) = Taken By, (c) = Cosigned By      Initials Name Provider Type    Cassi Issa PTA Physical Therapist Assistant    Rosibel Patterson RN Registered Nurse                                 Physical Therapy Education       Title: PT OT SLP Therapies (In Progress)       Topic: Physical Therapy (Not Started)       Point: Mobility training (Not Started)       Learner Progress:  Not documented in this visit.              Point: Home exercise program (Not Started)       Learner Progress:  Not documented in this visit.              Point: Body mechanics (Not Started)       Learner Progress:  Not documented in this visit.              Point: Precautions (Not Started)       Learner Progress:  Not documented in this visit.                                  PT Recommendation and Plan     Progress: improving  Outcome Evaluation: Pt seen for PT tx today. Pt agreeable to participate needing some encouragement. Pt required increased time to complete mobility tasks and sequencing cues provided. Pt is Evelyne with supine to sit and Evelyne for transfers. Pt ambulated 8ft with CGA/rwx. Pt had fairly good balance today but declined to attempt further gait d/t fatigue. Pt UIC at the end of tx session. Pt will need SNF at d/c to improve strength, activity tolerance and overall functional mobility. Will continue to follow and progress as able.     Time Calculation:         PT Charges       Row Name 02/18/25 1135             Time Calculation    Start Time 0922  -EB      Stop Time 0946  -EB      Time Calculation (min) 24 min  -EB      PT Received On 02/18/25  -EB      PT - Next Appointment 02/19/25  -EB         Time Calculation- PT    Total Timed Code Minutes- PT 24 minute(s)  -EB                User Key  (r) = Recorded By, (t) = Taken By, (c) = Cosigned By      Initials Name Provider Type    Cassi Issa PTA Physical Therapist Assistant                  Therapy Charges for Today       Code Description Service Date  Service Provider Modifiers Qty    98403653511 HC PT THER PROC EA 15 MIN 2/18/2025 Cassi Bailey, AYDEE GP 1    97133687097 HC PT THERAPEUTIC ACT EA 15 MIN 2/18/2025 Cassi Bailey, AYDEE GP 1            PT G-Codes  Outcome Measure Options: AM-PAC 6 Clicks Basic Mobility (PT)  AM-PAC 6 Clicks Score (PT): 17  AM-PAC 6 Clicks Score (OT): 18       Cassi Bailey PTA  2/18/2025

## 2025-02-18 NOTE — PROGRESS NOTES
Name: Madelene Naegele ADMIT: 2/15/2025   : 1940  PCP: Ava Sebastian MD    MRN: [id number] LOS: 2 days   AGE/SEX: 84 y.o. female  ROOM: [facility]     Subjective   Subjective   No new complaints. Caregiver at bedside states mental status is baseline.     Objective   Objective   Vital Signs  Temp:  [97.7 °F (36.5 °C)-99.1 °F (37.3 °C)] 97.7 °F (36.5 °C)  Heart Rate:  [] 67  Resp:  [18] 18  BP: (102-127)/(59-86) 102/74  SpO2:  [97 %-100 %] 98 %  on   ;   Device (Oxygen Therapy): room air  Body mass index is 26.47 kg/m².    Physical Exam  Constitutional:       General: She is not in acute distress.     Appearance: She is not toxic-appearing.   HENT:      Head: Normocephalic and atraumatic.   Cardiovascular:      Rate and Rhythm: Normal rate and regular rhythm.   Pulmonary:      Effort: Pulmonary effort is normal. No respiratory distress.      Breath sounds: Normal breath sounds. No wheezing or rhonchi.   Abdominal:      General: Bowel sounds are normal.      Palpations: Abdomen is soft.      Tenderness: There is no abdominal tenderness. There is no guarding or rebound.   Musculoskeletal:         General: No swelling.   Skin:     General: Skin is warm and dry.   Neurological:      Mental Status: She is alert and oriented to person, place, and time. Mental status is at baseline.      Cranial Nerves: No facial asymmetry.   Psychiatric:         Mood and Affect: Mood normal.         Behavior: Behavior normal.     Results Review  I reviewed the patient's new clinical results.  Results from last 7 days   Lab Units 25  0600 25  0558 25  0602 02/15/25  1744   WBC 10*3/mm3 9.12 10.18 9.91 11.96*   HEMOGLOBIN g/dL 13.9 13.7 14.8 16.3*   PLATELETS 10*3/mm3 145 135* 143 170     Results from last 7 days   Lab Units 25  0600 25  0558 25  0602 02/15/25  1744   SODIUM mmol/L 137 139 140 138   POTASSIUM mmol/L 4.9 4.5 4.0 4.4   CHLORIDE mmol/L 105 102 103 101   CO2 mmol/L 24.0 27.1 27.0  24.0   BUN mg/dL 15 24* 24* 27*   CREATININE mg/dL 1.07* 1.17* 1.20* 1.38*   GLUCOSE mg/dL 156* 164* 158* 198*     Lab Results   Component Value Date    ANIONGAP 8.0 02/18/2025     Estimated Creatinine Clearance: 35.4 mL/min (A) (by C-G formula based on SCr of 1.07 mg/dL (H)).   Lab Results   Component Value Date    EGFR 51.6 (L) 02/18/2025     Results from last 7 days   Lab Units 02/15/25  1744   ALBUMIN g/dL 4.0   BILIRUBIN mg/dL 0.9   ALK PHOS U/L 62   AST (SGOT) U/L 16   ALT (SGPT) U/L 12     Results from last 7 days   Lab Units 02/18/25  0600 02/17/25  0558 02/16/25  0602 02/15/25  1744   CALCIUM mg/dL 8.9 8.8 8.9 9.3   ALBUMIN g/dL  --   --   --  4.0   MAGNESIUM mg/dL 2.4 2.0  --  1.8   PHOSPHORUS mg/dL 3.4 3.0  --   --      Results from last 7 days   Lab Units 02/15/25  2048 02/15/25  1744   LACTATE mmol/L 1.6 2.9*     Glucose   Date/Time Value Ref Range Status   02/18/2025 1042 205 (H) 70 - 130 mg/dL Final   02/18/2025 0634 143 (H) 70 - 130 mg/dL Final   02/17/2025 2034 171 (H) 70 - 130 mg/dL Final   02/17/2025 1544 252 (H) 70 - 130 mg/dL Final   02/17/2025 1042 134 (H) 70 - 130 mg/dL Final   02/17/2025 0637 154 (H) 70 - 130 mg/dL Final   02/16/2025 2055 199 (H) 70 - 130 mg/dL Final       MRI Brain Without Contrast    Result Date: 2/18/2025  1. No acute intracranial abnormality is identified with no acute infarct identified. 2. Since the prior outside MRI of the brain from [facility] on 05/24/2024 the acute infarct extending from the inferior lateral left parietal lobe into the superior lateral left temporal occipital region has evolved to a chronic infarct measuring 4 x 3 x 4 cm in size and there are areas of hemosiderin deposition methemoglobin deposition from old petechial hemorrhage into this chronic infarct in the distribution of the temporal parietal occipital branches of the left MCA territory. There is also a chronic infarct in the inferior lateral left frontal lobe that measures 2 x 1.1  x 1.2 cm also in the left MCA territory that is unchanged since outside MRI of the brain from [facility] on 05/24/2022. There are multiple tiny chronic infarcts in the cerebellar hemispheres bilaterally in the PICA territories as described above and there is mild-moderate small vessel disease in cerebral white matter. There is an 11 x 8 x 12 mm old lacunar infarct extending from the anterior mid right corona radiata region into the superior right putamen. There is mild cerebral atrophy. Lens implants are in place in the globes from previous bilateral cataract surgery. 3. The remainder of the MRI of the brain is normal. Again, specifically no acute infarct is identified. In particular, the inferior lateral left frontal cortical infarct is clearly a chronic infarct and unchanged since outside MRI of the brain from [facility] on 05/24/2022.   This report was finalized on 2/18/2025 11:54 AM by Dr. Presley Cornelius M.D on Workstation: [id number]      XR Chest 1 View    Result Date: 2/17/2025  Cardiomegaly. No interval change or evidence for active disease in the chest.   This report was finalized on 2/17/2025 2:37 PM by Michael Seals M.D on Workstation: [id number]       Scheduled Meds  apixaban, 5 mg, Oral, BID  aspirin, 81 mg, Oral, Daily  atorvastatin, 40 mg, Oral, Daily  cholecalciferol, 2,000 Units, Oral, Daily  dilTIAZem CD, 240 mg, Oral, Q24H  divalproex, 125 mg, Oral, BID  gabapentin, 100 mg, Oral, BID  insulin lispro, 2-7 Units, Subcutaneous, 4x Daily AC & at Bedtime  metoprolol succinate XL, 50 mg, Oral, Daily  potassium chloride, 40 mEq, Oral, Daily  sertraline, 25 mg, Oral, Daily    Continuous Infusions     PRN Meds    acetaminophen **OR** acetaminophen **OR** acetaminophen    senna-docusate sodium **AND** polyethylene glycol **AND** bisacodyl **AND** bisacodyl    dextrose    dextrose    glucagon (human recombinant)    hydrOXYzine    melatonin    ondansetron ODT **OR**  ondansetron    [COMPLETED] Insert Peripheral IV **AND** sodium chloride       Diet  Diet: Cardiac; Healthy Heart (2-3 Na+); Fluid Consistency: Thin (IDDSI 0)       Assessment/Plan     Active Hospital Problems    Diagnosis  POA    **Acute encephalopathy [G93.40]  Yes    CKD (chronic kidney disease) stage 3, GFR 30-59 ml/min [N18.30]  Unknown    Altered mental status [R41.82]  Yes    Atrial fibrillation, persistent [I48.19]  Yes    CAD (coronary artery disease) [I25.10]  Yes    History of stroke [Z86.73]  Not Applicable    HTN (hypertension) [I10]  Yes    Hyperlipidemia [E78.5]  Yes    Type 2 diabetes mellitus, without long-term current use of insulin [E11.9]  Yes      Resolved Hospital Problems   No resolved problems to display.     Patient is a 84 y.o. female     Fall  Altered mental status  History of stroke 2022 with aphasia  CT age-indeterminate infarct left frontal, chronic infarcts  MRI no acute stroke   B12 and folate okay  Blood cultures no growth 24 hours. Urine okay. Check chest x-ray  She seems to be baseline  PT recommends SNF    Acute kidney injury   CKD 3  FOUZIA probably from dehydration.   Creatinine improved close to baseline now off IVF  Was probably little hemoconcentrated.   Holding ACE inhibitor, torsemide and potassium, metformin    Atrial fibrillation  Apixaban, metoprolol    Diabetes mellitus type 2  Metformin on hold. Currently on correctional insulin  A1c 7.8% in May 2024  Glucoses 100    Hypertension BP fine off medications. May have had low BP contributing to falls-check orthostatics  Hyperlipidemia atorvastatin    Discharge  Plan for SNF  Expected Discharge Date: 2/21/2025; Expected Discharge Time:     Discussed with patient and nursing staff    Sandor Hansen MD  [facility]  02/18/25 14:18 EST

## 2025-02-18 NOTE — PLAN OF CARE
Goal Outcome Evaluation:  Plan of Care Reviewed With: patient        Progress: improving  Outcome Evaluation: Pt seen for PT tx today. Pt agreeable to participate needing some encouragement. Pt required increased time to complete mobility tasks and sequencing cues provided. Pt is Evelyne with supine to sit and Evelyne for transfers. Pt ambulated 8ft with CGA/rwx. Pt had fairly good balance today but declined to attempt further gait d/t fatigue. Pt UIC at the end of tx session. Pt will need SNF at d/c to improve strength, activity tolerance and overall functional mobility. Will continue to follow and progress as able.

## 2025-02-19 LAB
ANION GAP SERPL CALCULATED.3IONS-SCNC: 7 MMOL/L (ref 5–15)
BUN SERPL-MCNC: 21 MG/DL (ref 8–23)
BUN/CREAT SERPL: 17.4 (ref 7–25)
CALCIUM SPEC-SCNC: 8.8 MG/DL (ref 8.6–10.5)
CHLORIDE SERPL-SCNC: 107 MMOL/L (ref 98–107)
CO2 SERPL-SCNC: 24 MMOL/L (ref 22–29)
CREAT SERPL-MCNC: 1.21 MG/DL (ref 0.57–1)
DEPRECATED RDW RBC AUTO: 52.3 FL (ref 37–54)
EGFRCR SERPLBLD CKD-EPI 2021: 44.6 ML/MIN/1.73
ERYTHROCYTE [DISTWIDTH] IN BLOOD BY AUTOMATED COUNT: 12.9 % (ref 12.3–15.4)
GLUCOSE BLDC GLUCOMTR-MCNC: 106 MG/DL (ref 70–130)
GLUCOSE BLDC GLUCOMTR-MCNC: 139 MG/DL (ref 70–130)
GLUCOSE BLDC GLUCOMTR-MCNC: 175 MG/DL (ref 70–130)
GLUCOSE BLDC GLUCOMTR-MCNC: 211 MG/DL (ref 70–130)
GLUCOSE SERPL-MCNC: 104 MG/DL (ref 65–99)
HCT VFR BLD AUTO: 40.8 % (ref 34–46.6)
HGB BLD-MCNC: 13.6 G/DL (ref 12–15.9)
MAGNESIUM SERPL-MCNC: 2.2 MG/DL (ref 1.6–2.4)
MCH RBC QN AUTO: 36.6 PG (ref 26.6–33)
MCHC RBC AUTO-ENTMCNC: 33.3 G/DL (ref 31.5–35.7)
MCV RBC AUTO: 109.7 FL (ref 79–97)
PHOSPHATE SERPL-MCNC: 3.3 MG/DL (ref 2.5–4.5)
PLATELET # BLD AUTO: 147 10*3/MM3 (ref 140–450)
PMV BLD AUTO: 10.8 FL (ref 6–12)
POTASSIUM SERPL-SCNC: 5.4 MMOL/L (ref 3.5–5.2)
RBC # BLD AUTO: 3.72 10*6/MM3 (ref 3.77–5.28)
SODIUM SERPL-SCNC: 138 MMOL/L (ref 136–145)
WBC NRBC COR # BLD AUTO: 8.02 10*3/MM3 (ref 3.4–10.8)

## 2025-02-19 PROCEDURE — 97110 THERAPEUTIC EXERCISES: CPT

## 2025-02-19 PROCEDURE — 80048 BASIC METABOLIC PNL TOTAL CA: CPT | Performed by: HOSPITALIST

## 2025-02-19 PROCEDURE — 83735 ASSAY OF MAGNESIUM: CPT | Performed by: HOSPITALIST

## 2025-02-19 PROCEDURE — 82948 REAGENT STRIP/BLOOD GLUCOSE: CPT

## 2025-02-19 PROCEDURE — 63710000001 INSULIN LISPRO (HUMAN) PER 5 UNITS: Performed by: INTERNAL MEDICINE

## 2025-02-19 PROCEDURE — 97530 THERAPEUTIC ACTIVITIES: CPT

## 2025-02-19 PROCEDURE — 84100 ASSAY OF PHOSPHORUS: CPT | Performed by: HOSPITALIST

## 2025-02-19 PROCEDURE — 85027 COMPLETE CBC AUTOMATED: CPT | Performed by: HOSPITALIST

## 2025-02-19 RX ORDER — TORSEMIDE 20 MG/1
20 TABLET ORAL DAILY
Status: DISCONTINUED | OUTPATIENT
Start: 2025-02-19 | End: 2025-02-21

## 2025-02-19 RX ADMIN — GABAPENTIN 100 MG: 100 CAPSULE ORAL at 20:20

## 2025-02-19 RX ADMIN — DILTIAZEM HYDROCHLORIDE 240 MG: 120 CAPSULE, EXTENDED RELEASE ORAL at 09:32

## 2025-02-19 RX ADMIN — APIXABAN 5 MG: 5 TABLET, FILM COATED ORAL at 20:20

## 2025-02-19 RX ADMIN — SERTRALINE HYDROCHLORIDE 25 MG: 50 TABLET, FILM COATED ORAL at 09:33

## 2025-02-19 RX ADMIN — INSULIN LISPRO 2 UNITS: 100 INJECTION, SOLUTION INTRAVENOUS; SUBCUTANEOUS at 21:42

## 2025-02-19 RX ADMIN — GABAPENTIN 100 MG: 100 CAPSULE ORAL at 09:32

## 2025-02-19 RX ADMIN — ASPIRIN 81 MG: 81 TABLET, COATED ORAL at 09:32

## 2025-02-19 RX ADMIN — POTASSIUM CHLORIDE 40 MEQ: 1500 TABLET, EXTENDED RELEASE ORAL at 09:32

## 2025-02-19 RX ADMIN — Medication 2000 UNITS: at 09:32

## 2025-02-19 RX ADMIN — DIVALPROEX SODIUM 125 MG: 125 TABLET, DELAYED RELEASE ORAL at 20:20

## 2025-02-19 RX ADMIN — ATORVASTATIN CALCIUM 40 MG: 20 TABLET, FILM COATED ORAL at 09:33

## 2025-02-19 RX ADMIN — INSULIN LISPRO 3 UNITS: 100 INJECTION, SOLUTION INTRAVENOUS; SUBCUTANEOUS at 12:09

## 2025-02-19 RX ADMIN — METOPROLOL SUCCINATE 50 MG: 25 TABLET, EXTENDED RELEASE ORAL at 09:32

## 2025-02-19 RX ADMIN — TORSEMIDE 20 MG: 20 TABLET ORAL at 17:31

## 2025-02-19 RX ADMIN — DIVALPROEX SODIUM 125 MG: 125 TABLET, DELAYED RELEASE ORAL at 09:32

## 2025-02-19 RX ADMIN — APIXABAN 5 MG: 5 TABLET, FILM COATED ORAL at 09:32

## 2025-02-19 NOTE — PLAN OF CARE
Goal Outcome Evaluation:  Plan of Care Reviewed With: patient, caregiver        Progress: improving  Outcome Evaluation: Pt was seen for PT tx this AM. Pt was in BR at beg of session. Pt performed toileting hygiene IND. Pt stood w/ CGA. Pt amb 20' to chair w/ CGA and use of fww. Pt lacked safety awareness letting go of fww a few times and also attempting to complete amb when close to chair w/o use of fww. Pt performed ther ex x 12-15 reps and was UIC w/ caregiver at end of session.    Anticipated Discharge Disposition (PT): skilled nursing facility, home with home health, home with 24/7 care

## 2025-02-19 NOTE — PLAN OF CARE
Goal Outcome Evaluation:      Pt. Alert, oriented x2, confused, v/s stable, 02 sats 94% on room air, no voiced c/o pain, with s/t to right hand with dressing intact, no s/s acute distress noted

## 2025-02-19 NOTE — PROGRESS NOTES
Name: Madelene Naegele ADMIT: 2/15/2025   : 1940  PCP: Ava Sebastian MD    MRN: [id number] LOS: 3 days   AGE/SEX: 84 y.o. female  ROOM: [facility]     Subjective   Subjective   No new complaints.  Caregiver at bedside..     Objective   Objective   Vital Signs  Temp:  [97.5 °F (36.4 °C)-98.4 °F (36.9 °C)] 98.2 °F (36.8 °C)  Heart Rate:  [] 83  Resp:  [16-18] 18  BP: (100-121)/(68-92) 100/70  SpO2:  [96 %-99 %] 98 %  on   ;   Device (Oxygen Therapy): room air  Body mass index is 28.79 kg/m².    Physical Exam  Constitutional:       General: She is not in acute distress.     Appearance: She is not toxic-appearing.   Cardiovascular:      Rate and Rhythm: Normal rate. Rhythm irregular.   Pulmonary:      Effort: Pulmonary effort is normal.      Breath sounds: Wheezing present.   Abdominal:      General: Bowel sounds are normal.      Palpations: Abdomen is soft.      Tenderness: There is no abdominal tenderness.   Musculoskeletal:         General: No swelling.   Skin:     General: Skin is warm and dry.   Neurological:      Mental Status: She is alert. Mental status is at baseline.      Cranial Nerves: No facial asymmetry.     Results Review  I reviewed the patient's new clinical results.  Results from last 7 days   Lab Units 25  0443 25  0600 25  0558 25  0602   WBC 10*3/mm3 8.02 9.12 10.18 9.91   HEMOGLOBIN g/dL 13.6 13.9 13.7 14.8   PLATELETS 10*3/mm3 147 145 135* 143     Results from last 7 days   Lab Units 25  0442 25  0600 25  0558 25  0602 02/15/25  1744   SODIUM mmol/L 138 137 139 140 138   POTASSIUM mmol/L 5.4* 4.9 4.5 4.0 4.4   CHLORIDE mmol/L 107 105 102 103 101   CO2 mmol/L 24.0 24.0 27.1 27.0 24.0   BUN mg/dL 21 15 24* 24* 27*   CREATININE mg/dL 1.21* 1.07* 1.17* 1.20* 1.38*   GLUCOSE mg/dL 104* 156* 164* 158* 198*   EGFR mL/min/1.73 44.6* 51.6* 46.4* 45.0* 38.1*   ALBUMIN g/dL  --   --   --   --  4.0   BILIRUBIN mg/dL  --   --   --   --  0.9   ALK  PHOS U/L  --   --   --   --  62   AST (SGOT) U/L  --   --   --   --  16   ALT (SGPT) U/L  --   --   --   --  12     Results from last 7 days   Lab Units 02/19/25  0442 02/18/25  0600 02/17/25  0558 02/16/25  0602 02/15/25  1744   CALCIUM mg/dL 8.8 8.9 8.8 8.9 9.3   ALBUMIN g/dL  --   --   --   --  4.0   MAGNESIUM mg/dL 2.2 2.4 2.0  --  1.8   PHOSPHORUS mg/dL 3.3 3.4 3.0  --   --      Results from last 7 days   Lab Units 02/15/25  2048 02/15/25  1744   LACTATE mmol/L 1.6 2.9*     Glucose   Date/Time Value Ref Range Status   02/19/2025 1105 211 (H) 70 - 130 mg/dL Final   02/19/2025 0615 106 70 - 130 mg/dL Final   02/18/2025 2112 157 (H) 70 - 130 mg/dL Final   02/18/2025 1532 202 (H) 70 - 130 mg/dL Final   02/18/2025 1042 205 (H) 70 - 130 mg/dL Final   02/18/2025 0634 143 (H) 70 - 130 mg/dL Final   02/17/2025 2034 171 (H) 70 - 130 mg/dL Final       XR Chest 1 View    Result Date: 2/17/2025  Cardiomegaly. No interval change or evidence for active disease in the chest.   This report was finalized on 2/17/2025 2:37 PM by Michael Seals M.D on Workstation: [id number]       Scheduled Meds  apixaban, 5 mg, Oral, BID  aspirin, 81 mg, Oral, Daily  atorvastatin, 40 mg, Oral, Daily  cholecalciferol, 2,000 Units, Oral, Daily  dilTIAZem CD, 240 mg, Oral, Q24H  divalproex, 125 mg, Oral, BID  gabapentin, 100 mg, Oral, BID  insulin lispro, 2-7 Units, Subcutaneous, 4x Daily AC & at Bedtime  metoprolol succinate XL, 50 mg, Oral, Daily  potassium chloride, 40 mEq, Oral, Daily  sertraline, 25 mg, Oral, Daily    Continuous Infusions     PRN Meds    acetaminophen **OR** acetaminophen **OR** acetaminophen    senna-docusate sodium **AND** polyethylene glycol **AND** bisacodyl **AND** bisacodyl    dextrose    dextrose    glucagon (human recombinant)    hydrOXYzine    melatonin    ondansetron ODT **OR** ondansetron    [COMPLETED] Insert Peripheral IV **AND** sodium chloride       Diet  Diet: Cardiac; Healthy Heart (2-3 Na+); Fluid  Consistency: Thin (IDDSI 0)       Assessment/Plan     Active Hospital Problems    Diagnosis  POA    **Acute encephalopathy [G93.40]  Yes    CKD (chronic kidney disease) stage 3, GFR 30-59 ml/min [N18.30]  Unknown    Altered mental status [R41.82]  Yes    Atrial fibrillation, persistent [I48.19]  Yes    CAD (coronary artery disease) [I25.10]  Yes    History of stroke [Z86.73]  Not Applicable    HTN (hypertension) [I10]  Yes    Hyperlipidemia [E78.5]  Yes    Type 2 diabetes mellitus, without long-term current use of insulin [E11.9]  Yes      Resolved Hospital Problems   No resolved problems to display.       84 y.o. female with Acute encephalopathy.    AFVSS  BP soft 100/70  Room air 100%  CBC normal x 2 days  Potassium 5.4  Creatinine stable 1.21  Blood sugars reasonably controlled      Fall  Altered mental status  History of stroke 2022 with aphasia  CT age-indeterminate infarct left frontal, chronic infarcts  MRI no acute stroke   B12 and folate okay  Blood cultures no growth 24 hours. Urine okay. Check chest x-ray  She seems to be baseline  PT recommends SNF    NO evidence of FOUZIA  CKD 3a  FOUZIA ruled out, creatinine near baseline  Was probably little hemoconcentrated.   BP remains soft therefore continue to hold ACE  Potassium a little generous.  She seems to be tolerating a regular diet.  Will restart home torsemide and reassess labs in AM.    Atrial fibrillation  Per med rec takes diltiazem ER but does NOT take Toprol.  Will have pharmacy verify.  Continue Eliquis    Diabetes mellitus type 2  Metformin on hold.   Continue correctional insulin  A1c 7.8% in May 2024    Hypertension   BP fine off medications. May have had low BP contributing to falls-check orthostatics      SCDs for DVT prophylaxis.  Full code.  Discussed with patient and care team on multidisciplinary rounds.  Anticipate discharge to SNU facility once arrangements have been made.  Expected Discharge Date: 2/21/2025; Expected Discharge Time:        Dave Silva MD  [facility]  02/19/25  12:26 EST

## 2025-02-19 NOTE — DISCHARGE PLACEMENT REQUEST
"Naegele, Madelene (84 y.o. Female)       Date of Birth   1940    Social Security Number       Address   [address] [place] [address]    Home Phone       MRN   [id number]       Tenriism   Yarsanism    Marital Status   Single                            Admission Date   2/15/25    Admission Type   Emergency    Admitting Provider   Mackenzie Garza MD    Attending Provider   Dave Silva MD    Department, Room/Bed   [address], E655/1       Discharge Date       Discharge Disposition       Discharge Destination                                 Attending Provider: Dave Silva MD    Allergies: No Known Allergies    Isolation: None   Infection: None   Code Status: CPR    Ht: 157.5 cm (62\")   Wt: 71.4 kg (157 lb 6.5 oz)    Admission Cmt: None   Principal Problem: Acute encephalopathy [G93.40]                   Active Insurance as of 2/15/2025       Primary Coverage       Payor Plan Insurance Group Employer/Plan Group    [facility] MEDICARE REPLACEMENT [facility] MED ADV GROUP [id number]       Payor Plan Address Payor Plan Phone Number Payor Plan Fax Number Effective Dates    [address] [address]   1/1/2024 - None Entered    [facility] [id number]         Subscriber Name Subscriber Birth Date Member ID       NAEGELE,MADELENE 1940 [id number]                     Emergency Contacts        (Rel.) Home Phone Work Phone Mobile Phone    CAMILLE BRANDON ([facility]) (Other) [phone] -- --              "

## 2025-02-19 NOTE — PLAN OF CARE
Goal Outcome Evaluation:  Plan of Care Reviewed With: patient, family        Progress: improving  Outcome Evaluation: Patient with good participation in OT session this AM. Patient reports recently working with PT, is agreeable to therex in OT session to improve patient endurance. Patient completes 4 x 10 UE therex with light theraband in seated position. patient does require rest breaks between each set, continued tolerance deficits present. Patient will benefit from continued skilled OT intervention to address deficits related to strength, tolerance, balance, transfers and mobility.    Anticipated Discharge Disposition (OT): home with home health, home with assist

## 2025-02-19 NOTE — THERAPY TREATMENT NOTE
Patient Name: Madelene Naegele  : 1940    MRN: [id number]                              Today's Date: 2025       Admit Date: 2/15/2025    Visit Dx:     ICD-10-CM ICD-9-CM   1. Acute encephalopathy  G93.40 348.30   2. Acute kidney injury  N17.9 584.9   3. Abnormal head CT  R93.0 793.0   4. Fall, initial encounter  W19.XXXA E888.9   5. Chronic anticoagulation  Z79.01 V58.61   6. Laceration of right hand without foreign body, initial encounter  S61.411A 882.0     Patient Active Problem List   Diagnosis    Cellulitis of left leg    Severe sepsis    HTN (hypertension)    Atrial fibrillation, persistent    Hyperlipidemia    History of stroke    CAD (coronary artery disease)    Type 2 diabetes mellitus, without long-term current use of insulin    Acute encephalopathy    Altered mental status    CKD (chronic kidney disease) stage 3, GFR 30-59 ml/min     Past Medical History:   Diagnosis Date    CAD (coronary artery disease)     History of stroke     Hyperlipidemia     Hypertension     Persistent atrial fibrillation     Type 2 diabetes mellitus      History reviewed. No pertinent surgical history.   General Information       Row Name 25 1231          OT Time and Intention    Document Type therapy note (daily note)  -ES     Mode of Treatment individual therapy;occupational therapy  -ES     Patient Effort good  -ES       Row Name 25 1231          General Information    Existing Precautions/Restrictions fall  -ES       Row Name 25 1231          Cognition    Orientation Status (Cognition) oriented x 3  -ES       Row Name 25 1231          Safety Issues/Impairments Affecting Functional Mobility    Impairments Affecting Function (Mobility) endurance/activity tolerance;strength;balance  -ES               User Key  (r) = Recorded By, (t) = Taken By, (c) = Cosigned By      Initials Name Provider Type    ES Yvonne Wood, OTR/L, CSRS Occupational Therapist                     Mobility/ADL's        Row Name 02/19/25 1232          Bed Mobility    Bed Mobility bed mobility (all) activities  -ES     All Activities, Boundary (Bed Mobility) not tested  -ES     Comment, (Bed Mobility) not tested. Patient met seated in recliner at therapy arrival to room  -ES               User Key  (r) = Recorded By, (t) = Taken By, (c) = Cosigned By      Initials Name Provider Type    Yvonne Mcghee, OTR/L, FREDIS Occupational Therapist                   Obj/Interventions       Row Name 02/19/25 1236          Shoulder (Therapeutic Exercise)    Shoulder (Therapeutic Exercise) strengthening exercise  -ES     Shoulder Strengthening (Therapeutic Exercise) bilateral;flexion;horizontal aBduction/aDduction;resistance band;yellow;10 repetitions;sitting  -ES       Row Name 02/19/25 1236          Elbow/Forearm (Therapeutic Exercise)    Elbow/Forearm (Therapeutic Exercise) strengthening exercise  -ES     Elbow/Forearm Strengthening (Therapeutic Exercise) bilateral;flexion;extension;resistance band;yellow;10 repetitions;sitting  -ES       Row Name 02/19/25 1236          Motor Skills    Therapeutic Exercise shoulder;elbow/forearm  -ES               User Key  (r) = Recorded By, (t) = Taken By, (c) = Cosigned By      Initials Name Provider Type    Yvonne Mcghee, RIANR/L, CSRS Occupational Therapist                   Goals/Plan    No documentation.                  Clinical Impression       Row Name 02/19/25 1237          Plan of Care Review    Plan of Care Reviewed With patient;family  -ES     Progress improving  -ES     Outcome Evaluation Patient with good participation in OT session this AM. Patient reports recently working with PT, is agreeable to therex in OT session to improve patient endurance. Patient completes 4 x 10 UE therex with light theraband in seated position. patient does require rest breaks between each set, continued tolerance deficits present. Patient will benefit from continued skilled OT intervention to address deficits  related to strength, tolerance, balance, transfers and mobility.  -ES       Row Name 02/19/25 1237          Therapy Plan Review/Discharge Plan (OT)    Anticipated Discharge Disposition (OT) home with home health;home with assist  -ES       Row Name 02/19/25 1237          Positioning and Restraints    Pre-Treatment Position sitting in chair/recliner  -ES     Post Treatment Position chair  -ES     In Chair reclined;call light within reach;encouraged to call for assist;exit alarm on  -ES               User Key  (r) = Recorded By, (t) = Taken By, (c) = Cosigned By      Initials Name Provider Type    Yvonne Mcghee, OTR/L, CSRS Occupational Therapist                   Outcome Measures       Row Name 02/19/25 1238          How much help from another is currently needed...    Putting on and taking off regular lower body clothing? 2  -ES     Bathing (including washing, rinsing, and drying) 2  -ES     Toileting (which includes using toilet bed pan or urinal) 3  -ES     Putting on and taking off regular upper body clothing 3  -ES     Taking care of personal grooming (such as brushing teeth) 4  -ES     Eating meals 4  -ES     AM-PAC 6 Clicks Score (OT) 18  -ES       Row Name 02/19/25 1109 02/19/25 0835       How much help from another person do you currently need...    Turning from your back to your side while in flat bed without using bedrails? 3  -PH 3  -JK    Moving from lying on back to sitting on the side of a flat bed without bedrails? 3  -PH 3  -JK    Moving to and from a bed to a chair (including a wheelchair)? 3  -PH 3  -JK    Standing up from a chair using your arms (e.g., wheelchair, bedside chair)? 3  -PH 3  -JK    Climbing 3-5 steps with a railing? 2  -PH 2  -JK    To walk in hospital room? 3  -PH 3  -JK    AM-PAC 6 Clicks Score (PT) 17  -PH 17  -JK    Highest Level of Mobility Goal 5 --> Static standing  -PH 5 --> Static standing  -JK      Row Name 02/19/25 1238 02/19/25 1109       Functional Assessment     Outcome Measure Options AM-PAC 6 Clicks Daily Activity (OT)  -ES AM-PAC 6 Clicks Basic Mobility (PT)  -PH              User Key  (r) = Recorded By, (t) = Taken By, (c) = Cosigned By      Initials Name Provider Type    PH Annie Mckee, PTA Physical Therapist Assistant    ES Yvonne Wood, OTR/L, CSRS Occupational Therapist    Rosibel Patterson, RN Registered Nurse                    Occupational Therapy Education       Title: PT OT SLP Therapies (Done)       Topic: Occupational Therapy (Done)       Point: ADL training (Done)       Description:   Instruct learner(s) on proper safety adaptation and remediation techniques during self care or transfers.   Instruct in proper use of assistive devices.                  Learning Progress Summary            Patient Acceptance, E,TB, VU by  at 2/16/2025 1142    Comment: Instructed in role of OT, discharge planing, home safety, fall prevention, use of call light                      Point: Home exercise program (Done)       Description:   Instruct learner(s) on appropriate technique for monitoring, assisting and/or progressing therapeutic exercises/activities.                  Learning Progress Summary            Patient Acceptance, E,TB, VU by  at 2/16/2025 1142    Comment: Instructed in role of OT, discharge planing, home safety, fall prevention, use of call light                      Point: Precautions (Done)       Description:   Instruct learner(s) on prescribed precautions during self-care and functional transfers.                  Learning Progress Summary            Patient Acceptance, E,TB, VU by  at 2/16/2025 1142    Comment: Instructed in role of OT, discharge planing, home safety, fall prevention, use of call light                      Point: Body mechanics (Done)       Description:   Instruct learner(s) on proper positioning and spine alignment during self-care, functional mobility activities and/or exercises.                  Learning Progress  Summary            Patient Acceptance, E,TB, VU by  at 2/16/2025 1142    Comment: Instructed in role of OT, discharge planing, home safety, fall prevention, use of call light                                      User Key       Initials Effective Dates Name Provider Type Discipline     08/31/23 -  Daja Seo, OT Occupational Therapist OT                  OT Recommendation and Plan     Plan of Care Review  Plan of Care Reviewed With: patient, family  Progress: improving  Outcome Evaluation: Patient with good participation in OT session this AM. Patient reports recently working with PT, is agreeable to therex in OT session to improve patient endurance. Patient completes 4 x 10 UE therex with light theraband in seated position. patient does require rest breaks between each set, continued tolerance deficits present. Patient will benefit from continued skilled OT intervention to address deficits related to strength, tolerance, balance, transfers and mobility.     Time Calculation:         Time Calculation- OT       Row Name 02/19/25 1239             Time Calculation- OT    OT Start Time 1100  -ES      OT Stop Time 1115  -ES      OT Time Calculation (min) 15 min  -ES      Total Timed Code Minutes- OT 15 minute(s)  -ES      OT Received On 02/19/25  -ES      OT - Next Appointment 02/20/25  -ES         Timed Charges    37903 - OT Therapeutic Exercise Minutes 15  -ES         Total Minutes    Timed Charges Total Minutes 15  -ES       Total Minutes 15  -ES                User Key  (r) = Recorded By, (t) = Taken By, (c) = Cosigned By      Initials Name Provider Type    ES Yvonne Wood OTR/L, CSRS Occupational Therapist                  Therapy Charges for Today       Code Description Service Date Service Provider Modifiers Qty    41453157157 HC OT THER PROC EA 15 MIN 2/19/2025 Yvonne Wood OTR/L, CSRS GO 1                 HANS Lopez/L, CSRS  2/19/2025

## 2025-02-19 NOTE — THERAPY TREATMENT NOTE
Patient Name: Madelene Naegele  : 1940    MRN: [id number]                              Today's Date: 2025       Admit Date: 2/15/2025    Visit Dx:     ICD-10-CM ICD-9-CM   1. Acute encephalopathy  G93.40 348.30   2. Acute kidney injury  N17.9 584.9   3. Abnormal head CT  R93.0 793.0   4. Fall, initial encounter  W19.XXXA E888.9   5. Chronic anticoagulation  Z79.01 V58.61   6. Laceration of right hand without foreign body, initial encounter  S61.411A 882.0     Patient Active Problem List   Diagnosis    Cellulitis of left leg    Severe sepsis    HTN (hypertension)    Atrial fibrillation, persistent    Hyperlipidemia    History of stroke    CAD (coronary artery disease)    Type 2 diabetes mellitus, without long-term current use of insulin    Acute encephalopathy    Altered mental status    CKD (chronic kidney disease) stage 3, GFR 30-59 ml/min     Past Medical History:   Diagnosis Date    CAD (coronary artery disease)     History of stroke     Hyperlipidemia     Hypertension     Persistent atrial fibrillation     Type 2 diabetes mellitus      History reviewed. No pertinent surgical history.   General Information       Row Name 25 1104          Physical Therapy Time and Intention    Document Type therapy note (daily note)  -PH     Mode of Treatment physical therapy  -PH       Row Name 25 1104          General Information    Existing Precautions/Restrictions fall  -PH     Barriers to Rehab medically complex;previous functional deficit  -PH       Row Name 25 1104          Safety Issues/Impairments Affecting Functional Mobility    Impairments Affecting Function (Mobility) endurance/activity tolerance;strength;balance  -PH     Comment, Safety Issues/Impairments (Mobility) gt belt and non skid socks donned  -PH               User Key  (r) = Recorded By, (t) = Taken By, (c) = Cosigned By      Initials Name Provider Type    PH Annie Mckee PTA Physical Therapist Assistant                    Mobility       Row Name 02/19/25 1104          Bed Mobility    Comment, (Bed Mobility) in BR at beg of session and UIC at end  -PH       Row Name 02/19/25 1104          Sit-Stand Transfer    Sit-Stand [place] (Transfers) contact guard;minimum assist (75% patient effort);verbal cues;nonverbal cues (demo/gesture)  -PH     Assistive Device (Sit-Stand Transfers) walker, standard  -PH     Comment, (Sit-Stand Transfer) from toilet  -PH       Row Name 02/19/25 1104          Gait/Stairs (Locomotion)    [place] Level (Gait) contact guard  -PH     Assistive Device (Gait) walker, standard  -PH     Distance in Feet (Gait) 20  -PH     Deviations/Abnormal Patterns (Gait) kelsey decreased;stride length decreased;gait speed decreased  -PH     Bilateral Gait Deviations forward flexed posture;heel strike decreased  -PH     [place] Level (Stairs) unable to assess  -PH     Comment, (Gait/Stairs) cues for postural correction; cues to keep B hands on fww w/ pt letting go at times and also to remain w/ fww until nearer to chair  -PH               User Key  (r) = Recorded By, (t) = Taken By, (c) = Cosigned By      Initials Name Provider Type     Annie Mckee PTA Physical Therapist Assistant                   Obj/Interventions       Row Name 02/19/25 1106          Motor Skills    Therapeutic Exercise other (see comments)  BAP, LAQ, seated march, punches, shldr flexion; x 12-15 reps  -PH       Row Name 02/19/25 1106          Balance    Balance Assessment sitting static balance;sitting dynamic balance;standing static balance  -PH     Static Sitting Balance standby assist  -PH     Dynamic Sitting Balance standby assist  -PH     Static Standing Balance contact guard  -PH     Position/Device Used, Standing Balance walker, standard  -PH               User Key  (r) = Recorded By, (t) = Taken By, (c) = Cosigned By      Initials Name Provider Type     Annie Mckee PTA Physical Therapist Assistant                    Goals/Plan    No documentation.                  Clinical Impression       Row Name 02/19/25 1107          Pain    Pretreatment Pain Rating 0/10 - no pain  -PH     Posttreatment Pain Rating 0/10 - no pain  -PH       Row Name 02/19/25 1107          Plan of Care Review    Plan of Care Reviewed With patient;caregiver  -PH     Progress improving  -PH     Outcome Evaluation Pt was seen for PT tx this AM. Pt was in BR at beg of session. Pt performed toileting hygiene IND. Pt stood w/ CGA. Pt amb 20' to chair w/ CGA and use of fww. Pt lacked safety awareness letting go of fww a few times and also attempting to complete amb when close to chair w/o use of fww. Pt performed ther ex x 12-15 reps and was UIC w/ caregiver at end of session.  -PH       Row Name 02/19/25 1107          Vital Signs    O2 Delivery Pre Treatment room air  -PH     O2 Delivery Intra Treatment room air  -PH     O2 Delivery Post Treatment room air  -PH       Row Name 02/19/25 1107          Positioning and Restraints    Pre-Treatment Position bathroom  -PH     Post Treatment Position chair  -PH     In Chair notified nsg;reclined;call light within reach;encouraged to call for assist;exit alarm on;with family/caregiver  -PH               User Key  (r) = Recorded By, (t) = Taken By, (c) = Cosigned By      Initials Name Provider Type    PH Annie Mckee PTA Physical Therapist Assistant                   Outcome Measures       Row Name 02/19/25 1109 02/19/25 0835       How much help from another person do you currently need...    Turning from your back to your side while in flat bed without using bedrails? 3  -PH 3  -JK    Moving from lying on back to sitting on the side of a flat bed without bedrails? 3  -PH 3  -JK    Moving to and from a bed to a chair (including a wheelchair)? 3  -PH 3  -JK    Standing up from a chair using your arms (e.g., wheelchair, bedside chair)? 3  -PH 3  -JK    Climbing 3-5 steps with a railing? 2  -PH 2  -JK     To walk in hospital room? 3  -PH 3  -JK    AM-PAC 6 Clicks Score (PT) 17  -PH 17  -JK    Highest Level of Mobility Goal 5 --> Static standing  -PH 5 --> Static standing  -JK      Row Name 02/19/25 1109          Functional Assessment    Outcome Measure Options AM-PAC 6 Clicks Basic Mobility (PT)  -               User Key  (r) = Recorded By, (t) = Taken By, (c) = Cosigned By      Initials Name Provider Type     Annie Mckee PTA Physical Therapist Assistant    Rosibel Patterson RN Registered Nurse                                 Physical Therapy Education       Title: PT OT SLP Therapies (Done)       Topic: Physical Therapy (Done)       Point: Mobility training (Done)       Learning Progress Summary            Patient Acceptance, E,TB,D, DU,NR by  at 2/19/2025 1109                      Point: Home exercise program (Done)       Learning Progress Summary            Patient Acceptance, E,TB,D, DU,NR by  at 2/19/2025 1109                      Point: Body mechanics (Done)       Learning Progress Summary            Patient Acceptance, E,TB,D, DU,NR by  at 2/19/2025 1109                      Point: Precautions (Done)       Learning Progress Summary            Patient Acceptance, E,TB,D, DU,NR by  at 2/19/2025 1109                                      User Key       Initials Effective Dates Name Provider Type Discipline     06/16/21 -  Annie Mckee PTA Physical Therapist Assistant PT                  PT Recommendation and Plan     Progress: improving  Outcome Evaluation: Pt was seen for PT tx this AM. Pt was in BR at beg of session. Pt performed toileting hygiene IND. Pt stood w/ CGA. Pt amb 20' to chair w/ CGA and use of fww. Pt lacked safety awareness letting go of fww a few times and also attempting to complete amb when close to chair w/o use of fww. Pt performed ther ex x 12-15 reps and was UIC w/ caregiver at end of session.     Time Calculation:         PT Charges       Row Name  02/19/25 1110             Time Calculation    Start Time 1039  -PH      Stop Time 1102  -PH      Time Calculation (min) 23 min  -PH      PT Received On 02/19/25  -PH      PT - Next Appointment 02/20/25  -PH         Timed Charges    76646 - PT Therapeutic Exercise Minutes 6  -PH      24833 - PT Therapeutic Activity Minutes 17  -PH         Total Minutes    Timed Charges Total Minutes 23  -PH       Total Minutes 23  -PH                User Key  (r) = Recorded By, (t) = Taken By, (c) = Cosigned By      Initials Name Provider Type    Annie Evans PTA Physical Therapist Assistant                  Therapy Charges for Today       Code Description Service Date Service Provider Modifiers Qty    23470382776 HC PT THER PROC EA 15 MIN 2/19/2025 Annie Mckee PTA GP 1    98058610587 HC PT THERAPEUTIC ACT EA 15 MIN 2/19/2025 Annie Mckee PTA GP 1            PT G-Codes  Outcome Measure Options: AM-PAC 6 Clicks Basic Mobility (PT)  AM-PAC 6 Clicks Score (PT): 17  AM-PAC 6 Clicks Score (OT): 18  PT Discharge Summary  Anticipated Discharge Disposition (PT): skilled nursing facility, home with home health, home with 24/7 care    Annie Mckee PTA  2/19/2025

## 2025-02-19 NOTE — CASE MANAGEMENT/SOCIAL WORK
Continued Stay Note  [facility]     Patient Name: Madelene Naegele  MRN: [id number]  Today's Date: 2/19/2025    Admit Date: 2/15/2025    Plan: Plan skilled care at accepting facility- pre cert needed.  BRY Parker RN   Discharge Plan       Row Name 02/19/25 1317       Plan    Plan Plan skilled care at accepting facility- pre cert needed.  BRY Parker RN    Patient/Family in Agreement with Plan yes    Plan Comments Spoke with pt at bedside. Pt requested CCP call her POA & HCS (Jovita Ortega [phone]) to discussed skilled care facilities.  Called Tomi and provided her with a list of skilled care facilities.  Tomi to speak with some friends and research facilities and will call CCP with her choices.  Plan skilled care at accepting facility- pre cert needed.  BRY Parker RN                   Discharge Codes    No documentation.                 Expected Discharge Date and Time       Expected Discharge Date Expected Discharge Time    Feb 21, 2025               Rachel Parker RN

## 2025-02-20 LAB
ANION GAP SERPL CALCULATED.3IONS-SCNC: 9.9 MMOL/L (ref 5–15)
BACTERIA SPEC AEROBE CULT: NORMAL
BACTERIA SPEC AEROBE CULT: NORMAL
BUN SERPL-MCNC: 25 MG/DL (ref 8–23)
BUN/CREAT SERPL: 19.4 (ref 7–25)
CALCIUM SPEC-SCNC: 9.3 MG/DL (ref 8.6–10.5)
CHLORIDE SERPL-SCNC: 102 MMOL/L (ref 98–107)
CO2 SERPL-SCNC: 28.1 MMOL/L (ref 22–29)
CREAT SERPL-MCNC: 1.29 MG/DL (ref 0.57–1)
DEPRECATED RDW RBC AUTO: 49.5 FL (ref 37–54)
EGFRCR SERPLBLD CKD-EPI 2021: 41.3 ML/MIN/1.73
ERYTHROCYTE [DISTWIDTH] IN BLOOD BY AUTOMATED COUNT: 13 % (ref 12.3–15.4)
GLUCOSE BLDC GLUCOMTR-MCNC: 127 MG/DL (ref 70–130)
GLUCOSE BLDC GLUCOMTR-MCNC: 220 MG/DL (ref 70–130)
GLUCOSE BLDC GLUCOMTR-MCNC: 273 MG/DL (ref 70–130)
GLUCOSE BLDC GLUCOMTR-MCNC: 96 MG/DL (ref 70–130)
GLUCOSE SERPL-MCNC: 145 MG/DL (ref 65–99)
HCT VFR BLD AUTO: 42.9 % (ref 34–46.6)
HGB BLD-MCNC: 15.4 G/DL (ref 12–15.9)
MAGNESIUM SERPL-MCNC: 2 MG/DL (ref 1.6–2.4)
MCH RBC QN AUTO: 37.6 PG (ref 26.6–33)
MCHC RBC AUTO-ENTMCNC: 35.9 G/DL (ref 31.5–35.7)
MCV RBC AUTO: 104.6 FL (ref 79–97)
PHOSPHATE SERPL-MCNC: 4.3 MG/DL (ref 2.5–4.5)
PLATELET # BLD AUTO: 159 10*3/MM3 (ref 140–450)
PMV BLD AUTO: 10 FL (ref 6–12)
POTASSIUM SERPL-SCNC: 4.6 MMOL/L (ref 3.5–5.2)
QT INTERVAL: 244 MS
QTC INTERVAL: 356 MS
RBC # BLD AUTO: 4.1 10*6/MM3 (ref 3.77–5.28)
SODIUM SERPL-SCNC: 140 MMOL/L (ref 136–145)
WBC NRBC COR # BLD AUTO: 8.06 10*3/MM3 (ref 3.4–10.8)

## 2025-02-20 PROCEDURE — 63710000001 INSULIN LISPRO (HUMAN) PER 5 UNITS: Performed by: INTERNAL MEDICINE

## 2025-02-20 PROCEDURE — 80048 BASIC METABOLIC PNL TOTAL CA: CPT | Performed by: HOSPITALIST

## 2025-02-20 PROCEDURE — 83735 ASSAY OF MAGNESIUM: CPT | Performed by: HOSPITALIST

## 2025-02-20 PROCEDURE — 93005 ELECTROCARDIOGRAM TRACING: CPT | Performed by: HOSPITALIST

## 2025-02-20 PROCEDURE — 84100 ASSAY OF PHOSPHORUS: CPT | Performed by: HOSPITALIST

## 2025-02-20 PROCEDURE — 85027 COMPLETE CBC AUTOMATED: CPT | Performed by: HOSPITALIST

## 2025-02-20 PROCEDURE — 82948 REAGENT STRIP/BLOOD GLUCOSE: CPT

## 2025-02-20 PROCEDURE — 93010 ELECTROCARDIOGRAM REPORT: CPT | Performed by: INTERNAL MEDICINE

## 2025-02-20 PROCEDURE — 97530 THERAPEUTIC ACTIVITIES: CPT

## 2025-02-20 RX ORDER — LISINOPRIL 10 MG/1
5 TABLET ORAL DAILY
Status: DISCONTINUED | OUTPATIENT
Start: 2025-02-20 | End: 2025-02-21

## 2025-02-20 RX ORDER — METOPROLOL SUCCINATE 100 MG/1
100 TABLET, EXTENDED RELEASE ORAL 2 TIMES DAILY
COMMUNITY

## 2025-02-20 RX ORDER — METOPROLOL SUCCINATE 100 MG/1
100 TABLET, EXTENDED RELEASE ORAL EVERY 12 HOURS SCHEDULED
Status: DISCONTINUED | OUTPATIENT
Start: 2025-02-20 | End: 2025-02-26 | Stop reason: HOSPADM

## 2025-02-20 RX ORDER — METOPROLOL SUCCINATE 25 MG/1
50 TABLET, EXTENDED RELEASE ORAL EVERY 12 HOURS SCHEDULED
Status: DISCONTINUED | OUTPATIENT
Start: 2025-02-20 | End: 2025-02-20

## 2025-02-20 RX ADMIN — ATORVASTATIN CALCIUM 40 MG: 20 TABLET, FILM COATED ORAL at 08:46

## 2025-02-20 RX ADMIN — LISINOPRIL 5 MG: 10 TABLET ORAL at 12:38

## 2025-02-20 RX ADMIN — METOPROLOL SUCCINATE 100 MG: 100 TABLET, EXTENDED RELEASE ORAL at 21:35

## 2025-02-20 RX ADMIN — GABAPENTIN 100 MG: 100 CAPSULE ORAL at 21:35

## 2025-02-20 RX ADMIN — GABAPENTIN 100 MG: 100 CAPSULE ORAL at 08:45

## 2025-02-20 RX ADMIN — INSULIN LISPRO 4 UNITS: 100 INJECTION, SOLUTION INTRAVENOUS; SUBCUTANEOUS at 12:34

## 2025-02-20 RX ADMIN — METOPROLOL SUCCINATE 50 MG: 25 TABLET, EXTENDED RELEASE ORAL at 08:45

## 2025-02-20 RX ADMIN — METOPROLOL TARTRATE 5 MG: 5 INJECTION INTRAVENOUS at 13:06

## 2025-02-20 RX ADMIN — APIXABAN 5 MG: 5 TABLET, FILM COATED ORAL at 08:45

## 2025-02-20 RX ADMIN — TORSEMIDE 20 MG: 20 TABLET ORAL at 08:45

## 2025-02-20 RX ADMIN — POTASSIUM CHLORIDE 40 MEQ: 1500 TABLET, EXTENDED RELEASE ORAL at 08:45

## 2025-02-20 RX ADMIN — DIVALPROEX SODIUM 125 MG: 125 TABLET, DELAYED RELEASE ORAL at 21:35

## 2025-02-20 RX ADMIN — DILTIAZEM HYDROCHLORIDE 240 MG: 120 CAPSULE, EXTENDED RELEASE ORAL at 08:45

## 2025-02-20 RX ADMIN — ASPIRIN 81 MG: 81 TABLET, COATED ORAL at 08:46

## 2025-02-20 RX ADMIN — APIXABAN 5 MG: 5 TABLET, FILM COATED ORAL at 21:35

## 2025-02-20 RX ADMIN — DIVALPROEX SODIUM 125 MG: 125 TABLET, DELAYED RELEASE ORAL at 08:45

## 2025-02-20 RX ADMIN — Medication 2000 UNITS: at 08:45

## 2025-02-20 RX ADMIN — INSULIN LISPRO 3 UNITS: 100 INJECTION, SOLUTION INTRAVENOUS; SUBCUTANEOUS at 21:35

## 2025-02-20 RX ADMIN — SERTRALINE HYDROCHLORIDE 25 MG: 50 TABLET, FILM COATED ORAL at 08:45

## 2025-02-20 NOTE — THERAPY TREATMENT NOTE
Patient Name: Madelene Naegele  : 1940    MRN: [id number]                              Today's Date: 2025       Admit Date: 2/15/2025    Visit Dx:     ICD-10-CM ICD-9-CM   1. Acute encephalopathy  G93.40 348.30   2. Acute kidney injury  N17.9 584.9   3. Abnormal head CT  R93.0 793.0   4. Fall, initial encounter  W19.XXXA E888.9   5. Chronic anticoagulation  Z79.01 V58.61   6. Laceration of right hand without foreign body, initial encounter  S61.411A 882.0     Patient Active Problem List   Diagnosis    Cellulitis of left leg    Severe sepsis    HTN (hypertension)    Atrial fibrillation, persistent    Hyperlipidemia    History of stroke    CAD (coronary artery disease)    Type 2 diabetes mellitus, without long-term current use of insulin    Acute encephalopathy    Altered mental status    CKD (chronic kidney disease) stage 3, GFR 30-59 ml/min     Past Medical History:   Diagnosis Date    CAD (coronary artery disease)     History of stroke     Hyperlipidemia     Hypertension     Persistent atrial fibrillation     Type 2 diabetes mellitus      History reviewed. No pertinent surgical history.   General Information       Row Name 25 1233          Physical Therapy Time and Intention    Document Type therapy note (daily note)  -SV     Mode of Treatment individual therapy;physical therapy  -SV               User Key  (r) = Recorded By, (t) = Taken By, (c) = Cosigned By      Initials Name Provider Type    SV Lizette Walsh, PT Physical Therapist                   Mobility       Row Name 25 1233          Bed Mobility    Comment, (Bed Mobility) NT due to [facility]  -SV       Row Name 25 1233          Sit-Stand Transfer    Sit-Stand [place] (Transfers) contact guard  -SV     Assistive Device (Sit-Stand Transfers) walker, front-wheeled  -SV     Comment, (Sit-Stand Transfer) from recliner and from toilet with low seat and railing on right , vc to back up to chair before sitting, unsafe stand to sit  x2 with vc unheeded  -SV       Row Name 02/20/25 1233          Gait/Stairs (Locomotion)    [place] Level (Gait) contact guard  -SV     Distance in Feet (Gait) 35  30' to BR as well  -SV     Deviations/Abnormal Patterns (Gait) festinating/shuffling;gait speed decreased;stride length decreased  -SV     Bilateral Gait Deviations heel strike decreased;forward flexed posture  -SV               User Key  (r) = Recorded By, (t) = Taken By, (c) = Cosigned By      Initials Name Provider Type     Lizette Walsh, PT Physical Therapist                   Obj/Interventions       Row Name 02/20/25 1235          Motor Skills    Therapeutic Exercise --  laq with df for posterior leg stretch( caregiver present), 3 r 10h  linda  -SV       Row Name 02/20/25 1235          Balance    Balance Assessment sitting static balance;standing static balance  -SV     Static Sitting Balance standby assist;supervision  -SV     Static Standing Balance contact guard  -SV     Position/Device Used, Standing Balance walker, rolling  -SV               User Key  (r) = Recorded By, (t) = Taken By, (c) = Cosigned By      Initials Name Provider Type    SV Lizette Walsh, PT Physical Therapist                   Goals/Plan    No documentation.                  Clinical Impression       [facility] Name 02/20/25 1236          Pain    Additional Documentation Pain Scale: FACES Pre/Post-Treatment (Group)  -[facility] Name 02/20/25 1236          Pain Scale: FACES Pre/Post-Treatment    Pain: FACES Scale, Pretreatment 0-->no hurt  -SV     Posttreatment Pain Rating 0-->no hurt  -SV     Pre/Posttreatment Pain Comment smiling today, no report or sign of pain noted  -       Row Name 02/20/25 1236          Plan of Care Review    Plan of Care Reviewed With patient;caregiver  -[facility] Name 02/20/25 1236          Vital Signs    O2 Delivery Pre Treatment room air  -SV     O2 Delivery Intra Treatment room air  -SV     O2 Delivery Post Treatment room air  -SV     Pre  Patient Position Sitting  -SV     Intra Patient Position Standing  -SV     Post Patient Position Sitting  -SV       Row Name 02/20/25 1236          Positioning and Restraints    Pre-Treatment Position sitting in chair/recliner  -SV     Post Treatment Position chair  -SV     In Chair sitting;call light within reach;encouraged to call for assist;with family/caregiver;notified nsg  -SV               User Key  (r) = Recorded By, (t) = Taken By, (c) = Cosigned By      Initials Name Provider Type     Lizette Walsh, PT Physical Therapist                   Outcome Measures       Row Name 02/20/25 1237 02/20/25 0848       How much help from another person do you currently need...    Turning from your back to your side while in flat bed without using bedrails? 4  -SV 4  -PB    Moving from lying on back to sitting on the side of a flat bed without bedrails? 4  -SV 4  -PB    Moving to and from a bed to a chair (including a wheelchair)? 3  -SV 3  -PB    Standing up from a chair using your arms (e.g., wheelchair, bedside chair)? 3  -SV 3  -PB    Climbing 3-5 steps with a railing? 3  -SV 2  -PB    To walk in hospital room? 3  -SV 2  -PB    AM-PAC 6 Clicks Score (PT) 20  -SV 18  -PB    Highest Level of Mobility Goal 6 --> Walk 10 steps or more  -SV 6 --> Walk 10 steps or more  -PB              User Key  (r) = Recorded By, (t) = Taken By, (c) = Cosigned By      Initials Name Provider Type    Lizette Juarez, PT Physical Therapist    PB Nevaeh Haq, RN Registered Nurse                                 Physical Therapy Education       Title: PT OT SLP Therapies (Done)       Topic: Physical Therapy (Done)       Point: Mobility training (Done)       Learning Progress Summary            Patient Eliot, GERONIMO, VU,NR by SV at 2/20/2025 1238    Acceptance, E,TB,D, DU,NR by  at 2/19/2025 1109                      Point: Home exercise program (Done)       Learning Progress Summary            Patient Eliot, GERONIMO, VU,NR by  at 2/20/2025  1238    Acceptance, E,TB,D, DU,NR by  at 2/19/2025 1109                      Point: Body mechanics (Done)       Learning Progress Summary            Patient Eager, E, VU,NR by  at 2/20/2025 1238    Acceptance, E,TB,D, DU,NR by  at 2/19/2025 1109                      Point: Precautions (Done)       Learning Progress Summary            Patient Eager, E, VU,NR by  at 2/20/2025 1238    Acceptance, E,TB,D, DU,NR by  at 2/19/2025 1109                                      User Key       Initials Effective Dates Name Provider Type Discipline     07/11/23 -  Lizette Walsh, PT Physical Therapist PT    PH 06/16/21 -  Annie Mckee PTA Physical Therapist Assistant PT                  PT Recommendation and Plan           Time Calculation:         PT Charges       Row Name 02/20/25 1242             Time Calculation    Start Time 1156  -      Stop Time 1225  -      Time Calculation (min) 29 min  -      PT Received On 02/20/25  -      PT - Next Appointment 02/21/25  -                User Key  (r) = Recorded By, (t) = Taken By, (c) = Cosigned By      Initials Name Provider Type     Lizette Walsh, PT Physical Therapist                  Therapy Charges for Today       Code Description Service Date Service Provider Modifiers Qty    [id number]  PT THERAPEUTIC ACT EA 15 MIN 2/20/2025 Lizette Walsh, PT GP 2            PT G-Codes  Outcome Measure Options: AM-PAC 6 Clicks Daily Activity (OT)  AM-PAC 6 Clicks Score (PT): 20  AM-PAC 6 Clicks Score (OT): 18       Lizette Walsh, PT  2/20/2025

## 2025-02-20 NOTE — NURSING NOTE
Pt was off tele per orders. During afternoon vitals her HR was 126 per pulse ox check. After applying heart monitor pt is in afib rvr in 130s. MD made aware and EKG being obtained.   Clarified with POA that her home toprolol XL is supposed to be BID- this has been updated. Dr. Silva giving 1x dose 5mg IV lopressor

## 2025-02-20 NOTE — PLAN OF CARE
Goal Outcome Evaluation:         Pt. Alert, oriented x2, used bedside commode with 1 person assisted, v/s stable, 02 sats 96% on room air, no voiced c/o pain, no s/s acute distress noted

## 2025-02-20 NOTE — PROGRESS NOTES
Name: Madelene Naegele ADMIT: 2/15/2025   : 1940  PCP: Ava Sebastian MD    MRN: [id number] LOS: 4 days   AGE/SEX: 84 y.o. female  ROOM: [facility]     Subjective   Subjective   Caregiver at bedside.  Sitting up in chair without any new complaints.     Objective   Objective   Vital Signs  Temp:  [97.3 °F (36.3 °C)-98.2 °F (36.8 °C)] 97.3 °F (36.3 °C)  Heart Rate:  [69-87] 87  Resp:  [18] 18  BP: (100-142)/(70-98) 142/98  SpO2:  [97 %-98 %] 98 %  on   ;   Device (Oxygen Therapy): room air  Body mass index is 28.79 kg/m².    Physical Exam  Constitutional:       General: She is not in acute distress.     Appearance: She is not toxic-appearing.   Cardiovascular:      Rate and Rhythm: Normal rate. Rhythm irregular.   Pulmonary:      Effort: Pulmonary effort is normal.      Breath sounds: Wheezing present.   Abdominal:      General: Bowel sounds are normal.      Palpations: Abdomen is soft.      Tenderness: There is no abdominal tenderness.   Musculoskeletal:         General: No swelling.   Skin:     General: Skin is warm and dry.   Neurological:      Mental Status: She is alert. Mental status is at baseline.      Cranial Nerves: No facial asymmetry.     Results Review  I reviewed the patient's new clinical results.  Results from last 7 days   Lab Units 25  0706 25  0443 25  0600 25  0558   WBC 10*3/mm3 8.06 8.02 9.12 10.18   HEMOGLOBIN g/dL 15.4 13.6 13.9 13.7   PLATELETS 10*3/mm3 159 147 145 135*     Results from last 7 days   Lab Units 25  0706 25  0442 25  0600 25  0558 25  0602 02/15/25  1744   SODIUM mmol/L 140 138 137 139 140 138   POTASSIUM mmol/L 4.6 5.4* 4.9 4.5 4.0 4.4   CHLORIDE mmol/L 102 107 105 102 103 101   CO2 mmol/L 28.1 24.0 24.0 27.1 27.0 24.0   BUN mg/dL 25* 21 15 24* 24* 27*   CREATININE mg/dL 1.29* 1.21* 1.07* 1.17* 1.20* 1.38*   GLUCOSE mg/dL 145* 104* 156* 164* 158* 198*   EGFR mL/min/1.73 41.3* 44.6* 51.6* 46.4* 45.0* 38.1*   ALBUMIN  g/dL  --   --   --   --   --  4.0   BILIRUBIN mg/dL  --   --   --   --   --  0.9   ALK PHOS U/L  --   --   --   --   --  62   AST (SGOT) U/L  --   --   --   --   --  16   ALT (SGPT) U/L  --   --   --   --   --  12     Results from last 7 days   Lab Units 02/20/25  0706 02/19/25  0442 02/18/25  0600 02/17/25  0558 02/16/25  0602 02/15/25  1744   CALCIUM mg/dL 9.3 8.8 8.9 8.8   < > 9.3   ALBUMIN g/dL  --   --   --   --   --  4.0   MAGNESIUM mg/dL 2.0 2.2 2.4 2.0  --  1.8   PHOSPHORUS mg/dL 4.3 3.3 3.4 3.0  --   --     < > = values in this interval not displayed.     Results from last 7 days   Lab Units 02/15/25  2048 02/15/25  1744   LACTATE mmol/L 1.6 2.9*     Glucose   Date/Time Value Ref Range Status   02/20/2025 0705 127 70 - 130 mg/dL Final   02/19/2025 2109 175 (H) 70 - 130 mg/dL Final   02/19/2025 1529 139 (H) 70 - 130 mg/dL Final   02/19/2025 1105 211 (H) 70 - 130 mg/dL Final   02/19/2025 0615 106 70 - 130 mg/dL Final   02/18/2025 2112 157 (H) 70 - 130 mg/dL Final   02/18/2025 1532 202 (H) 70 - 130 mg/dL Final       No radiology results for the last day    Scheduled Meds  apixaban, 5 mg, Oral, BID  aspirin, 81 mg, Oral, Daily  atorvastatin, 40 mg, Oral, Daily  cholecalciferol, 2,000 Units, Oral, Daily  dilTIAZem CD, 240 mg, Oral, Q24H  divalproex, 125 mg, Oral, BID  gabapentin, 100 mg, Oral, BID  insulin lispro, 2-7 Units, Subcutaneous, 4x Daily AC & at Bedtime  metoprolol succinate XL, 50 mg, Oral, Daily  potassium chloride, 40 mEq, Oral, Daily  sertraline, 25 mg, Oral, Daily  torsemide, 20 mg, Oral, Daily    Continuous Infusions     PRN Meds    acetaminophen **OR** acetaminophen **OR** acetaminophen    senna-docusate sodium **AND** polyethylene glycol **AND** bisacodyl **AND** bisacodyl    dextrose    dextrose    glucagon (human recombinant)    hydrOXYzine    melatonin    ondansetron ODT **OR** ondansetron    [COMPLETED] Insert Peripheral IV **AND** sodium chloride       Diet  Diet: Cardiac; Healthy Heart  (2-3 Na+); Fluid Consistency: Thin (IDDSI 0)       Assessment/Plan     Active Hospital Problems    Diagnosis  POA    **Acute encephalopathy [G93.40]  Yes    CKD (chronic kidney disease) stage 3, GFR 30-59 ml/min [N18.30]  Unknown    Altered mental status [R41.82]  Yes    Atrial fibrillation, persistent [I48.19]  Yes    CAD (coronary artery disease) [I25.10]  Yes    History of stroke [Z86.73]  Not Applicable    HTN (hypertension) [I10]  Yes    Hyperlipidemia [E78.5]  Yes    Type 2 diabetes mellitus, without long-term current use of insulin [E11.9]  Yes      Resolved Hospital Problems   No resolved problems to display.       84 y.o. female with Acute encephalopathy.    AFVSS  CBC normal  Creatinine stable 1.29  K has normalized  BS well-controlled  /98, will restart lisinopril      Stable for discharge once arrangements made.  Off monitor  No need for further lab monitoring        Fall  Altered mental status  History of stroke 2022 with aphasia  CT age-indeterminate infarct left frontal, chronic infarcts  MRI no acute stroke   B12 and folate okay  Blood cultures no growth 24 hours. Urine okay. Check chest x-ray  She seems to be baseline  PT recommends SNF    NO evidence of FOUZIA  CKD 3a  FOUZIA ruled out, creatinine near baseline  Was probably little hemoconcentrated.   BP remains soft therefore continue to hold ACE    Atrial fibrillation  Per med rec takes diltiazem ER but does NOT take Toprol.  Will have pharmacy verify.  Still waiting on clarification.  Continue Eliquis    Diabetes mellitus type 2  Metformin on hold.   Continue correctional insulin  A1c 7.8% in May 2024    Hypertension   Restarting meds as BP tolerates      SCDs for DVT prophylaxis.  Full code.  Discussed with patient and care team on multidisciplinary rounds.  Anticipate discharge to SNU facility once arrangements have been made.  Expected Discharge Date: 2/21/2025; Expected Discharge Time:       Dave Silva MD  [facility]  02/20/25  10:14 EST    ADDENDUM  Patient developed tachycardia and placed back on monitor.  Seems to be in rapid atrial fibrillation which is not new diagnosis.  Per my discussion with nurse she actually takes Toprol twice daily at home.  This is being clarified on the med rec.  Will give patient a dose of Lopressor IV and increase her Toprol to twice daily dosing.  She is already on AC.     Electronically signed by Dave Silva MD, 02/20/25, 3:56 PM EST.

## 2025-02-20 NOTE — PLAN OF CARE
Goal Outcome Evaluation:           Progress: improving  Outcome Evaluation: Pt AOx2-3. On RA. Afib on monitor. Pts afternoon HR 130s. Pt hooked back up to heart monitor- EKG obtained. IV lopressor given x1-HR now WNL. Awaiting SNF accepting facility. Updating home metoprolol XL to dose shes supposed to be taking. Pt walked to door and sat up in chair for most of the day. Caregiver at bedside. Pt denies pain. New IV placed in L FA. Pt not in acute distress. Plan of care ongoing

## 2025-02-20 NOTE — PLAN OF CARE
Goal Outcome Evaluation:  Plan of Care Reviewed With: patient, caregiver      Pt uic with caregiver present. Pt STS to rwx with cga. She amb 15'x2 to BR with cga flexed posture and shuffle pattern. Vc for safe stand to sit due to released rwx prior to backed up to recliner. Pt performed her own hygiene. Seated rest with laq and df for pf stretch x3 today 10 sec hold. STS to rwx with cga and amb another 35'. Improvement noted. Recommend 24 hour asst/up with asst at all times due to fall risk. Vc again required for safety during stand to sit as pt did not back all the way up. Some impulsivity noted. Recommended SNF rehab to pt today. Caregiver reported indep amb household distances with flexed posture but no  AD.

## 2025-02-21 LAB
GLUCOSE BLDC GLUCOMTR-MCNC: 105 MG/DL (ref 70–130)
GLUCOSE BLDC GLUCOMTR-MCNC: 132 MG/DL (ref 70–130)
GLUCOSE BLDC GLUCOMTR-MCNC: 248 MG/DL (ref 70–130)
GLUCOSE BLDC GLUCOMTR-MCNC: 278 MG/DL (ref 70–130)

## 2025-02-21 PROCEDURE — 97530 THERAPEUTIC ACTIVITIES: CPT

## 2025-02-21 PROCEDURE — 97535 SELF CARE MNGMENT TRAINING: CPT

## 2025-02-21 PROCEDURE — 63710000001 INSULIN LISPRO (HUMAN) PER 5 UNITS: Performed by: INTERNAL MEDICINE

## 2025-02-21 PROCEDURE — 82948 REAGENT STRIP/BLOOD GLUCOSE: CPT

## 2025-02-21 RX ADMIN — DILTIAZEM HYDROCHLORIDE 240 MG: 120 CAPSULE, EXTENDED RELEASE ORAL at 09:30

## 2025-02-21 RX ADMIN — APIXABAN 5 MG: 5 TABLET, FILM COATED ORAL at 09:31

## 2025-02-21 RX ADMIN — ASPIRIN 81 MG: 81 TABLET, COATED ORAL at 09:29

## 2025-02-21 RX ADMIN — DIVALPROEX SODIUM 125 MG: 125 TABLET, DELAYED RELEASE ORAL at 21:20

## 2025-02-21 RX ADMIN — GABAPENTIN 100 MG: 100 CAPSULE ORAL at 09:30

## 2025-02-21 RX ADMIN — INSULIN LISPRO 4 UNITS: 100 INJECTION, SOLUTION INTRAVENOUS; SUBCUTANEOUS at 12:11

## 2025-02-21 RX ADMIN — GABAPENTIN 100 MG: 100 CAPSULE ORAL at 21:19

## 2025-02-21 RX ADMIN — TORSEMIDE 20 MG: 20 TABLET ORAL at 09:30

## 2025-02-21 RX ADMIN — SERTRALINE HYDROCHLORIDE 25 MG: 50 TABLET, FILM COATED ORAL at 09:30

## 2025-02-21 RX ADMIN — POTASSIUM CHLORIDE 40 MEQ: 1500 TABLET, EXTENDED RELEASE ORAL at 09:31

## 2025-02-21 RX ADMIN — INSULIN LISPRO 3 UNITS: 100 INJECTION, SOLUTION INTRAVENOUS; SUBCUTANEOUS at 17:37

## 2025-02-21 RX ADMIN — ATORVASTATIN CALCIUM 40 MG: 20 TABLET, FILM COATED ORAL at 09:30

## 2025-02-21 RX ADMIN — LISINOPRIL 5 MG: 10 TABLET ORAL at 09:29

## 2025-02-21 RX ADMIN — Medication 2000 UNITS: at 09:30

## 2025-02-21 RX ADMIN — APIXABAN 5 MG: 5 TABLET, FILM COATED ORAL at 21:19

## 2025-02-21 RX ADMIN — METOPROLOL SUCCINATE 100 MG: 100 TABLET, EXTENDED RELEASE ORAL at 09:30

## 2025-02-21 RX ADMIN — DIVALPROEX SODIUM 125 MG: 125 TABLET, DELAYED RELEASE ORAL at 09:31

## 2025-02-21 NOTE — PLAN OF CARE
Goal Outcome Evaluation:           Progress: improving  Outcome Evaluation: Pt AOx2-3. On RA. AFIB on monitor- HR higher this am- more controlled around 1500. Pts SBP soft this afternoon. MD made aware- stopping lisinopril/torsemide and holding parameters on otehr BP meds. Pt denies dizziness. Sat up in chair for most of day. New mepilex placed on R hand scab. Pt denies pain. Pt not in acute distress. CCP awaiting placement. Plan of care ongoing

## 2025-02-21 NOTE — PLAN OF CARE
Goal Outcome Evaluation:  Plan of Care Reviewed With: patient        Progress: improving  Outcome Evaluation: no complalints voiced, denies pain, continues in afib, tachy at times, Eyes closed at long interval, resting quietly in room

## 2025-02-21 NOTE — THERAPY TREATMENT NOTE
Patient Name: Madelene Naegele  : 1940    MRN: [id number]                              Today's Date: 2025       Admit Date: 2/15/2025    Visit Dx:     ICD-10-CM ICD-9-CM   1. Acute encephalopathy  G93.40 348.30   2. Acute kidney injury  N17.9 584.9   3. Abnormal head CT  R93.0 793.0   4. Fall, initial encounter  W19.XXXA E888.9   5. Chronic anticoagulation  Z79.01 V58.61   6. Laceration of right hand without foreign body, initial encounter  S61.411A 882.0     Patient Active Problem List   Diagnosis    Cellulitis of left leg    Severe sepsis    HTN (hypertension)    Atrial fibrillation, persistent    Hyperlipidemia    History of stroke    CAD (coronary artery disease)    Type 2 diabetes mellitus, without long-term current use of insulin    Acute encephalopathy    Altered mental status    CKD (chronic kidney disease) stage 3, GFR 30-59 ml/min     Past Medical History:   Diagnosis Date    CAD (coronary artery disease)     History of stroke     Hyperlipidemia     Hypertension     Persistent atrial fibrillation     Type 2 diabetes mellitus      History reviewed. No pertinent surgical history.   General Information       Row Name 25 0926          OT Time and Intention    Subjective Information no complaints  -ES     Document Type therapy note (daily note)  -ES     Mode of Treatment individual therapy;occupational therapy  -ES     Patient Effort good  -ES       Row Name 25 0926          Cognition    Orientation Status (Cognition) oriented x 3  patient pleasant and cooperaitve, agreeable to therapy participation  -ES       Row Name 25 0926          Safety Issues/Impairments Affecting Functional Mobility    Impairments Affecting Function (Mobility) endurance/activity tolerance;strength;balance  -ES               User Key  (r) = Recorded By, (t) = Taken By, (c) = Cosigned By      Initials Name Provider Type    ES Yvonne Wood, OTR/L, CSRS Occupational Therapist                      Mobility/ADL's       Row Name 02/21/25 0930          Bed Mobility    Bed Mobility bed mobility (all) activities  -ES     All Activities, Lee (Bed Mobility) not tested  -ES     Comment, (Bed Mobility) not tested. patient met seated in recliner at OT arrival to room  -ES       Row Name 02/21/25 0930          Transfers    Transfers sit-stand transfer;stand-sit transfer;toilet transfer  -ES       Row Name 02/21/25 0930          Sit-Stand Transfer    Sit-Stand Lee (Transfers) contact guard  -ES     Assistive Device (Sit-Stand Transfers) walker, front-wheeled  -ES       Row Name 02/21/25 0930          Stand-Sit Transfer    Stand-Sit Lee (Transfers) contact guard;1 person assist  -ES     Assistive Device (Stand-Sit Transfers) walker, front-wheeled  -ES     Comment, (Stand-Sit Transfer) cueig provided for hand placement on chair rails with descent to prevent patient fall  -ES       Row Name 02/21/25 0930          Toilet Transfer    Type (Toilet Transfer) sit-stand;stand-sit  -ES     [place] Level (Toilet Transfer) contact guard;1 person assist  -ES     Assistive Device (Toilet Transfer) commode;grab bars/safety frame  -ES     Comment, (Toilet Transfer) cueing for hand placement on grab bars with ascent and descent  -ES       Row Name 02/21/25 0930          Functional Mobility    Functional Mobility- Ind. Level contact guard assist;1 person  -ES     Functional Mobility- Device walker, front-wheeled  -ES     Functional Mobility- Comment patient peformed functional mobility in room to bathroom and sinkside, CGA with use of rolling walker  -ES       Row Name 02/21/25 0930          Activities of Daily Living    BADL Assessment/Intervention toileting;grooming  -ES       Row Name 02/21/25 0930          Grooming Assessment/Training    [place] Level (Grooming) grooming skills;wash face, hands;contact guard assist  -ES     Position (Grooming) sink side;supported standing  -ES     Comment,  (Grooming) hand hygiene completed at sink following toileting task and pericare completion  -ES       Row Name 02/21/25 0930          Toileting Assessment/Training    [place] Level (Toileting) toileting skills;adjust/manage clothing;perform perineal hygiene;contact guard assist  -ES     Position (Toileting) unsupported sitting;supported standing  -ES     Comment, (Toileting) patient is able to complete pericare without assistance, CGA provided in stance for patient safety  -ES               User Key  (r) = Recorded By, (t) = Taken By, (c) = Cosigned By      Initials Name Provider Type    Yvonne Mcghee, OTR/L, FREDIS Occupational Therapist                   Obj/Interventions       Row Name 02/21/25 0934          Motor Skills    Motor Skills functional endurance  -ES     Functional Endurance fair. Patient HR elevated with mobility and standing, highest reading 130s for short intervals.  -ES       Row Name 02/21/25 0934          Balance    Balance Assessment sitting dynamic balance;standing dynamic balance  -ES     Dynamic Sitting Balance standby assist  -ES     Position, Sitting Balance sitting in chair  -ES     Dynamic Standing Balance contact guard  -ES     Position/Device Used, Standing Balance supported;walker, front-wheeled  -ES     Comment, Balance no losses of balance with functional mobility noted  -ES               User Key  (r) = Recorded By, (t) = Taken By, (c) = Cosigned By      Initials Name Provider Type    Yvonne Mcghee, OTR/L, FREDIS Occupational Therapist                   Goals/Plan    No documentation.                  Clinical Impression       Row Name 02/21/25 0932          Pain Assessment    Pre/Posttreatment Pain Comment patient with no complaints of pain  -ES       Row Name 02/21/25 0943          Plan of Care Review    Plan of Care Reviewed With patient  -ES     Progress improving  -ES     Outcome Evaluation Patient with good participation in OT intervention this AM. Therpeutic  intervention focused on improving patient standing tolerance in preperation for ADL engagement and routine at time of discharge through completion of functional mobility, toilet transfer, and standing engagement at sinkside. Patient with improved tolerance, tolerating increased time in stance without rest breaks. Patient continues with elevated HR, sustaining in 130s with therapy engagement, RN aware. Patient is CGA with all transfers and mobiilty, no losses of balance demonstrated. Patient will benefit from continued skilled OT intervention to promote return to baseline. Continue to recommend home with assist from caregiver at time of discharge.  -ES       Row Name 02/21/25 0935          Therapy Plan Review/Discharge Plan (OT)    Anticipated Discharge Disposition (OT) home with home health;home with assist  -ES       Row Name 02/21/25 0935          Vital Signs    Pretreatment Heart Rate (beats/min) 110  -ES     Intratreatment Heart Rate (beats/min) 130  -ES     Posttreatment Heart Rate (beats/min) 115  -ES       Row Name 02/21/25 0935          Positioning and Restraints    Pre-Treatment Position sitting in chair/recliner  -ES     Post Treatment Position chair  -ES     In Chair reclined;call light within reach;encouraged to call for assist;exit alarm on  -ES               User Key  (r) = Recorded By, (t) = Taken By, (c) = Cosigned By      Initials Name Provider Type    ES Yvonne Wood, OTR/L, CSRS Occupational Therapist                   Outcome Measures       Row Name 02/21/25 0986          How much help from another is currently needed...    Putting on and taking off regular lower body clothing? 3  -ES     Bathing (including washing, rinsing, and drying) 3  -ES     Toileting (which includes using toilet bed pan or urinal) 3  -ES     Putting on and taking off regular upper body clothing 3  -ES     Taking care of personal grooming (such as brushing teeth) 4  -ES     Eating meals 4  -ES     AM-PAC 6 Clicks Score  (OT) 20  -ES       Row Name 02/21/25 0936          How much help from another person do you currently need...    Turning from your back to your side while in flat bed without using bedrails? 4  -PB     Moving from lying on back to sitting on the side of a flat bed without bedrails? 4  -PB     Moving to and from a bed to a chair (including a wheelchair)? 4  -PB     Standing up from a chair using your arms (e.g., wheelchair, bedside chair)? 3  -PB     Climbing 3-5 steps with a railing? 3  -PB     To walk in hospital room? 3  -PB     AM-PAC 6 Clicks Score (PT) 21  -PB     Highest Level of Mobility Goal 6 --> Walk 10 steps or more  -PB       Row Name 02/21/25 0938          Functional Assessment    Outcome Measure Options AM-PAC 6 Clicks Daily Activity (OT)  -ES               User Key  (r) = Recorded By, (t) = Taken By, (c) = Cosigned By      Initials Name Provider Type    PB Nevaeh Haq, RN Registered Nurse    Yvonne Mcghee, OTR/L, CSRS Occupational Therapist                    Occupational Therapy Education       Title: PT OT SLP Therapies (Done)       Topic: Occupational Therapy (Done)       Point: ADL training (Done)       Description:   Instruct learner(s) on proper safety adaptation and remediation techniques during self care or transfers.   Instruct in proper use of assistive devices.                  Learning Progress Summary            Patient Acceptance, E,TB, VU by PB at 2/20/2025 1448    Eager, E, VU,NR by  at 2/20/2025 1238    Acceptance, E,TB, VU by  at 2/16/2025 1142    Comment: Instructed in role of OT, discharge planing, home safety, fall prevention, use of call light                      Point: Home exercise program (Done)       Description:   Instruct learner(s) on appropriate technique for monitoring, assisting and/or progressing therapeutic exercises/activities.                  Learning Progress Summary            Patient Acceptance, E,TB, VU by PB at 2/20/2025 1448    Eager, E, VU,NR by   at 2/20/2025 1238    Acceptance, E,TB, VU by  at 2/16/2025 1142    Comment: Instructed in role of OT, discharge planing, home safety, fall prevention, use of call light                      Point: Precautions (Done)       Description:   Instruct learner(s) on prescribed precautions during self-care and functional transfers.                  Learning Progress Summary            Patient Acceptance, E,TB, VU by PB at 2/20/2025 1448    Eager, E, VU,NR by  at 2/20/2025 1238    Acceptance, E,TB, VU by  at 2/16/2025 1142    Comment: Instructed in role of OT, discharge planing, home safety, fall prevention, use of call light                      Point: Body mechanics (Done)       Description:   Instruct learner(s) on proper positioning and spine alignment during self-care, functional mobility activities and/or exercises.                  Learning Progress Summary            Patient Acceptance, E,TB, VU by PB at 2/20/2025 1448    Eager, E, VU,NR by  at 2/20/2025 1238    Acceptance, E,TB, VU by  at 2/16/2025 1142    Comment: Instructed in role of OT, discharge planing, home safety, fall prevention, use of call light                                      User Key       Initials Effective Dates Name Provider Type Discipline     07/11/23 -  Lizette Walsh, PT Physical Therapist PT     02/09/24 -  Nevaeh Haq RN Registered Nurse Nurse     08/31/23 -  Daja Seo OT Occupational Therapist OT                  OT Recommendation and Plan     Plan of Care Review  Plan of Care Reviewed With: patient  Progress: improving  Outcome Evaluation: Patient with good participation in OT intervention this AM. Therpeutic intervention focused on improving patient standing tolerance in preperation for ADL engagement and routine at time of discharge through completion of functional mobility, toilet transfer, and standing engagement at sinkside. Patient with improved tolerance, tolerating increased time in stance without  rest breaks. Patient continues with elevated HR, sustaining in 130s with therapy engagement, RN aware. Patient is CGA with all transfers and mobiilty, no losses of balance demonstrated. Patient will benefit from continued skilled OT intervention to promote return to baseline. Continue to recommend home with assist from caregiver at time of discharge.     Time Calculation:         Time Calculation- OT       Row Name 02/21/25 0938             Time Calculation- OT    OT Start Time 0854  -ES      OT Stop Time 0919  -ES      OT Time Calculation (min) 25 min  -ES      Total Timed Code Minutes- OT 25 minute(s)  -ES      OT Received On 02/21/25  -ES      OT - Next Appointment 02/24/25  -ES         Timed Charges    01778 - OT Therapeutic Activity Minutes 10  -ES      32431 - OT Self Care/Mgmt Minutes 15  -ES         Total Minutes    Timed Charges Total Minutes 25  -ES       Total Minutes 25  -ES                User Key  (r) = Recorded By, (t) = Taken By, (c) = Cosigned By      Initials Name Provider Type    ES Yvonne Wood, OTR/L, CSRS Occupational Therapist                  Therapy Charges for Today       Code Description Service Date Service Provider Modifiers Qty    41009649263 HC OT THERAPEUTIC ACT EA 15 MIN 2/21/2025 Yvonne Wood, OTR/L, CSRS GO 1    73864940756 HC OT SELF CARE/MGMT/TRAIN EA 15 MIN 2/21/2025 Yvonne Wood, OTR/L, CSRS GO 1                 Yvonne Wood OTR/L, CSRS  2/21/2025

## 2025-02-21 NOTE — CASE MANAGEMENT/SOCIAL WORK
Continued Stay Note  [facility]     Patient Name: Madelene Naegele  MRN: [id number]  Today's Date: 2/21/2025    Admit Date: 2/15/2025    Plan: Plan skilled care at accepting facility- pre cert needed.  BRY Parker RN   Discharge Plan       Row Name 02/21/25 1152       Plan    Plan Plan skilled care at accepting facility- pre cert needed.  BRY Parker RN    Plan Comments Called (Jovita Ortega [phone]) to discussed skilled care facilities. Tomi's first choice is [facility].  Tomi does state pt has assets and long term insurance to assist with Assisted Living or LTC if needed. Lacy  [phone]) called to follow. Plan skilled care at accepting facility- pre cert needed. BRY Parker RN                   Discharge Codes    No documentation.                 Expected Discharge Date and Time       Expected Discharge Date Expected Discharge Time    Feb 21, 2025               Rachel Parker RN

## 2025-02-21 NOTE — PLAN OF CARE
Goal Outcome Evaluation:  Plan of Care Reviewed With: patient        Progress: improving  Outcome Evaluation: Patient with good participation in OT intervention this AM. Therpeutic intervention focused on improving patient standing tolerance in preperation for ADL engagement and routine at time of discharge through completion of functional mobility, toilet transfer, and standing engagement at sinkside. Patient with improved tolerance, tolerating increased time in stance without rest breaks. Patient continues with elevated HR, sustaining in 130s with therapy engagement, RN aware. Patient is CGA with all transfers and mobiilty, no losses of balance demonstrated. Patient will benefit from continued skilled OT intervention to promote return to baseline. Continue to recommend home with assist from caregiver at time of discharge.    Anticipated Discharge Disposition (OT): home with home health, home with assist

## 2025-02-21 NOTE — PROGRESS NOTES
Name: Madelene Naegele ADMIT: 2/15/2025   : 1940  PCP: Ava Sebastian MD    MRN: [id number] LOS: 5 days   AGE/SEX: 84 y.o. female  ROOM: [facility]     Subjective   Subjective   No new complaints     Objective   Objective   Vital Signs  Temp:  [97.7 °F (36.5 °C)-98.7 °F (37.1 °C)] 97.8 °F (36.6 °C)  Heart Rate:  [] 112  Resp:  [18] 18  BP: (102-119)/(77-97) 117/79  SpO2:  [96 %-99 %] 99 %  on   ;   Device (Oxygen Therapy): room air  Body mass index is 29.35 kg/m².    Physical Exam  Constitutional:       General: She is not in acute distress.     Appearance: She is not toxic-appearing.   Cardiovascular:      Rate and Rhythm: Tachycardia present. Rhythm irregular.   Pulmonary:      Effort: Pulmonary effort is normal.      Breath sounds: Wheezing present.   Abdominal:      General: Bowel sounds are normal.      Palpations: Abdomen is soft.      Tenderness: There is no abdominal tenderness.   Musculoskeletal:         General: No swelling.   Skin:     General: Skin is warm and dry.   Neurological:      Mental Status: She is alert. Mental status is at baseline.      Cranial Nerves: No facial asymmetry.     Results Review  I reviewed the patient's new clinical results.  Results from last 7 days   Lab Units 25  0706 25  0443 25  0600 25  0558   WBC 10*3/mm3 8.06 8.02 9.12 10.18   HEMOGLOBIN g/dL 15.4 13.6 13.9 13.7   PLATELETS 10*3/mm3 159 147 145 135*     Results from last 7 days   Lab Units 25  0706 25  0442 25  0600 25  0558 25  0602 02/15/25  1744   SODIUM mmol/L 140 138 137 139 140 138   POTASSIUM mmol/L 4.6 5.4* 4.9 4.5 4.0 4.4   CHLORIDE mmol/L 102 107 105 102 103 101   CO2 mmol/L 28.1 24.0 24.0 27.1 27.0 24.0   BUN mg/dL 25* 21 15 24* 24* 27*   CREATININE mg/dL 1.29* 1.21* 1.07* 1.17* 1.20* 1.38*   GLUCOSE mg/dL 145* 104* 156* 164* 158* 198*   EGFR mL/min/1.73 41.3* 44.6* 51.6* 46.4* 45.0* 38.1*   ALBUMIN g/dL  --   --   --   --   --  4.0    BILIRUBIN mg/dL  --   --   --   --   --  0.9   ALK PHOS U/L  --   --   --   --   --  62   AST (SGOT) U/L  --   --   --   --   --  16   ALT (SGPT) U/L  --   --   --   --   --  12     Results from last 7 days   Lab Units 02/20/25  0706 02/19/25  0442 02/18/25  0600 02/17/25  0558 02/16/25  0602 02/15/25  1744   CALCIUM mg/dL 9.3 8.8 8.9 8.8   < > 9.3   ALBUMIN g/dL  --   --   --   --   --  4.0   MAGNESIUM mg/dL 2.0 2.2 2.4 2.0  --  1.8   PHOSPHORUS mg/dL 4.3 3.3 3.4 3.0  --   --     < > = values in this interval not displayed.     Results from last 7 days   Lab Units 02/15/25  2048 02/15/25  1744   LACTATE mmol/L 1.6 2.9*     Glucose   Date/Time Value Ref Range Status   02/21/2025 0603 132 (H) 70 - 130 mg/dL Final   02/20/2025 2056 220 (H) 70 - 130 mg/dL Final   02/20/2025 1659 96 70 - 130 mg/dL Final   02/20/2025 1021 273 (H) 70 - 130 mg/dL Final   02/20/2025 0705 127 70 - 130 mg/dL Final   02/19/2025 2109 175 (H) 70 - 130 mg/dL Final   02/19/2025 1529 139 (H) 70 - 130 mg/dL Final       No radiology results for the last day    Scheduled Meds  apixaban, 5 mg, Oral, BID  aspirin, 81 mg, Oral, Daily  atorvastatin, 40 mg, Oral, Daily  cholecalciferol, 2,000 Units, Oral, Daily  dilTIAZem CD, 240 mg, Oral, Q24H  divalproex, 125 mg, Oral, BID  gabapentin, 100 mg, Oral, BID  insulin lispro, 2-7 Units, Subcutaneous, 4x Daily AC & at Bedtime  lisinopril, 5 mg, Oral, Daily  metoprolol succinate XL, 100 mg, Oral, Q12H  potassium chloride, 40 mEq, Oral, Daily  sertraline, 25 mg, Oral, Daily  torsemide, 20 mg, Oral, Daily    Continuous Infusions     PRN Meds    acetaminophen **OR** [DISCONTINUED] acetaminophen **OR** [DISCONTINUED] acetaminophen    senna-docusate sodium **AND** polyethylene glycol **AND** bisacodyl **AND** bisacodyl    dextrose    dextrose    glucagon (human recombinant)    hydrOXYzine    melatonin    ondansetron ODT **OR** ondansetron       Diet  Diet: Cardiac; Healthy Heart (2-3 Na+); Fluid Consistency: Thin  (IDDSI 0)       Assessment/Plan     Active Hospital Problems    Diagnosis  POA    **Acute encephalopathy [G93.40]  Yes    CKD (chronic kidney disease) stage 3, GFR 30-59 ml/min [N18.30]  Unknown    Altered mental status [R41.82]  Yes    Atrial fibrillation, persistent [I48.19]  Yes    CAD (coronary artery disease) [I25.10]  Yes    History of stroke [Z86.73]  Not Applicable    HTN (hypertension) [I10]  Yes    Hyperlipidemia [E78.5]  Yes    Type 2 diabetes mellitus, without long-term current use of insulin [E11.9]  Yes      Resolved Hospital Problems   No resolved problems to display.       84 y.o. female with Acute encephalopathy.    Remains afebrile  Slightly tachycardic with chronic A-fib  No labs today  Blood sugar stable  /79, lisinopril restarted yesterday      There was some confusion about her home Toprol dose.  This was corrected and she received proper dose last evening and again this morning.  Expect her heart rate to improve.  Already on apixaban.    CCP working on discharge arrangements.      Fall  Altered mental status  History of stroke 2022 with aphasia  CT age-indeterminate infarct left frontal, chronic infarcts  MRI no acute stroke   B12 and folate okay  She seems to be baseline  PT recommends SNF    NO evidence of FOUZIA  CKD 3a  FOUZIA ruled out, creatinine near baseline    Atrial fibrillation  Back on home Cardizem and Toprol.  Continue Eliquis    Diabetes mellitus type 2  Metformin on hold.   Continue correctional insulin  A1c 7.8% in May 2024    Hypertension   Restarting meds as BP tolerates      SCDs for DVT prophylaxis.  Full code.  Discussed with patient and care team on multidisciplinary rounds.  Anticipate discharge to SNU facility once arrangements have been made.  Expected Discharge Date: 2/21/2025; Expected Discharge Time:       Dave Silva MD  [facility]  02/21/25  10:17 EST    ADDENDUM  BP soft this afternoon.  Will discontinue lisinopril and torsemide/Kcl.   Holding parameters in place for Cardizem and Toprol.    Electronically signed by Dave Silva MD, 02/21/25, 4:09 PM EST.

## 2025-02-22 LAB
GLUCOSE BLDC GLUCOMTR-MCNC: 125 MG/DL (ref 70–130)
GLUCOSE BLDC GLUCOMTR-MCNC: 129 MG/DL (ref 70–130)
GLUCOSE BLDC GLUCOMTR-MCNC: 207 MG/DL (ref 70–130)
GLUCOSE BLDC GLUCOMTR-MCNC: 243 MG/DL (ref 70–130)

## 2025-02-22 PROCEDURE — 82948 REAGENT STRIP/BLOOD GLUCOSE: CPT

## 2025-02-22 PROCEDURE — 63710000001 INSULIN LISPRO (HUMAN) PER 5 UNITS: Performed by: INTERNAL MEDICINE

## 2025-02-22 PROCEDURE — 97530 THERAPEUTIC ACTIVITIES: CPT

## 2025-02-22 RX ADMIN — ASPIRIN 81 MG: 81 TABLET, COATED ORAL at 10:08

## 2025-02-22 RX ADMIN — APIXABAN 5 MG: 5 TABLET, FILM COATED ORAL at 10:08

## 2025-02-22 RX ADMIN — INSULIN LISPRO 3 UNITS: 100 INJECTION, SOLUTION INTRAVENOUS; SUBCUTANEOUS at 11:46

## 2025-02-22 RX ADMIN — METOPROLOL SUCCINATE 100 MG: 100 TABLET, EXTENDED RELEASE ORAL at 20:17

## 2025-02-22 RX ADMIN — DILTIAZEM HYDROCHLORIDE 240 MG: 120 CAPSULE, EXTENDED RELEASE ORAL at 10:08

## 2025-02-22 RX ADMIN — APIXABAN 5 MG: 5 TABLET, FILM COATED ORAL at 20:17

## 2025-02-22 RX ADMIN — INSULIN LISPRO 3 UNITS: 100 INJECTION, SOLUTION INTRAVENOUS; SUBCUTANEOUS at 20:53

## 2025-02-22 RX ADMIN — ATORVASTATIN CALCIUM 40 MG: 20 TABLET, FILM COATED ORAL at 10:08

## 2025-02-22 RX ADMIN — DIVALPROEX SODIUM 125 MG: 125 TABLET, DELAYED RELEASE ORAL at 20:17

## 2025-02-22 RX ADMIN — Medication 2000 UNITS: at 10:08

## 2025-02-22 RX ADMIN — SERTRALINE HYDROCHLORIDE 25 MG: 50 TABLET, FILM COATED ORAL at 10:08

## 2025-02-22 RX ADMIN — GABAPENTIN 100 MG: 100 CAPSULE ORAL at 20:17

## 2025-02-22 RX ADMIN — DIVALPROEX SODIUM 125 MG: 125 TABLET, DELAYED RELEASE ORAL at 10:08

## 2025-02-22 RX ADMIN — GABAPENTIN 100 MG: 100 CAPSULE ORAL at 10:08

## 2025-02-22 NOTE — PLAN OF CARE
Goal Outcome Evaluation:  Plan of Care Reviewed With: patient        Progress: improving  Outcome Evaluation: Pt is cont to improve with act sabino, mobility, and transfer safety to RWX to amb and to transfer to chair    Anticipated Discharge Disposition (PT): skilled nursing facility, home with home health, home with /7 care                      Therapist used appropriate personal protective equipment.  Appropriate PPE was worn during the entire therapy session. Hand hygiene was completed before and after therapy session. Patient is not in enhanced precautions.

## 2025-02-22 NOTE — PLAN OF CARE
Goal Outcome Evaluation:  Plan of Care Reviewed With: patient        Progress: improving  Outcome Evaluation: pt ao x self time and place, afib on monitor, vss, up w/ x1 assist to bathroom and chair, no c/o pain this shift, bp soft-- md aware metoprolol held this am, meds given per mar

## 2025-02-22 NOTE — THERAPY TREATMENT NOTE
Patient Name: Madelene Naegele  : 1940    MRN: [id number]                              Today's Date: 2025       Admit Date: 2/15/2025    Visit Dx:     ICD-10-CM ICD-9-CM   1. Acute encephalopathy  G93.40 348.30   2. Acute kidney injury  N17.9 584.9   3. Abnormal head CT  R93.0 793.0   4. Fall, initial encounter  W19.XXXA E888.9   5. Chronic anticoagulation  Z79.01 V58.61   6. Laceration of right hand without foreign body, initial encounter  S61.411A 882.0     Patient Active Problem List   Diagnosis    Cellulitis of left leg    Severe sepsis    HTN (hypertension)    Atrial fibrillation, persistent    Hyperlipidemia    History of stroke    CAD (coronary artery disease)    Type 2 diabetes mellitus, without long-term current use of insulin    Acute encephalopathy    Altered mental status    CKD (chronic kidney disease) stage 3, GFR 30-59 ml/min     Past Medical History:   Diagnosis Date    CAD (coronary artery disease)     History of stroke     Hyperlipidemia     Hypertension     Persistent atrial fibrillation     Type 2 diabetes mellitus      History reviewed. No pertinent surgical history.   General Information       Row Name 25 1433          Physical Therapy Time and Intention    Document Type therapy note (daily note)  -CW     Mode of Treatment physical therapy  -CW       Row Name 25 1433          General Information    Existing Precautions/Restrictions fall  -CW       Row Name 25 1433          Cognition    Orientation Status (Cognition) oriented x 3  -CW       Row Name 25 1433          Safety Issues/Impairments Affecting Functional Mobility    Impairments Affecting Function (Mobility) endurance/activity tolerance;strength;balance  -CW               User Key  (r) = Recorded By, (t) = Taken By, (c) = Cosigned By      Initials Name Provider Type    CW Jovani Castillo, PTA Physical Therapist Assistant                   Mobility       Row Name 25 1433          Bed Mobility     Supine-Sit [place] (Bed Mobility) not tested  -CW     Comment, (Bed Mobility) UIC  -CW       Row Name 02/22/25 1433          Sit-Stand Transfer    Sit-Stand [place] (Transfers) contact guard  -CW     Assistive Device (Sit-Stand Transfers) walker, front-wheeled  -CW       Row Name 02/22/25 1433          Gait/Stairs (Locomotion)    [place] Level (Gait) contact guard  -CW     Assistive Device (Gait) walker, standard  -CW     Distance in Feet (Gait) 15  -CW     Deviations/Abnormal Patterns (Gait) festinating/shuffling;gait speed decreased;stride length decreased  -CW     Bilateral Gait Deviations heel strike decreased;forward flexed posture  -CW               User Key  (r) = Recorded By, (t) = Taken By, (c) = Cosigned By      Initials Name Provider Type    CW Jovani Castillo, PTA Physical Therapist Assistant                   Obj/Interventions    No documentation.                  Goals/Plan    No documentation.                  Clinical Impression       Row Name 02/22/25 1434          Pain    Pretreatment Pain Rating 0/10 - no pain  -CW     Posttreatment Pain Rating 0/10 - no pain  -CW     Pain Management Interventions exercise or physical activity utilized  -CW     Response to Pain Interventions activity participation with tolerable pain  -CW       Row Name 02/22/25 1434          Plan of Care Review    Plan of Care Reviewed With patient  -CW     Progress improving  -CW     Outcome Evaluation Pt is cont to improve with act sabino, mobility, and transfer safety to RWX to amb and to transfer to chair  -CW       Row Name 02/22/25 1434          Therapy Assessment/Plan (PT)    Rehab Potential (PT) fair  -CW     Criteria for Skilled Interventions Met (PT) yes  -CW     Therapy Frequency (PT) 5 times/wk  -CW       Row Name 02/22/25 1434          Vital Signs    O2 Delivery Pre Treatment room air  -CW       Row Name 02/22/25 1434          Positioning and Restraints    Pre-Treatment Position sitting in  chair/recliner  -CW     Post Treatment Position chair  -CW     In Chair reclined;call light within reach;encouraged to call for assist;exit alarm on  -CW               User Key  (r) = Recorded By, (t) = Taken By, (c) = Cosigned By      Initials Name Provider Type    Jovani Hodge PTA Physical Therapist Assistant                   Outcome Measures       Row Name 02/22/25 1435 02/22/25 0830       How much help from another person do you currently need...    Turning from your back to your side while in flat bed without using bedrails? 4  -CW 4  -JK    Moving from lying on back to sitting on the side of a flat bed without bedrails? 4  -CW 4  -JK    Moving to and from a bed to a chair (including a wheelchair)? 4  -CW 4  -JK    Standing up from a chair using your arms (e.g., wheelchair, bedside chair)? 3  -CW 3  -JK    Climbing 3-5 steps with a railing? 3  -CW 3  -JK    To walk in hospital room? 3  -CW 3  -JK    AM-PAC 6 Clicks Score (PT) 21  -CW 21  -JK    Highest Level of Mobility Goal 6 --> Walk 10 steps or more  -CW 6 --> Walk 10 steps or more  -JK      Row Name 02/22/25 1435          Functional Assessment    Outcome Measure Options AM-PAC 6 Clicks Basic Mobility (PT)  -CW               User Key  (r) = Recorded By, (t) = Taken By, (c) = Cosigned By      Initials Name Provider Type    Jovani Hodge PTA Physical Therapist Assistant    Rosibel Patterson RN Registered Nurse                                 Physical Therapy Education       Title: PT OT SLP Therapies (Done)       Topic: Physical Therapy (Done)       Point: Mobility training (Done)       Learning Progress Summary            Patient Acceptance, E,TB, VU by PB at 2/21/2025 1614    Acceptance, E,TB, VU by PB at 2/20/2025 1448    Eager, E, VU,NR by SV at 2/20/2025 1238    Acceptance, E,TB,D, DU,NR by PH at 2/19/2025 1109                      Point: Home exercise program (Done)       Learning Progress Summary            Patient  Acceptance, E,TB, VU by PB at 2/21/2025 1614    Acceptance, E,TB, VU by PB at 2/20/2025 1448    Eager, E, VU,NR by SV at 2/20/2025 1238    Acceptance, E,TB,D, DU,NR by PH at 2/19/2025 1109                      Point: Body mechanics (Done)       Learning Progress Summary            Patient Acceptance, E,TB, VU by PB at 2/21/2025 1614    Acceptance, E,TB, VU by PB at 2/20/2025 1448    Eager, E, VU,NR by SV at 2/20/2025 1238    Acceptance, E,TB,D, DU,NR by PH at 2/19/2025 1109                      Point: Precautions (Done)       Learning Progress Summary            Patient Acceptance, E,TB, VU by PB at 2/21/2025 1614    Acceptance, E,TB, VU by PB at 2/20/2025 1448    Eager, E, VU,NR by SV at 2/20/2025 1238    Acceptance, E,TB,D, DU,NR by PH at 2/19/2025 1109                                      User Key       Initials Effective Dates Name Provider Type Discipline    SV 07/11/23 -  Lizette Walsh PT Physical Therapist PT    PB 02/09/24 -  Nevaeh Haq RN Registered Nurse Nurse     06/16/21 -  Annie Mckee PTA Physical Therapist Assistant PT                  PT Recommendation and Plan     Progress: improving  Outcome Evaluation: Pt is cont to improve with act sabino, mobility, and transfer safety to RWX to amb and to transfer to chair     Time Calculation:         PT Charges       Row Name 02/22/25 1436             Time Calculation    Start Time 1351  -CW      Stop Time 1410  -CW      Time Calculation (min) 19 min  -CW      PT Received On 02/22/25  -CW      PT - Next Appointment 02/24/25  -CW         Timed Charges    63258 - PT Therapeutic Activity Minutes 19  -CW         Total Minutes    Timed Charges Total Minutes 19  -CW       Total Minutes 19  -CW                User Key  (r) = Recorded By, (t) = Taken By, (c) = Cosigned By      Initials Name Provider Type    CW Jovani Castillo, PTA Physical Therapist Assistant                  Therapy Charges for Today       Code Description Service Date Service  Provider Modifiers Qty    [id number]  PT THERAPEUTIC ACT EA 15 MIN 2/22/2025 Jovani Castillo, PTA GP 1            PT G-Codes  Outcome Measure Options: AM-PAC 6 Clicks Basic Mobility (PT)  AM-PAC 6 Clicks Score (PT): 21  AM-PAC 6 Clicks Score (OT): 20  PT Discharge Summary  Anticipated Discharge Disposition (PT): skilled nursing facility, home with home health, home with 24/7 care    Jovani Castillo, AYDEE  2/22/2025

## 2025-02-22 NOTE — PROGRESS NOTES
Name: Madelene Naegele ADMIT: 2/15/2025   : 1940  PCP: Ava Sebastian MD    MRN: [id number] LOS: 6 days   AGE/SEX: 84 y.o. female  ROOM: [facility]     Subjective   Subjective   No new complaints. Examined at bedside.      Objective   Objective   Vital Signs  Temp:  [97.3 °F (36.3 °C)-98.4 °F (36.9 °C)] 98.4 °F (36.9 °C)  Heart Rate:  [] 85  Resp:  [16-20] 18  BP: ()/(53-76) 99/75  SpO2:  [92 %-100 %] 97 %  on   ;   Device (Oxygen Therapy): room air  Body mass index is 28.87 kg/m².    Physical Exam  Constitutional:       General: She is not in acute distress.     Appearance: She is not toxic-appearing.   Eyes:      Extraocular Movements: Extraocular movements intact.      Pupils: Pupils are equal, round, and reactive to light.   Cardiovascular:      Rate and Rhythm: Tachycardia present. Rhythm irregular.   Pulmonary:      Effort: Pulmonary effort is normal. No respiratory distress.   Abdominal:      General: Bowel sounds are normal.      Palpations: Abdomen is soft.      Tenderness: There is no abdominal tenderness.   Musculoskeletal:         General: No swelling.   Skin:     General: Skin is warm and dry.   Neurological:      Mental Status: She is alert. Mental status is at baseline.      Cranial Nerves: No facial asymmetry.     Results Review  I reviewed the patient's new clinical results.  Results from last 7 days   Lab Units 25  0706 25  0443 25  0625  0558   WBC 10*3/mm3 8.06 8.02 9.12 10.18   HEMOGLOBIN g/dL 15.4 13.6 13.9 13.7   PLATELETS 10*3/mm3 159 147 145 135*     Results from last 7 days   Lab Units 25  0706 25  0442 25  0625  0558 25  0602 02/15/25  1744   SODIUM mmol/L 140 138 137 139 140 138   POTASSIUM mmol/L 4.6 5.4* 4.9 4.5 4.0 4.4   CHLORIDE mmol/L 102 107 105 102 103 101   CO2 mmol/L 28.1 24.0 24.0 27.1 27.0 24.0   BUN mg/dL 25* 21 15 24* 24* 27*   CREATININE mg/dL 1.29* 1.21* 1.07* 1.17* 1.20* 1.38*   GLUCOSE mg/dL  145* 104* 156* 164* 158* 198*   EGFR mL/min/1.73 41.3* 44.6* 51.6* 46.4* 45.0* 38.1*   ALBUMIN g/dL  --   --   --   --   --  4.0   BILIRUBIN mg/dL  --   --   --   --   --  0.9   ALK PHOS U/L  --   --   --   --   --  62   AST (SGOT) U/L  --   --   --   --   --  16   ALT (SGPT) U/L  --   --   --   --   --  12     Results from last 7 days   Lab Units 02/20/25  0706 02/19/25  0442 02/18/25  0600 02/17/25  0558 02/16/25  0602 02/15/25  1744   CALCIUM mg/dL 9.3 8.8 8.9 8.8   < > 9.3   ALBUMIN g/dL  --   --   --   --   --  4.0   MAGNESIUM mg/dL 2.0 2.2 2.4 2.0  --  1.8   PHOSPHORUS mg/dL 4.3 3.3 3.4 3.0  --   --     < > = values in this interval not displayed.     Results from last 7 days   Lab Units 02/15/25  2048 02/15/25  1744   LACTATE mmol/L 1.6 2.9*     Glucose   Date/Time Value Ref Range Status   02/22/2025 1031 243 (H) 70 - 130 mg/dL Final   02/22/2025 0606 129 70 - 130 mg/dL Final   02/21/2025 2021 105 70 - 130 mg/dL Final   02/21/2025 1614 248 (H) 70 - 130 mg/dL Final   02/21/2025 1101 278 (H) 70 - 130 mg/dL Final   02/21/2025 0603 132 (H) 70 - 130 mg/dL Final   02/20/2025 2056 220 (H) 70 - 130 mg/dL Final       No radiology results for the last day    Scheduled Meds  apixaban, 5 mg, Oral, BID  aspirin, 81 mg, Oral, Daily  atorvastatin, 40 mg, Oral, Daily  cholecalciferol, 2,000 Units, Oral, Daily  dilTIAZem CD, 240 mg, Oral, Q24H  divalproex, 125 mg, Oral, BID  gabapentin, 100 mg, Oral, BID  insulin lispro, 2-7 Units, Subcutaneous, 4x Daily AC & at Bedtime  metoprolol succinate XL, 100 mg, Oral, Q12H  sertraline, 25 mg, Oral, Daily    Continuous Infusions     PRN Meds    acetaminophen **OR** [DISCONTINUED] acetaminophen **OR** [DISCONTINUED] acetaminophen    senna-docusate sodium **AND** polyethylene glycol **AND** bisacodyl **AND** bisacodyl    dextrose    dextrose    glucagon (human recombinant)    hydrOXYzine    melatonin    ondansetron ODT **OR** ondansetron       Diet  Diet: Cardiac; Healthy Heart (2-3  Na+); Fluid Consistency: Thin (IDDSI 0)       Assessment/Plan     Active Hospital Problems    Diagnosis  POA    **Acute encephalopathy [G93.40]  Yes    CKD (chronic kidney disease) stage 3, GFR 30-59 ml/min [N18.30]  Unknown    Altered mental status [R41.82]  Yes    Atrial fibrillation, persistent [I48.19]  Yes    CAD (coronary artery disease) [I25.10]  Yes    History of stroke [Z86.73]  Not Applicable    HTN (hypertension) [I10]  Yes    Hyperlipidemia [E78.5]  Yes    Type 2 diabetes mellitus, without long-term current use of insulin [E11.9]  Yes      Resolved Hospital Problems   No resolved problems to display.       84 y.o. female with Acute encephalopathy.    Fall  Altered mental status  History of stroke 2022 with aphasia  CT age-indeterminate infarct left frontal, chronic infarcts  MRI no acute stroke   B12 and folate okay  She seems to be baseline  PT recommends SNF    NO evidence of FOUZIA  CKD 3a  FOUZIA ruled out, creatinine near baseline    Atrial fibrillation  Back on home Cardizem and Toprol.  Continue Eliquis    Diabetes mellitus type 2  Metformin on hold.   Continue correctional insulin  A1c 7.8% in May 2024    Hypertension   Restarting meds as BP tolerates      SCDs for DVT prophylaxis.  Full code.  Discussed with patient and care team on multidisciplinary rounds.  Anticipate discharge to SNU facility once arrangements have been made.  Expected Discharge Date: 2/25/2025; Expected Discharge Time:       Dave Silva MD  [facility]  02/22/25  12:29 EST

## 2025-02-22 NOTE — PLAN OF CARE
Goal Outcome Evaluation:  Plan of Care Reviewed With: patient        Progress: improving  Outcome Evaluation: no complaints voiced, denies pain, metoprolol held due to low B/P,  Eyes closed at long interval, resting quietly in room

## 2025-02-23 LAB
GLUCOSE BLDC GLUCOMTR-MCNC: 130 MG/DL (ref 70–130)
GLUCOSE BLDC GLUCOMTR-MCNC: 140 MG/DL (ref 70–130)
GLUCOSE BLDC GLUCOMTR-MCNC: 176 MG/DL (ref 70–130)
GLUCOSE BLDC GLUCOMTR-MCNC: 232 MG/DL (ref 70–130)

## 2025-02-23 PROCEDURE — 63710000001 INSULIN LISPRO (HUMAN) PER 5 UNITS: Performed by: INTERNAL MEDICINE

## 2025-02-23 PROCEDURE — 82948 REAGENT STRIP/BLOOD GLUCOSE: CPT

## 2025-02-23 RX ADMIN — APIXABAN 5 MG: 5 TABLET, FILM COATED ORAL at 22:32

## 2025-02-23 RX ADMIN — ASPIRIN 81 MG: 81 TABLET, COATED ORAL at 09:29

## 2025-02-23 RX ADMIN — GABAPENTIN 100 MG: 100 CAPSULE ORAL at 22:32

## 2025-02-23 RX ADMIN — DIVALPROEX SODIUM 125 MG: 125 TABLET, DELAYED RELEASE ORAL at 09:30

## 2025-02-23 RX ADMIN — DILTIAZEM HYDROCHLORIDE 240 MG: 120 CAPSULE, EXTENDED RELEASE ORAL at 09:30

## 2025-02-23 RX ADMIN — INSULIN LISPRO 3 UNITS: 100 INJECTION, SOLUTION INTRAVENOUS; SUBCUTANEOUS at 12:19

## 2025-02-23 RX ADMIN — INSULIN LISPRO 2 UNITS: 100 INJECTION, SOLUTION INTRAVENOUS; SUBCUTANEOUS at 17:05

## 2025-02-23 RX ADMIN — METOPROLOL SUCCINATE 100 MG: 100 TABLET, EXTENDED RELEASE ORAL at 09:32

## 2025-02-23 RX ADMIN — APIXABAN 5 MG: 5 TABLET, FILM COATED ORAL at 09:30

## 2025-02-23 RX ADMIN — SERTRALINE HYDROCHLORIDE 25 MG: 50 TABLET, FILM COATED ORAL at 09:30

## 2025-02-23 RX ADMIN — ATORVASTATIN CALCIUM 40 MG: 20 TABLET, FILM COATED ORAL at 09:30

## 2025-02-23 RX ADMIN — GABAPENTIN 100 MG: 100 CAPSULE ORAL at 09:29

## 2025-02-23 RX ADMIN — METOPROLOL SUCCINATE 100 MG: 100 TABLET, EXTENDED RELEASE ORAL at 22:32

## 2025-02-23 RX ADMIN — DIVALPROEX SODIUM 125 MG: 125 TABLET, DELAYED RELEASE ORAL at 22:32

## 2025-02-23 RX ADMIN — Medication 2000 UNITS: at 09:30

## 2025-02-23 NOTE — PROGRESS NOTES
Name: Madelene Naegele ADMIT: 2/15/2025   : 1940  PCP: Ava Sebastian MD    MRN: [id number] LOS: 7 days   AGE/SEX: 84 y.o. female  ROOM: [facility]     Subjective   Subjective   States she is doing well and has no new complaints today     Objective   Objective   Vital Signs  Temp:  [97.3 °F (36.3 °C)-98.2 °F (36.8 °C)] 98.2 °F (36.8 °C)  Heart Rate:  [] 91  Resp:  [18] 18  BP: ()/(68-86) 99/75  SpO2:  [94 %-100 %] 98 %  on   ;   Device (Oxygen Therapy): room air  Body mass index is 28.24 kg/m².    Physical Exam  Constitutional:       General: She is not in acute distress.     Appearance: She is not toxic-appearing.   Eyes:      Extraocular Movements: Extraocular movements intact.      Pupils: Pupils are equal, round, and reactive to light.   Cardiovascular:      Rate and Rhythm: Normal rate. Rhythm irregular.   Pulmonary:      Effort: Pulmonary effort is normal. No respiratory distress.   Abdominal:      General: Bowel sounds are normal.      Palpations: Abdomen is soft.      Tenderness: There is no abdominal tenderness.   Musculoskeletal:         General: No swelling.   Skin:     General: Skin is warm and dry.   Neurological:      Mental Status: She is alert. Mental status is at baseline.      Cranial Nerves: No facial asymmetry.     Results Review  I reviewed the patient's new clinical results.  Results from last 7 days   Lab Units 25  0706 25  0625  0558   WBC 10*3/mm3 8.06 8.02 9.12 10.18   HEMOGLOBIN g/dL 15.4 13.6 13.9 13.7   PLATELETS 10*3/mm3 159 147 145 135*     Results from last 7 days   Lab Units 25  0706 25  0442 25  0625  0558   SODIUM mmol/L 140 138 137 139   POTASSIUM mmol/L 4.6 5.4* 4.9 4.5   CHLORIDE mmol/L 102 107 105 102   CO2 mmol/L 28.1 24.0 24.0 27.1   BUN mg/dL 25* 21 15 24*   CREATININE mg/dL 1.29* 1.21* 1.07* 1.17*   GLUCOSE mg/dL 145* 104* 156* 164*   EGFR mL/min/1.73 41.3* 44.6* 51.6* 46.4*     Results from  last 7 days   Lab Units 02/20/25  0706 02/19/25  0442 02/18/25  0600 02/17/25  0558   CALCIUM mg/dL 9.3 8.8 8.9 8.8   MAGNESIUM mg/dL 2.0 2.2 2.4 2.0   PHOSPHORUS mg/dL 4.3 3.3 3.4 3.0           Glucose   Date/Time Value Ref Range Status   02/23/2025 1033 232 (H) 70 - 130 mg/dL Final   02/23/2025 0544 140 (H) 70 - 130 mg/dL Final   02/22/2025 2018 207 (H) 70 - 130 mg/dL Final   02/22/2025 1531 125 70 - 130 mg/dL Final   02/22/2025 1031 243 (H) 70 - 130 mg/dL Final   02/22/2025 0606 129 70 - 130 mg/dL Final   02/21/2025 2021 105 70 - 130 mg/dL Final       No radiology results for the last day    Scheduled Meds  apixaban, 5 mg, Oral, BID  aspirin, 81 mg, Oral, Daily  atorvastatin, 40 mg, Oral, Daily  cholecalciferol, 2,000 Units, Oral, Daily  dilTIAZem CD, 240 mg, Oral, Q24H  divalproex, 125 mg, Oral, BID  gabapentin, 100 mg, Oral, BID  insulin lispro, 2-7 Units, Subcutaneous, 4x Daily AC & at Bedtime  metoprolol succinate XL, 100 mg, Oral, Q12H  sertraline, 25 mg, Oral, Daily    Continuous Infusions     PRN Meds    acetaminophen **OR** [DISCONTINUED] acetaminophen **OR** [DISCONTINUED] acetaminophen    senna-docusate sodium **AND** polyethylene glycol **AND** bisacodyl **AND** bisacodyl    dextrose    dextrose    glucagon (human recombinant)    hydrOXYzine    melatonin    ondansetron ODT **OR** ondansetron       Diet  Diet: Cardiac; Healthy Heart (2-3 Na+); Fluid Consistency: Thin (IDDSI 0)       Assessment/Plan     Active Hospital Problems    Diagnosis  POA    **Acute encephalopathy [G93.40]  Yes    CKD (chronic kidney disease) stage 3, GFR 30-59 ml/min [N18.30]  Unknown    Altered mental status [R41.82]  Yes    Atrial fibrillation, persistent [I48.19]  Yes    CAD (coronary artery disease) [I25.10]  Yes    History of stroke [Z86.73]  Not Applicable    HTN (hypertension) [I10]  Yes    Hyperlipidemia [E78.5]  Yes    Type 2 diabetes mellitus, without long-term current use of insulin [E11.9]  Yes      Resolved Hospital  Problems   No resolved problems to display.       84 y.o. female with Acute encephalopathy.    Fall  Altered mental status  History of stroke 2022 with aphasia  CT age-indeterminate infarct left frontal, chronic infarcts  MRI no acute stroke   B12 and folate okay  She seems to be baseline  PT recommends SNF    NO evidence of FOUZIA  CKD 3a  FOUZIA ruled out, creatinine near baseline    Atrial fibrillation  Back on home Cardizem and Toprol.  Continue Eliquis    Diabetes mellitus type 2  Metformin on hold.   Continue correctional insulin  A1c 7.8% in May 2024    Hypertension   Restarting meds as BP tolerates      Eliquis for  DVT prophylaxis.  Full code.  Discussed with patient and care team on multidisciplinary rounds.  Anticipate discharge to SNU facility once arrangements have been made.  Expected Discharge Date: 2/25/2025; Expected Discharge Time:       Dave Silva MD  [facility]  02/23/25  12:59 EST

## 2025-02-23 NOTE — PLAN OF CARE
Goal Outcome Evaluation:  Plan of Care Reviewed With: patient        Progress: improving  Outcome Evaluation: no complaints voiced, denies pain, continues in afib tachy at times, up to bsc with assist, tolerate well, eyes closed at long interval, resting quielty in room

## 2025-02-24 LAB
ANION GAP SERPL CALCULATED.3IONS-SCNC: 11 MMOL/L (ref 5–15)
BUN SERPL-MCNC: 25 MG/DL (ref 8–23)
BUN/CREAT SERPL: 21.9 (ref 7–25)
CALCIUM SPEC-SCNC: 8.8 MG/DL (ref 8.6–10.5)
CHLORIDE SERPL-SCNC: 102 MMOL/L (ref 98–107)
CO2 SERPL-SCNC: 28 MMOL/L (ref 22–29)
CREAT SERPL-MCNC: 1.14 MG/DL (ref 0.57–1)
DEPRECATED RDW RBC AUTO: 50.7 FL (ref 37–54)
EGFRCR SERPLBLD CKD-EPI 2021: 47.9 ML/MIN/1.73
ERYTHROCYTE [DISTWIDTH] IN BLOOD BY AUTOMATED COUNT: 12.8 % (ref 12.3–15.4)
GLUCOSE BLDC GLUCOMTR-MCNC: 112 MG/DL (ref 70–130)
GLUCOSE BLDC GLUCOMTR-MCNC: 144 MG/DL (ref 70–130)
GLUCOSE BLDC GLUCOMTR-MCNC: 199 MG/DL (ref 70–130)
GLUCOSE BLDC GLUCOMTR-MCNC: 269 MG/DL (ref 70–130)
GLUCOSE SERPL-MCNC: 180 MG/DL (ref 65–99)
HCT VFR BLD AUTO: 44.3 % (ref 34–46.6)
HGB BLD-MCNC: 15.4 G/DL (ref 12–15.9)
MCH RBC QN AUTO: 37.4 PG (ref 26.6–33)
MCHC RBC AUTO-ENTMCNC: 34.8 G/DL (ref 31.5–35.7)
MCV RBC AUTO: 107.5 FL (ref 79–97)
PLATELET # BLD AUTO: 220 10*3/MM3 (ref 140–450)
PMV BLD AUTO: 9.9 FL (ref 6–12)
POTASSIUM SERPL-SCNC: 4 MMOL/L (ref 3.5–5.2)
RBC # BLD AUTO: 4.12 10*6/MM3 (ref 3.77–5.28)
SODIUM SERPL-SCNC: 141 MMOL/L (ref 136–145)
WBC NRBC COR # BLD AUTO: 6.8 10*3/MM3 (ref 3.4–10.8)

## 2025-02-24 PROCEDURE — 85027 COMPLETE CBC AUTOMATED: CPT | Performed by: STUDENT IN AN ORGANIZED HEALTH CARE EDUCATION/TRAINING PROGRAM

## 2025-02-24 PROCEDURE — 82948 REAGENT STRIP/BLOOD GLUCOSE: CPT

## 2025-02-24 PROCEDURE — 80048 BASIC METABOLIC PNL TOTAL CA: CPT | Performed by: STUDENT IN AN ORGANIZED HEALTH CARE EDUCATION/TRAINING PROGRAM

## 2025-02-24 PROCEDURE — 63710000001 INSULIN LISPRO (HUMAN) PER 5 UNITS: Performed by: INTERNAL MEDICINE

## 2025-02-24 RX ORDER — FLUOROMETHOLONE 1 MG/ML
1 SUSPENSION/ DROPS OPHTHALMIC 3 TIMES DAILY
Status: DISCONTINUED | OUTPATIENT
Start: 2025-02-24 | End: 2025-02-26 | Stop reason: HOSPADM

## 2025-02-24 RX ORDER — FLUOROMETHOLONE 1 MG/ML
1 SUSPENSION/ DROPS OPHTHALMIC 2 TIMES DAILY
Status: DISCONTINUED | OUTPATIENT
Start: 2025-02-24 | End: 2025-02-24

## 2025-02-24 RX ADMIN — ACETAMINOPHEN 325MG 650 MG: 325 TABLET ORAL at 20:57

## 2025-02-24 RX ADMIN — DIVALPROEX SODIUM 125 MG: 125 TABLET, DELAYED RELEASE ORAL at 20:57

## 2025-02-24 RX ADMIN — FLUOROMETHOLONE 1 DROP: 1 SUSPENSION/ DROPS OPHTHALMIC at 22:47

## 2025-02-24 RX ADMIN — METOPROLOL SUCCINATE 100 MG: 100 TABLET, EXTENDED RELEASE ORAL at 09:25

## 2025-02-24 RX ADMIN — INSULIN LISPRO 2 UNITS: 100 INJECTION, SOLUTION INTRAVENOUS; SUBCUTANEOUS at 17:47

## 2025-02-24 RX ADMIN — Medication 2000 UNITS: at 09:26

## 2025-02-24 RX ADMIN — GABAPENTIN 100 MG: 100 CAPSULE ORAL at 09:25

## 2025-02-24 RX ADMIN — ASPIRIN 81 MG: 81 TABLET, COATED ORAL at 09:25

## 2025-02-24 RX ADMIN — DIVALPROEX SODIUM 125 MG: 125 TABLET, DELAYED RELEASE ORAL at 09:25

## 2025-02-24 RX ADMIN — SERTRALINE HYDROCHLORIDE 25 MG: 50 TABLET, FILM COATED ORAL at 09:25

## 2025-02-24 RX ADMIN — INSULIN LISPRO 4 UNITS: 100 INJECTION, SOLUTION INTRAVENOUS; SUBCUTANEOUS at 12:08

## 2025-02-24 RX ADMIN — APIXABAN 5 MG: 5 TABLET, FILM COATED ORAL at 09:26

## 2025-02-24 RX ADMIN — ATORVASTATIN CALCIUM 40 MG: 20 TABLET, FILM COATED ORAL at 09:26

## 2025-02-24 RX ADMIN — DILTIAZEM HYDROCHLORIDE 240 MG: 120 CAPSULE, EXTENDED RELEASE ORAL at 09:25

## 2025-02-24 RX ADMIN — FLUOROMETHOLONE 1 DROP: 1 SUSPENSION/ DROPS OPHTHALMIC at 17:29

## 2025-02-24 RX ADMIN — GABAPENTIN 100 MG: 100 CAPSULE ORAL at 20:57

## 2025-02-24 RX ADMIN — METOPROLOL SUCCINATE 100 MG: 100 TABLET, EXTENDED RELEASE ORAL at 20:57

## 2025-02-24 RX ADMIN — APIXABAN 5 MG: 5 TABLET, FILM COATED ORAL at 20:57

## 2025-02-24 NOTE — CASE MANAGEMENT/SOCIAL WORK
Continued Stay Note  [facility]     Patient Name: Madelene Naegele  MRN: [id number]  Today's Date: 2/24/2025    Admit Date: 2/15/2025    Plan: Plan skilled care at accepting facility- pre cert needed.  BRY Parker RN   Discharge Plan       Row Name 02/24/25 1425       Plan    Plan Plan skilled care at accepting facility- pre cert needed.  BRY Parker RN    Patient/Family in Agreement with Plan yes    Plan Comments Per Lacy  [phone]) pt will have a bed at [facility] on 2/26 and can be accepted if pre cert obtained.  Pt does not have updated PT notes since 2/22.  Updated PT notes requested.  CCP will submit for pre cert once PT notes obtained.  Plan skilled care at accepting facility- pre cert needed. BRY Parker RN                   Discharge Codes    No documentation.                 Expected Discharge Date and Time       Expected Discharge Date Expected Discharge Time    Feb 25, 2025               Rachel Parker RN

## 2025-02-24 NOTE — PLAN OF CARE
Goal Outcome Evaluation:  Plan of Care Reviewed With: patient        Progress: improving  Outcome Evaluation: Patient  resting  in  bed.  No  complaints  of  pain  voiced.  Up  to  bedside  commode  with  one  assist.  Room  air.  Afib  on  the  monitor.  Nursing  will  continue  to monitor

## 2025-02-24 NOTE — PROGRESS NOTES
Name: Madelene Naegele ADMIT: 2/15/2025   : 1940  PCP: Aav Sebastian MD    MRN: [id number] LOS: 8 days   AGE/SEX: 84 y.o. female  ROOM: [facility]     Subjective   Subjective   Examined sitting up in chair today. Appears to be resting comfortably     Objective   Objective   Vital Signs  Temp:  [97.9 °F (36.6 °C)-98.2 °F (36.8 °C)] 97.9 °F (36.6 °C)  Heart Rate:  [] 101  Resp:  [18] 18  BP: ()/(61-81) 101/71  SpO2:  [97 %-98 %] 97 %  on   ;   Device (Oxygen Therapy): room air  Body mass index is 29.84 kg/m².    Physical Exam  Constitutional:       General: She is not in acute distress.     Appearance: She is not toxic-appearing.   Eyes:      Extraocular Movements: Extraocular movements intact.      Pupils: Pupils are equal, round, and reactive to light.   Cardiovascular:      Rate and Rhythm: Normal rate. Rhythm irregular.   Pulmonary:      Effort: Pulmonary effort is normal. No respiratory distress.   Abdominal:      General: Bowel sounds are normal.      Palpations: Abdomen is soft.      Tenderness: There is no abdominal tenderness.   Musculoskeletal:         General: No swelling.   Skin:     General: Skin is warm and dry.   Neurological:      Mental Status: She is alert. Mental status is at baseline.      Cranial Nerves: No facial asymmetry.     Results Review  I reviewed the patient's new clinical results.  Results from last 7 days   Lab Units 25  1316 25  0706 25  0443 25  0600   WBC 10*3/mm3 6.80 8.06 8.02 9.12   HEMOGLOBIN g/dL 15.4 15.4 13.6 13.9   PLATELETS 10*3/mm3 220 159 147 145     Results from last 7 days   Lab Units 25  1316 25  0706 25  0442 25  0600   SODIUM mmol/L 141 140 138 137   POTASSIUM mmol/L 4.0 4.6 5.4* 4.9   CHLORIDE mmol/L 102 102 107 105   CO2 mmol/L 28.0 28.1 24.0 24.0   BUN mg/dL 25* 25* 21 15   CREATININE mg/dL 1.14* 1.29* 1.21* 1.07*   GLUCOSE mg/dL 180* 145* 104* 156*   EGFR mL/min/1.73 47.9* 41.3* 44.6* 51.6*      Results from last 7 days   Lab Units 02/24/25  1316 02/20/25  0706 02/19/25  0442 02/18/25  0600   CALCIUM mg/dL 8.8 9.3 8.8 8.9   MAGNESIUM mg/dL  --  2.0 2.2 2.4   PHOSPHORUS mg/dL  --  4.3 3.3 3.4           Glucose   Date/Time Value Ref Range Status   02/24/2025 1028 269 (H) 70 - 130 mg/dL Final   02/24/2025 0612 112 70 - 130 mg/dL Final   02/23/2025 2026 130 70 - 130 mg/dL Final   02/23/2025 1534 176 (H) 70 - 130 mg/dL Final   02/23/2025 1033 232 (H) 70 - 130 mg/dL Final   02/23/2025 0544 140 (H) 70 - 130 mg/dL Final   02/22/2025 2018 207 (H) 70 - 130 mg/dL Final       No radiology results for the last day    Scheduled Meds  apixaban, 5 mg, Oral, BID  aspirin, 81 mg, Oral, Daily  atorvastatin, 40 mg, Oral, Daily  cholecalciferol, 2,000 Units, Oral, Daily  dilTIAZem CD, 240 mg, Oral, Q24H  divalproex, 125 mg, Oral, BID  fluorometholone, 1 drop, Right Eye, TID  gabapentin, 100 mg, Oral, BID  insulin lispro, 2-7 Units, Subcutaneous, 4x Daily AC & at Bedtime  metoprolol succinate XL, 100 mg, Oral, Q12H  sertraline, 25 mg, Oral, Daily    Continuous Infusions     PRN Meds  •  acetaminophen **OR** [DISCONTINUED] acetaminophen **OR** [DISCONTINUED] acetaminophen  •  senna-docusate sodium **AND** polyethylene glycol **AND** bisacodyl **AND** bisacodyl  •  dextrose  •  dextrose  •  glucagon (human recombinant)  •  hydrOXYzine  •  melatonin  •  ondansetron ODT **OR** ondansetron       Diet  Diet: Cardiac; Healthy Heart (2-3 Na+); Fluid Consistency: Thin (IDDSI 0)       Assessment/Plan     Active Hospital Problems    Diagnosis  POA   • **Acute encephalopathy [G93.40]  Yes   • CKD (chronic kidney disease) stage 3, GFR 30-59 ml/min [N18.30]  Unknown   • Altered mental status [R41.82]  Yes   • Atrial fibrillation, persistent [I48.19]  Yes   • CAD (coronary artery disease) [I25.10]  Yes   • History of stroke [Z86.73]  Not Applicable   • HTN (hypertension) [I10]  Yes   • Hyperlipidemia [E78.5]  Yes   • Type 2 diabetes  mellitus, without long-term current use of insulin [E11.9]  Yes      Resolved Hospital Problems   No resolved problems to display.       84 y.o. female with Acute encephalopathy.    Fall  Altered mental status  History of stroke 2022 with aphasia  CT age-indeterminate infarct left frontal, chronic infarcts  MRI no acute stroke   B12 and folate okay  She seems to be baseline  PT recommends SNF    NO evidence of FOUZIA  CKD 3a  FOUZIA ruled out, creatinine near baseline    Atrial fibrillation  Back on home Cardizem and Toprol.  Continue Eliquis    Diabetes mellitus type 2  Metformin on hold.   Continue correctional insulin  A1c 7.8% in May 2024    Hypertension   Restarting meds as BP tolerates      Eliquis for  DVT prophylaxis.  Full code.  Discussed with patient and care team on multidisciplinary rounds.  Anticipate discharge to SNU facility once arrangements have been made.        Dave Silva MD  [facility]  02/24/25  15:37 EST

## 2025-02-25 LAB
GLUCOSE BLDC GLUCOMTR-MCNC: 138 MG/DL (ref 70–130)
GLUCOSE BLDC GLUCOMTR-MCNC: 216 MG/DL (ref 70–130)
GLUCOSE BLDC GLUCOMTR-MCNC: 226 MG/DL (ref 70–130)
GLUCOSE BLDC GLUCOMTR-MCNC: 90 MG/DL (ref 70–130)

## 2025-02-25 PROCEDURE — 63710000001 INSULIN LISPRO (HUMAN) PER 5 UNITS: Performed by: INTERNAL MEDICINE

## 2025-02-25 PROCEDURE — 97110 THERAPEUTIC EXERCISES: CPT

## 2025-02-25 PROCEDURE — 97530 THERAPEUTIC ACTIVITIES: CPT

## 2025-02-25 PROCEDURE — 82948 REAGENT STRIP/BLOOD GLUCOSE: CPT

## 2025-02-25 RX ADMIN — ASPIRIN 81 MG: 81 TABLET, COATED ORAL at 09:44

## 2025-02-25 RX ADMIN — METOPROLOL SUCCINATE 100 MG: 100 TABLET, EXTENDED RELEASE ORAL at 09:44

## 2025-02-25 RX ADMIN — FLUOROMETHOLONE 1 DROP: 1 SUSPENSION/ DROPS OPHTHALMIC at 21:54

## 2025-02-25 RX ADMIN — INSULIN LISPRO 3 UNITS: 100 INJECTION, SOLUTION INTRAVENOUS; SUBCUTANEOUS at 12:56

## 2025-02-25 RX ADMIN — DIVALPROEX SODIUM 125 MG: 125 TABLET, DELAYED RELEASE ORAL at 09:45

## 2025-02-25 RX ADMIN — DIVALPROEX SODIUM 125 MG: 125 TABLET, DELAYED RELEASE ORAL at 21:52

## 2025-02-25 RX ADMIN — APIXABAN 5 MG: 5 TABLET, FILM COATED ORAL at 21:53

## 2025-02-25 RX ADMIN — GABAPENTIN 100 MG: 100 CAPSULE ORAL at 09:44

## 2025-02-25 RX ADMIN — APIXABAN 5 MG: 5 TABLET, FILM COATED ORAL at 09:44

## 2025-02-25 RX ADMIN — INSULIN LISPRO 3 UNITS: 100 INJECTION, SOLUTION INTRAVENOUS; SUBCUTANEOUS at 17:38

## 2025-02-25 RX ADMIN — METOPROLOL SUCCINATE 100 MG: 100 TABLET, EXTENDED RELEASE ORAL at 21:52

## 2025-02-25 RX ADMIN — GABAPENTIN 100 MG: 100 CAPSULE ORAL at 21:52

## 2025-02-25 RX ADMIN — DILTIAZEM HYDROCHLORIDE 240 MG: 120 CAPSULE, EXTENDED RELEASE ORAL at 09:44

## 2025-02-25 RX ADMIN — FLUOROMETHOLONE 1 DROP: 1 SUSPENSION/ DROPS OPHTHALMIC at 17:38

## 2025-02-25 RX ADMIN — SERTRALINE HYDROCHLORIDE 25 MG: 50 TABLET, FILM COATED ORAL at 09:44

## 2025-02-25 RX ADMIN — Medication 2000 UNITS: at 09:44

## 2025-02-25 RX ADMIN — FLUOROMETHOLONE 1 DROP: 1 SUSPENSION/ DROPS OPHTHALMIC at 09:45

## 2025-02-25 RX ADMIN — ATORVASTATIN CALCIUM 40 MG: 20 TABLET, FILM COATED ORAL at 09:44

## 2025-02-25 NOTE — PLAN OF CARE
Goal Outcome Evaluation:     Patient alert, oriented, but still with some intermittent confusion. She is pleasant, able to make her needs known and compliant with nursing care. VSS, A-fib on the monitor with history of A-fib.

## 2025-02-25 NOTE — PROGRESS NOTES
Name: Madelene Naegele ADMIT: 2/15/2025   : 1940  PCP: Ava Sebastian MD    MRN: [id number] LOS: 9 days   AGE/SEX: 84 y.o. female  ROOM: [facility]     Subjective   Subjective     No new complaints. Sitting up in chair today       Objective   Objective   Vital Signs  Temp:  [97.4 °F (36.3 °C)-98.2 °F (36.8 °C)] 97.9 °F (36.6 °C)  Heart Rate:  [77-95] 77  Resp:  [18] 18  BP: (103-128)/(69-97) 103/69  SpO2:  [94 %-99 %] 99 %  on   ;   Device (Oxygen Therapy): room air  Body mass index is 28.31 kg/m².    Physical Exam  Constitutional:       General: She is not in acute distress.     Appearance: She is not toxic-appearing.   Eyes:      Extraocular Movements: Extraocular movements intact.      Pupils: Pupils are equal, round, and reactive to light.   Cardiovascular:      Rate and Rhythm: Normal rate. Rhythm irregular.   Pulmonary:      Effort: Pulmonary effort is normal. No respiratory distress.   Abdominal:      General: Bowel sounds are normal.      Palpations: Abdomen is soft.      Tenderness: There is no abdominal tenderness.   Musculoskeletal:         General: No swelling.   Skin:     General: Skin is warm and dry.   Neurological:      Mental Status: She is alert. Mental status is at baseline.      Cranial Nerves: No facial asymmetry.     Results Review  I reviewed the patient's new clinical results.  Results from last 7 days   Lab Units 25  1316 25  0706 25  0443   WBC 10*3/mm3 6.80 8.06 8.02   HEMOGLOBIN g/dL 15.4 15.4 13.6   PLATELETS 10*3/mm3 220 159 147     Results from last 7 days   Lab Units 25  1316 25  0706 25  0442   SODIUM mmol/L 141 140 138   POTASSIUM mmol/L 4.0 4.6 5.4*   CHLORIDE mmol/L 102 102 107   CO2 mmol/L 28.0 28.1 24.0   BUN mg/dL 25* 25* 21   CREATININE mg/dL 1.14* 1.29* 1.21*   GLUCOSE mg/dL 180* 145* 104*   EGFR mL/min/1.73 47.9* 41.3* 44.6*     Results from last 7 days   Lab Units 25  1316 25  0706 25  0442   CALCIUM mg/dL 8.8  9.3 8.8   MAGNESIUM mg/dL  --  2.0 2.2   PHOSPHORUS mg/dL  --  4.3 3.3           Glucose   Date/Time Value Ref Range Status   02/25/2025 1058 216 (H) 70 - 130 mg/dL Final   02/25/2025 0620 138 (H) 70 - 130 mg/dL Final   02/24/2025 2050 144 (H) 70 - 130 mg/dL Final   02/24/2025 1528 199 (H) 70 - 130 mg/dL Final   02/24/2025 1028 269 (H) 70 - 130 mg/dL Final   02/24/2025 0612 112 70 - 130 mg/dL Final   02/23/2025 2026 130 70 - 130 mg/dL Final       No radiology results for the last day    Scheduled Meds  apixaban, 5 mg, Oral, BID  aspirin, 81 mg, Oral, Daily  atorvastatin, 40 mg, Oral, Daily  cholecalciferol, 2,000 Units, Oral, Daily  dilTIAZem CD, 240 mg, Oral, Q24H  divalproex, 125 mg, Oral, BID  fluorometholone, 1 drop, Right Eye, TID  gabapentin, 100 mg, Oral, BID  insulin lispro, 2-7 Units, Subcutaneous, 4x Daily AC & at Bedtime  metoprolol succinate XL, 100 mg, Oral, Q12H  sertraline, 25 mg, Oral, Daily    Continuous Infusions     PRN Meds  •  acetaminophen **OR** [DISCONTINUED] acetaminophen **OR** [DISCONTINUED] acetaminophen  •  senna-docusate sodium **AND** polyethylene glycol **AND** bisacodyl **AND** bisacodyl  •  dextrose  •  dextrose  •  glucagon (human recombinant)  •  hydrOXYzine  •  melatonin  •  ondansetron ODT **OR** ondansetron       Diet  Diet: Cardiac; Healthy Heart (2-3 Na+); Fluid Consistency: Thin (IDDSI 0)       Assessment/Plan     Active Hospital Problems    Diagnosis  POA   • **Acute encephalopathy [G93.40]  Yes   • CKD (chronic kidney disease) stage 3, GFR 30-59 ml/min [N18.30]  Unknown   • Altered mental status [R41.82]  Yes   • Atrial fibrillation, persistent [I48.19]  Yes   • CAD (coronary artery disease) [I25.10]  Yes   • History of stroke [Z86.73]  Not Applicable   • HTN (hypertension) [I10]  Yes   • Hyperlipidemia [E78.5]  Yes   • Type 2 diabetes mellitus, without long-term current use of insulin [E11.9]  Yes      Resolved Hospital Problems   No resolved problems to display.       84  y.o. female with Acute encephalopathy.    Fall  Altered mental status  History of stroke 2022 with aphasia  CT age-indeterminate infarct left frontal, chronic infarcts  MRI no acute stroke   B12 and folate okay  She seems to be baseline  PT recommends SNF    NO evidence of FOUZIA  CKD 3a  FOUZIA ruled out, creatinine near baseline    Atrial fibrillation  Back on home Cardizem and Toprol.  Continue Eliquis    Diabetes mellitus type 2  Metformin on hold.   Continue correctional insulin  A1c 7.8% in May 2024    Hypertension   Restarting meds as BP tolerates      Eliquis for  DVT prophylaxis.  Full code.  Discussed with patient and care team on multidisciplinary rounds.  Anticipate discharge to SNU facility tomorrow if precert is obtained         Dave Silva MD  [facility]  02/25/25  12:47 EST

## 2025-02-25 NOTE — PLAN OF CARE
Goal Outcome Evaluation:  Plan of Care Reviewed With: patient        Progress: improving  Outcome Evaluation: Pt seen for PT tx this AM and tolerated the session well. Today, pt was SBA for bed mobility with increased time and effort, CGA for STS from EOB to RW, Evelyne for STS from recliner to RW, and CG/Evelyne for ambulation of 20' and 35' with a seated rest break between bouts. Pt performed LE ther-ex for 15 reps prior to mobility, required set-upA to don her slip on shoes and required Evelyne to apply her hearing aids. No dizziness or buckling, but was mildly unsteady with turns and appeared mildly SOB following the second gait bout. Pt will continue to benefit from skilled PT services to address her generalized weakness, impaired balance and impaired endurance as pt unable to ambulate further than 35'. Discussed w/ RN. PT w/ cont to follow.    Anticipated Discharge Disposition (PT): skilled nursing facility

## 2025-02-25 NOTE — PROGRESS NOTES
"Nutrition Services    Patient Name:  Madelene Naegele  YOB: 1940  MRN: [id number]  Admit Date:  2/15/2025  Assessment Date:  02/25/25    NUTRITION SCREENING      Reason for Encounter Length of Stay   Diagnosis/Problem S/p fall, acute encephalopathy, CKD, afib, CAD, HTN, DM       PO Diet Diet: Cardiac; Healthy Heart (2-3 Na+); Fluid Consistency: Thin (IDDSI 0)   Supplements n/a   PO Intake % %       Medications MAR reviewed by RD   Labs  Listed below, reviewed   Physical Findings alert   GI Function BM 2/19 has prn meds available   Skin Status bruising       Height  Weight  BMI  Weight Trend     Height: 157.5 cm (62\")  Weight: 70.2 kg (154 lb 12.8 oz) (02/25/25 0537)  Body mass index is 28.31 kg/m².  Stable       Nutrition Problem (PES) Nutrition appropriate for condition at this time as evidence by tolerating diet with adequate po intake.       Intervention/Plan Visited pt who states appetite and meals are just fine.  She has no request.    RD to follow up per protocol.     Results from last 7 days   Lab Units 02/24/25  1316 02/20/25  0706 02/19/25  0442   SODIUM mmol/L 141 140 138   POTASSIUM mmol/L 4.0 4.6 5.4*   CHLORIDE mmol/L 102 102 107   CO2 mmol/L 28.0 28.1 24.0   BUN mg/dL 25* 25* 21   CREATININE mg/dL 1.14* 1.29* 1.21*   CALCIUM mg/dL 8.8 9.3 8.8   GLUCOSE mg/dL 180* 145* 104*     Results from last 7 days   Lab Units 02/24/25  1316 02/20/25  0706 02/19/25  0443 02/19/25  0442   MAGNESIUM mg/dL  --  2.0  --  2.2   PHOSPHORUS mg/dL  --  4.3  --  3.3   HEMOGLOBIN g/dL 15.4 15.4   < >  --    HEMATOCRIT % 44.3 42.9   < >  --     < > = values in this interval not displayed.     Lab Results   Component Value Date    HGBA1C 7.8 (H) 05/03/2024         Electronically signed by:  Caitlin Portillo RD  02/25/25 15:20 EST  " Diamond presents for evaluation of a mass in the right side of her neck. She is sent today for consultation by Dr. Thien Fan. She is otherwise in good health but is here because of this mass. She is otherwise in good health and has no complaints. She denies any fever chills or infectious symptoms. She has not been experiencing any night sweats. This was first noted approximately 2 years ago. She does not have any pain in this region or other symptoms. She denies any dysphagia or odynophagia.    I have reviewed the patient's medications and allergies, past medical, surgical, social and family history, updating these as appropriate.  See Histories section of the EMR for a display of this information.   The patient has no ear pain and all other ROS are negative.   Complete 10 point review of systems was performed and is negative.    Physical Examination:  Diamond is a 70 year old female in no acute distress.  Skin color and turgor are normal with no obvious lesions.  The patient is well-developed and appears well-nourished.  The patient is oriented x3 without obvious neurological abnormality.  Cranial nerves II through XII are intact without deficit.  External ears are normal without lesion.  Ear canals have no wax, lesions or debris.  Tympanic membranes are well-developed without sign of fluid or infection.  Oral cavity shows no lesions, tumors or growths.  Oropharynx shows no lesions, gross tumors or exudate.  Neck is supple. She does have a large multilobulated mass in the right lower neck region in her lateral supraclavicular fossa. This measures approximately 3 cm in diameter. It is not painful to palpation.  Thyroid gland is normal without mass  Salivary glands are nontender with no palpable mass, lesions or growth.  Facial exam shows no weakness or tenderness.   Anterior nose is patent with no lesion or discharge.   No polyps, growths or tumors are noted.  Nasal septal examination reveals deflection without  obstruction.    Assessment:  Atypical right lateral supraclavicular fossa mass    Plan:  MRI scan with gadolinium  Return appointment to discuss findings and plan surgical intervention.

## 2025-02-25 NOTE — CASE MANAGEMENT/SOCIAL WORK
Post-Acute Authorization Submission      Post Acute Pre-Cert Documentation  Request Submitted by Facility - Type:: Hospital  Post-Acute Authorization Type Submitted:: SNF  Date Post Acute Pre-Cert Inititated per Facility: 02/25/25  Date Post Acute Pre-Cert Completed: 02/25/25  Accepting Facility: [facility] Discharge Date Requested: 02/26/25  All Clinicals Submitted?: Yes  Had Accepting Facility at Time of Submission: Yes  Response Received from Insurance?: Approval  Response Communicated to:: , Accepting Facility Liaison, Accepting Facility Auth Department  Authorization Number:: [id number]  Post Acute Pre-Cert Initiated Comment: VALID TO ADMIT [facility] 3/1/25.              Oskar Ruby, PCT

## 2025-02-25 NOTE — CASE MANAGEMENT/SOCIAL WORK
Continued Stay Note  [facility]     Patient Name: Madelene Naegele  MRN: [id number]  Today's Date: 2/25/2025    Admit Date: 2/15/2025    Plan: Plan [facility] for skilled care- pre cert needed.  BRY Parker RN   Discharge Plan       Row Name 02/25/25 1026       Plan    Plan Plan [facility] for skilled care- pre cert needed.  BRY Parker RN    Patient/Family in Agreement with Plan yes    Plan Comments Spoke with pt at bedside. Per Lacy  ( [phone]) pt will have a bed at [facility] on 2/26 and can be accepted for skilled care. PT notes updated.  Information sent to [facility] to initiate pre cert.   Plan [facility] for skilled care- pre cert needed. BRY Parker RN                   Discharge Codes    No documentation.                 Expected Discharge Date and Time       Expected Discharge Date Expected Discharge Time    Feb 25, 2025               Rachel Parker RN

## 2025-02-25 NOTE — CASE MANAGEMENT/SOCIAL WORK
Continued Stay Note  [facility]     Patient Name: Madelene Naegele  MRN: [id number]  Today's Date: 2/25/2025    Admit Date: 2/15/2025    Plan: Plan [facility] for skilled care- pre cert obtained.  BRY Parker RN   Discharge Plan       Row Name 02/25/25 1603       Plan    Plan Plan [facility] for skilled care- pre cert obtained.  BRY Parker RN    Patient/Family in Agreement with Plan yes    Plan Comments Per [facility] pt has pre cert.  Per Lacy ( [phone]) pt has a bed and can be accepted at [facility] on 2/26.   Plan [facility] for skilled care- pre cert obtained.  BRY Parker RN                   Discharge Codes    No documentation.                 Expected Discharge Date and Time       Expected Discharge Date Expected Discharge Time    Feb 25, 2025               Rachel Parker RN

## 2025-02-25 NOTE — THERAPY TREATMENT NOTE
Patient Name: Madelene Naegele  : 1940    MRN: [id number]                              Today's Date: 2025       Admit Date: 2/15/2025    Visit Dx:     ICD-10-CM ICD-9-CM   1. Acute encephalopathy  G93.40 348.30   2. Acute kidney injury  N17.9 584.9   3. Abnormal head CT  R93.0 793.0   4. Fall, initial encounter  W19.XXXA E888.9   5. Chronic anticoagulation  Z79.01 V58.61   6. Laceration of right hand without foreign body, initial encounter  S61.411A 882.0     Patient Active Problem List   Diagnosis    Cellulitis of left leg    Severe sepsis    HTN (hypertension)    Atrial fibrillation, persistent    Hyperlipidemia    History of stroke    CAD (coronary artery disease)    Type 2 diabetes mellitus, without long-term current use of insulin    Acute encephalopathy    Altered mental status    CKD (chronic kidney disease) stage 3, GFR 30-59 ml/min     Past Medical History:   Diagnosis Date    CAD (coronary artery disease)     History of stroke     Hyperlipidemia     Hypertension     Persistent atrial fibrillation     Type 2 diabetes mellitus      History reviewed. No pertinent surgical history.   General Information       Row Name 25 0909          Physical Therapy Time and Intention    Document Type progress note/recertification  -MG     Mode of Treatment individual therapy;physical therapy  -MG       Row Name 25 0909          General Information    Patient Profile Reviewed yes  -MG     Existing Precautions/Restrictions fall  -MG     Barriers to Rehab medically complex;previous functional deficit;hearing deficit  [place]. Hearing aids in room.  -MG       Row Name 25 0909          Cognition    Orientation Status (Cognition) oriented x 3  -MG       Row Name 25 0909          Safety Issues/Impairments Affecting Functional Mobility    Safety Issues Affecting Function (Mobility) problem-solving;sequencing abilities;insight into deficits/self-awareness  -MG     Impairments Affecting Function  (Mobility) endurance/activity tolerance;strength;balance;shortness of breath  -MG     Comment, Safety Issues/Impairments (Mobility) Gait belt and non-skid socks donned.  -MG               User Key  (r) = Recorded By, (t) = Taken By, (c) = Cosigned By      Initials Name Provider Type    MG Hansa Cha, PT Physical Therapist                   Mobility       Row Name 02/25/25 0910          Bed Mobility    Bed Mobility supine-sit  -MG     Supine-Sit [place] (Bed Mobility) standby assist  -MG     Assistive Device (Bed Mobility) bed rails;head of bed elevated  -MG     Comment, (Bed Mobility) Increased time and effort to complete.  -MG       Row Name 02/25/25 0910          Sit-Stand Transfer    Sit-Stand [place] (Transfers) contact guard;minimum assist (75% patient effort);verbal cues  -MG     Assistive Device (Sit-Stand Transfers) walker, front-wheeled  -MG     Comment, (Sit-Stand Transfer) CGA for STS from EOB. Evelyne for STS from recliner. Cues for hand placement and sequencing.  -MG       Row Name 02/25/25 0910          Gait/Stairs (Locomotion)    [place] Level (Gait) contact guard;minimum assist (75% patient effort);verbal cues  -MG     Assistive Device (Gait) walker, front-wheeled  -MG     Distance in Feet (Gait) 20  + 35'  -MG     Deviations/Abnormal Patterns (Gait) gait speed decreased;stride length decreased;base of support, narrow  -MG     Bilateral Gait Deviations heel strike decreased;forward flexed posture  -MG     Comment, (Gait/Stairs) Pt amb 20' in room w/ RW, had a seated rest break, then ambulated additional 35' w/ encouragement to ambulate into hallway. Cues for posture and to stay within RW, especially during turns. Mild unsteadiness with turns, but no overt LOB. Mild SOB following 35' gait bout. No buckling or dizziness.  -MG               User Key  (r) = Recorded By, (t) = Taken By, (c) = Cosigned By      Initials Name Provider Type    Hansa Bernal, PT Physical Therapist                    Obj/Interventions       Row Name 02/25/25 0912          Motor Skills    Therapeutic Exercise other (see comments)  AP, LAQ, HF x15  -MG       Row Name 02/25/25 0912          Balance    Comment, Balance Sitting EOB with SBA/SV. Dons slip-on shoes w/ set-up assist and hearing aids with Evelyne. Standing w/ RW and CG/Evelyne, mild unsteadiness w/ turns.  -MG               User Key  (r) = Recorded By, (t) = Taken By, (c) = Cosigned By      Initials Name Provider Type    MG Hansa Cha, PT Physical Therapist                   Goals/Plan       Row Name 02/25/25 0917          Bed Mobility Goal 1 (PT)    Progress/Outcomes (Bed Mobility Goal 1, PT) goal met  -MG       Row Name 02/25/25 0917          Transfer Goal 1 (PT)    Progress/Outcome (Transfer Goal 1, PT) goal ongoing;good progress toward goal  -MG       Row Name 02/25/25 0917          Gait Training Goal 1 (PT)    Progress/Outcome (Gait Training Goal 1, PT) goal ongoing;good progress toward goal  -MG               User Key  (r) = Recorded By, (t) = Taken By, (c) = Cosigned By      Initials Name Provider Type    MG Hansa Cha, PT Physical Therapist                   Clinical Impression       Row Name 02/25/25 0913          Pain    Pretreatment Pain Rating 0/10 - no pain  -MG     Posttreatment Pain Rating 0/10 - no pain  -MG       Row Name 02/25/25 0913          Plan of Care Review    Plan of Care Reviewed With patient  -MG     Progress improving  -MG     Outcome Evaluation Pt seen for PT tx this AM and tolerated the session well. Today, pt was SBA for bed mobility with increased time and effort, CGA for STS from EOB to RW, Evelyne for STS from recliner to RW, and CG/Evelyne for ambulation of 20' and 35' with a seated rest break between bouts. Pt performed LE ther-ex for 15 reps prior to mobility, required set-upA to don her slip on shoes and required Evelyne to apply her hearing aids. No dizziness or buckling, but was mildly unsteady with turns and appeared mildly SOB  following the second gait bout. Pt will continue to benefit from skilled PT services to address her generalized weakness, impaired balance and impaired endurance as pt unable to ambulate further than 35'. Discussed w/ RN. PT w/ cont to follow.  -MG       Row Name 02/25/25 0913          Therapy Assessment/Plan (PT)    Rehab Potential (PT) fair  -MG     Criteria for Skilled Interventions Met (PT) yes  -MG     Therapy Frequency (PT) 5 times/wk  -MG       Row Name 02/25/25 0913          Positioning and Restraints    Pre-Treatment Position in bed  -MG     Post Treatment Position chair  -MG     In Chair notified nsg;sitting;call light within reach;encouraged to call for assist;exit alarm on  Tray table in front  -MG               User Key  (r) = Recorded By, (t) = Taken By, (c) = Cosigned By      Initials Name Provider Type    Hansa Bernal, JESSI Physical Therapist                   Outcome Measures       Row Name 02/25/25 0917 02/25/25 0000       How much help from another person do you currently need...    Turning from your back to your side while in flat bed without using bedrails? 4  -MG 4  -MS    Moving from lying on back to sitting on the side of a flat bed without bedrails? 3  -MG 4  -MS    Moving to and from a bed to a chair (including a wheelchair)? 3  -MG 3  -MS    Standing up from a chair using your arms (e.g., wheelchair, bedside chair)? 3  -MG 3  -MS    Climbing 3-5 steps with a railing? 2  -MG 2  -MS    To walk in hospital room? 3  -MG 3  -MS    AM-PAC 6 Clicks Score (PT) 18  -MG 19  -MS    Highest Level of Mobility Goal 6 --> Walk 10 steps or more  -MG 6 --> Walk 10 steps or more  -MS              User Key  (r) = Recorded By, (t) = Taken By, (c) = Cosigned By      Initials Name Provider Type    Hansa Bernal, JESSI Physical Therapist    Mehnaz Gayle, RN Registered Nurse                                 Physical Therapy Education       Title: PT OT SLP Therapies (Done)       Topic: Physical Therapy  (Done)       Point: Mobility training (Done)       Learning Progress Summary            Patient Acceptance, E,D, VU,NR by MG at 2/25/2025 0918    Acceptance, E,TB, VU by PB at 2/21/2025 1614    Acceptance, E,TB, VU by PB at 2/20/2025 1448    Eager, E, VU,NR by SV at 2/20/2025 1238    Acceptance, E,TB,D, DU,NR by PH at 2/19/2025 1109                      Point: Home exercise program (Done)       Learning Progress Summary            Patient Acceptance, E,TB, VU by PB at 2/21/2025 1614    Acceptance, E,TB, VU by PB at 2/20/2025 1448    Eager, E, VU,NR by SV at 2/20/2025 1238    Acceptance, E,TB,D, DU,NR by PH at 2/19/2025 1109                      Point: Body mechanics (Done)       Learning Progress Summary            Patient Acceptance, E,D, VU,NR by MG at 2/25/2025 0918    Acceptance, E,TB, VU by PB at 2/21/2025 1614    Acceptance, E,TB, VU by PB at 2/20/2025 1448    Eager, E, VU,NR by SV at 2/20/2025 1238    Acceptance, E,TB,D, DU,NR by PH at 2/19/2025 1109                      Point: Precautions (Done)       Learning Progress Summary            Patient Acceptance, E,D, VU,NR by MG at 2/25/2025 0918    Acceptance, E,TB, VU by PB at 2/21/2025 1614    Acceptance, E,TB, VU by PB at 2/20/2025 1448    Eager, E, VU,NR by SV at 2/20/2025 1238    Acceptance, E,TB,D, DU,NR by PH at 2/19/2025 1109                                      User Key       Initials Effective Dates Name Provider Type Discipline    SV 07/11/23 -  Lizette Walsh, PT Physical Therapist PT    MG 05/24/22 -  Hansa Cha, PT Physical Therapist PT    PB 02/09/24 -  Nevaeh Haq, RN Registered Nurse Nurse    PH 06/16/21 -  Annie Mckee PTA Physical Therapist Assistant PT                  PT Recommendation and Plan     Progress: improving  Outcome Evaluation: Pt seen for PT tx this AM and tolerated the session well. Today, pt was SBA for bed mobility with increased time and effort, CGA for STS from EOB to RW, Evelyne for STS from recliner to RW,  and CG/Evelyne for ambulation of 20' and 35' with a seated rest break between bouts. Pt performed LE ther-ex for 15 reps prior to mobility, required set-upA to don her slip on shoes and required Evelyne to apply her hearing aids. No dizziness or buckling, but was mildly unsteady with turns and appeared mildly SOB following the second gait bout. Pt will continue to benefit from skilled PT services to address her generalized weakness, impaired balance and impaired endurance. Discussed w/ RN. PT w/ cont to follow.     Time Calculation:         PT Charges       Row Name 02/25/25 0918             Time Calculation    Start Time 0843  -MG      Stop Time 0907  -MG      Time Calculation (min) 24 min  -MG      PT Received On 02/25/25  -MG      PT - Next Appointment 02/26/25  -MG      PT Goal Re-Cert Due Date 03/11/25  -MG                User Key  (r) = Recorded By, (t) = Taken By, (c) = Cosigned By      Initials Name Provider Type    MG Hansa Cha, PT Physical Therapist                  Therapy Charges for Today       Code Description Service Date Service Provider Modifiers Qty    04443635495 HC PT THERAPEUTIC ACT EA 15 MIN 2/25/2025 Hansa Cha, PT GP 1    19997912465 HC PT THER PROC EA 15 MIN 2/25/2025 Hansa Cha, PT GP 1            PT G-Codes  Outcome Measure Options: AM-PAC 6 Clicks Basic Mobility (PT)  AM-PAC 6 Clicks Score (PT): 18  AM-PAC 6 Clicks Score (OT): 20  PT Discharge Summary  Anticipated Discharge Disposition (PT): skilled nursing facility    Hansa Cha PT  2/25/2025

## 2025-02-26 VITALS
SYSTOLIC BLOOD PRESSURE: 119 MMHG | DIASTOLIC BLOOD PRESSURE: 69 MMHG | OXYGEN SATURATION: 97 % | HEIGHT: 62 IN | HEART RATE: 89 BPM | WEIGHT: 153.44 LBS | BODY MASS INDEX: 28.24 KG/M2 | TEMPERATURE: 97.3 F | RESPIRATION RATE: 14 BRPM

## 2025-02-26 LAB
GLUCOSE BLDC GLUCOMTR-MCNC: 132 MG/DL (ref 70–130)
GLUCOSE BLDC GLUCOMTR-MCNC: 138 MG/DL (ref 70–130)
GLUCOSE BLDC GLUCOMTR-MCNC: 236 MG/DL (ref 70–130)

## 2025-02-26 PROCEDURE — 82948 REAGENT STRIP/BLOOD GLUCOSE: CPT

## 2025-02-26 PROCEDURE — 63710000001 INSULIN LISPRO (HUMAN) PER 5 UNITS: Performed by: INTERNAL MEDICINE

## 2025-02-26 RX ORDER — FLUOROMETHOLONE 1 MG/ML
1 SUSPENSION/ DROPS OPHTHALMIC 3 TIMES DAILY
Qty: 5 ML | Refills: 0 | Status: SHIPPED | OUTPATIENT
Start: 2025-02-26

## 2025-02-26 RX ADMIN — INSULIN LISPRO 3 UNITS: 100 INJECTION, SOLUTION INTRAVENOUS; SUBCUTANEOUS at 12:15

## 2025-02-26 RX ADMIN — FLUOROMETHOLONE 1 DROP: 1 SUSPENSION/ DROPS OPHTHALMIC at 08:07

## 2025-02-26 RX ADMIN — SERTRALINE HYDROCHLORIDE 25 MG: 50 TABLET, FILM COATED ORAL at 08:07

## 2025-02-26 RX ADMIN — ATORVASTATIN CALCIUM 40 MG: 20 TABLET, FILM COATED ORAL at 08:07

## 2025-02-26 RX ADMIN — DILTIAZEM HYDROCHLORIDE 240 MG: 120 CAPSULE, EXTENDED RELEASE ORAL at 08:07

## 2025-02-26 RX ADMIN — APIXABAN 5 MG: 5 TABLET, FILM COATED ORAL at 08:07

## 2025-02-26 RX ADMIN — ASPIRIN 81 MG: 81 TABLET, COATED ORAL at 08:07

## 2025-02-26 RX ADMIN — Medication 2000 UNITS: at 08:07

## 2025-02-26 RX ADMIN — GABAPENTIN 100 MG: 100 CAPSULE ORAL at 08:07

## 2025-02-26 RX ADMIN — DIVALPROEX SODIUM 125 MG: 125 TABLET, DELAYED RELEASE ORAL at 08:07

## 2025-02-26 RX ADMIN — METOPROLOL SUCCINATE 100 MG: 100 TABLET, EXTENDED RELEASE ORAL at 08:07

## 2025-02-26 NOTE — PLAN OF CARE
Problem: Adult Inpatient Plan of Care  Goal: Plan of Care Review  Outcome: Progressing  Flowsheets (Taken 2/26/2025 1321)  Progress: improving  Outcome Evaluation: Patient has been cooperative during shift. No complaints of pain, nasuea or SOA. AOx3, assist x1, Afib, room air. Patient in chair. Working on discharge work. Will continue to monitor and assist patient as needed.  Plan of Care Reviewed With: patient  Goal: Patient-Specific Goal (Individualized)  Outcome: Progressing  Goal: Absence of Hospital-Acquired Illness or Injury  Outcome: Progressing  Intervention: Identify and Manage Fall Risk  Recent Flowsheet Documentation  Taken 2/26/2025 1200 by Tommy White RN  Safety Promotion/Fall Prevention:   assistive device/personal items within reach   fall prevention program maintained   nonskid shoes/slippers when out of bed   safety round/check completed  Taken 2/26/2025 1000 by Tommy White RN  Safety Promotion/Fall Prevention:   assistive device/personal items within reach   fall prevention program maintained   nonskid shoes/slippers when out of bed   safety round/check completed  Taken 2/26/2025 0759 by Tommy White RN  Safety Promotion/Fall Prevention:   assistive device/personal items within reach   fall prevention program maintained   nonskid shoes/slippers when out of bed   safety round/check completed  Intervention: Prevent Skin Injury  Recent Flowsheet Documentation  Taken 2/26/2025 1200 by Tommy White RN  Body Position: (chair) other (see comments)  Taken 2/26/2025 1000 by Tommy White RN  Body Position: (chair) other (see comments)  Taken 2/26/2025 0759 by Tommy White RN  Body Position: supine  Skin Protection: incontinence pads utilized  Goal: Optimal Comfort and Wellbeing  Outcome: Progressing  Goal: Readiness for Transition of Care  Outcome: Progressing     Problem: Comorbidity Management  Goal: Blood Glucose Level Within Target Range  Outcome: Progressing      Problem: Fall Injury Risk  Goal: Absence of Fall and Fall-Related Injury  Outcome: Progressing  Intervention: Promote Injury-Free Environment  Recent Flowsheet Documentation  Taken 2/26/2025 1200 by Tommy White RN  Safety Promotion/Fall Prevention:   assistive device/personal items within reach   fall prevention program maintained   nonskid shoes/slippers when out of bed   safety round/check completed  Taken 2/26/2025 1000 by Tommy White RN  Safety Promotion/Fall Prevention:   assistive device/personal items within reach   fall prevention program maintained   nonskid shoes/slippers when out of bed   safety round/check completed  Taken 2/26/2025 0759 by Tommy White RN  Safety Promotion/Fall Prevention:   assistive device/personal items within reach   fall prevention program maintained   nonskid shoes/slippers when out of bed   safety round/check completed     Problem: Dysrhythmia  Goal: Normalized Cardiac Rhythm  Outcome: Progressing   Goal Outcome Evaluation:  Plan of Care Reviewed With: patient        Progress: improving  Outcome Evaluation: Patient has been cooperative during shift. No complaints of pain, nasuea or SOA. AOx3, assist x1, Afib, room air. Patient in chair. Working on discharge work. Will continue to monitor and assist patient as needed.

## 2025-02-26 NOTE — PLAN OF CARE
Goal Outcome Evaluation:      Patient alert with some intermittent confusion.VSS.Room air.Denies pain or SOA.Medicated per MAR.Up to BR w assist.Plan to d/c to [facility] once ready.Afib, controlled rate.

## 2025-02-26 NOTE — DISCHARGE SUMMARY
Patient Name: Madelene Naegele  : 1940  MRN: [id number]    Date of Admission: 2/15/2025  Date of Discharge:  2025  Primary Care Physician: Ava Sebastian MD      Chief Complaint:   Fall and Head Injury (Fall- head injury is on eliquis -has had a stroke 2 years ago and has trouble communicating at times from that stroke )      Discharge Diagnoses     Active Hospital Problems    Diagnosis  POA    **Acute encephalopathy [G93.40]  Yes    CKD (chronic kidney disease) stage 3, GFR 30-59 ml/min [N18.30]  Unknown    Altered mental status [R41.82]  Yes    Atrial fibrillation, persistent [I48.19]  Yes    CAD (coronary artery disease) [I25.10]  Yes    History of stroke [Z86.73]  Not Applicable    HTN (hypertension) [I10]  Yes    Hyperlipidemia [E78.5]  Yes    Type 2 diabetes mellitus, without long-term current use of insulin [E11.9]  Yes      Resolved Hospital Problems   No resolved problems to display.        Hospital Course     This is an 83-year-old woman with a past medical history of coronary artery disease, paroxysmal atrial fibrillation, hypertension, hyperlipidemia, type 2 diabetes who presented to the hospital after experiencing mechanical fall..  She had a CT scan that was concerning for an age-indeterminate infarct and so she underwent an MRI was negative for acute stroke.  She initially there was concern that she had FOUZIA however this was ruled out and her creatinine was noted to be around baseline.  Regarding the atrial fibrillation, she was back on her home Cardizem metoprolol and was anticoagulated with Eliquis.  The patient is medically stable for discharge.      Physical Exam:  Temp:  [97.3 °F (36.3 °C)-98 °F (36.7 °C)] 97.3 °F (36.3 °C)  Heart Rate:  [77-89] 89  Resp:  [14-18] 14  BP: (103-128)/(69-94) 119/69  Body mass index is 28.06 kg/m².  Physical Exam  Constitutional:       General: She is not in acute distress.  HENT:      Head: Normocephalic and atraumatic.   Cardiovascular:       Rate and Rhythm: Normal rate and regular rhythm.   Pulmonary:      Effort: Pulmonary effort is normal. No respiratory distress.   Abdominal:      General: There is no distension.      Palpations: Abdomen is soft.      Tenderness: There is no abdominal tenderness.   Neurological:      Mental Status: She is alert and oriented to person, place, and time.         Consultants     Consult Orders (all) (From admission, onward)       Start     Ordered    02/15/25 2243  Inpatient Case Management  Consult  Once        Provider:  (Not yet assigned)    02/15/25 2243    02/15/25 1917  [facility] (on-call MD unless specified) Details  Once,   Status:  Canceled        Specialty:  Hospitalist  Provider:  (Not yet assigned)    02/15/25 1916                  Procedures     Imaging Results (All)       Procedure Component Value Units Date/Time    MRI Brain Without Contrast [[id number]] Collected: 02/17/25 0952     Updated: 02/18/25 1157    Narrative:      MRI OF THE BRAIN WITHOUT CONTRAST ON 02/17/2025     CLINICAL HISTORY: This is an 84-year-old female patient who had prior  head CT 2 days ago demonstrating age-indeterminate lateral left frontal  infarct and had recent fall and has prior history of stroke.     TECHNIQUE: Axial T1, FLAIR, fat-suppressed T2, axial diffusion and  gradient echo T2 and sagittal T1-weighted images were obtained of the  entire head.     This is correlated to prior head CT from [facility] 2  days ago on 02/15/2025 and an outside MRI of the brain from [facility] that was performed on 05/24/2022.     FINDINGS: There is some patchy areas of T2 hyperintensity in the  periventricular and subcortical white matter of the cerebral hemispheres  most consistent with mild-moderate small vessel disease. There is a 2 x  1.1 x 1.2 cm old inferior lateral left frontal cortical infarct in the  left MCA territory unchanged since MRI of the brain from [facility] on 05/24/2022. There is  an additional area of encephalomalacia  compatible with a chronic infarct extending from the inferior lateral  left parietal lobe into the lateral left temporal occipital region that  measures 4 x 3 x 4 cm. There is some cortical T1 hyperintensity  compatible with cortical methemoglobin deposition at the site of the  prior infarct and there is on the gradient echo T2 weighted images  signal loss in the cortex from hemosiderin deposition compatible with  old petechial hemorrhage into this old infarct and this is also in the  left MCA territory and this was an acute infarct back on outside MRI of  the brain from [facility] on 05/24/2022 and is evolved to a  chronic infarct with areas of old petechial hemorrhage within it. There  is an old lacunar infarct extending from the anterior mid right corona  radiata region into the anterior superior right putamen that measures 11  x 8 x 12 mm in size unchanged. There are multiple tiny chronic infarcts  in the cerebellum largest of which measures 11 x 6 mm in size in the  posterior medial left cerebellum. This is in the left PICA territory and  there are several additional tiny chronic infarcts including a 9 x 5 mm  chronic infarct in posterior inferior medial left cerebellum and left  PICA territory and several 3 to 5 mm chronic infarcts posterior inferior  lateral left cerebellum and left PICA territory and these are all  unchanged. There are tiny chronic infarcts measuring 3 to 5 mm in size  in the superior lateral and inferior lateral right cerebellum in the  right PICA territory also unchanged. The remainder of the brain  parenchyma is normal in signal intensity. Specifically no  diffusion-weighted abnormality is identified with no acute infarct  identified. There is mild diffuse cerebral atrophy. The ventricles are  upper limits of normal in size. I see no mass effect. No midline shift  and no extra-axial fluid collections are identified. The  paranasal  sinuses and the mastoid air cells and middle ear cavities are clear.  Good flow voids are demonstrated within the cerebral vessels and in the  dural venous sinuses. The calvarium and skull base demonstrate normal  marrow signal intensity. There are lens implants in the globes  bilaterally from previous bilateral cataract surgery. Otherwise orbits  are unremarkable.       Impression:      1. No acute intracranial abnormality is identified with no acute infarct  identified.  2. Since the prior outside MRI of the brain from [facility]  on 05/24/2024 the acute infarct extending from the inferior lateral left  parietal lobe into the superior lateral left temporal occipital region  has evolved to a chronic infarct measuring 4 x 3 x 4 cm in size and  there are areas of hemosiderin deposition methemoglobin deposition from  old petechial hemorrhage into this chronic infarct in the distribution  of the temporal parietal occipital branches of the left MCA territory.  There is also a chronic infarct in the inferior lateral left frontal  lobe that measures 2 x 1.1 x 1.2 cm also in the left MCA territory that  is unchanged since outside MRI of the brain from [facility]  on 05/24/2022. There are multiple tiny chronic infarcts in the  cerebellar hemispheres bilaterally in the PICA territories as described  above and there is mild-moderate small vessel disease in cerebral white  matter. There is an 11 x 8 x 12 mm old lacunar infarct extending from  the anterior mid right corona radiata region into the superior right  putamen. There is mild cerebral atrophy. Lens implants are in place in  the globes from previous bilateral cataract surgery.  3. The remainder of the MRI of the brain is normal. Again, specifically  no acute infarct is identified. In particular, the inferior lateral left  frontal cortical infarct is clearly a chronic infarct and unchanged  since outside MRI of the brain from [place] [facility] on  05/24/2022.        This report was finalized on 2/18/2025 11:54 AM by Dr. Presley Cornelius M.D  on Workstation: [id number]       XR Chest 1 View [[id number]] Collected: 02/17/25 1426     Updated: 02/17/25 1440    Narrative:      CHEST SINGLE VIEW     HISTORY: 84 years of age, Female.  confusion; G93.40-Encephalopathy,  unspecified; N17.9-Acute kidney failure, unspecified; R93.0-Abnormal  findings on diagnostic imaging of skull and head, not elsewhere  classified; W19.XXXA-Unspecified fall, initial encounter; Z79.01-Long  term (current) use of anticoagulants; S61.411A-Laceration without  foreign body of right hand, initial encounter     COMPARISON: AP chest 06/17/2024.     FINDINGS: Heart size is mildly enlarged and the cardiomediastinal  silhouette is not changed. The lungs appear clear of focal airspace  disease. There is no evidence for pulmonary edema or pleural effusion  infiltrate.       Impression:      Cardiomegaly. No interval change or evidence for active  disease in the chest.        This report was finalized on 2/17/2025 2:37 PM by Michael Seals M.D  on Workstation: [id number]       MRI Outside Films [[id number]] Resulted: 02/17/25 0848     Updated: 02/17/25 0848    Narrative:      This procedure was auto-finalized with no dictation required.    CT Head Without Contrast [[id number]] Collected: 02/15/25 1855     Updated: 02/15/25 1908    Narrative:      CT HEAD WITHOUT CONTRAST     CLINICAL HISTORY: Fall, altered mental status     TECHNIQUE: CT scan of the head was obtained with 3 mm axial soft tissue  and 2 mm axial bone algorithm algorithm images. No intravenous contrast  was administered. Sagittal and coronal reconstructions were obtained.     COMPARISON: None.     FINDINGS:       There is no evidence for an acute extra-axial hemorrhage or a calvarial  fracture.     The ventricles, sulci, and cisterns are generally age-appropriate. There  is an age-indeterminate infarct within the  lateral aspect of the left  frontal lobe which measures up to approximately 2.2 x 1.0 cm in greatest  axial dimensions and is within the left MCA distribution. This could  potentially be an acute to subacute infarct and further temporal  characterization with an MRI of the brain is suggested.     There is a focus of encephalomalacia within the lateral aspect of the  left temporal lobe extending into the left occipital lobe and the left  inferior parietal lobule that is compatible with a chronic infarct  within the left MCA distribution. It measures up to approximately 3.3 x  3.6 x 2.4 cm in greatest dimensions. Additionally, there are small  chronic infarcts within the left cerebellar hemisphere that are within  the left PICA distribution. These are all subcentimeter in size. An  additional subcentimeter chronic infarct is identified within the  lateral aspect of the right inferior parietal lobule and this is within  the right MCA distribution. There are mild to moderate changes of  chronic small vessel ischemic phenomenon. Old lacunar disease is seen  within the right corona radiata.       Impression:         No evidence for acute traumatic intracranial pathology.     However, there is an age-indeterminate infarct within the lateral aspect  of the left frontal lobe within the left MCA distribution. Further  temporal characterization with MR imaging is suggested.     Chronic supratentorial and infratentorial infarcts are identified as  discussed in detail above. Mild to moderate changes of chronic small  vessel ischemic phenomena and old lacunar disease are noted.     These findings and recommendations were discussed with Dr. Zelaya on  2/15/2025 at approximately 7:05 p.m.        Radiation dose reduction techniques were utilized, including automated  exposure control and exposure modulation based on body size.     This report was finalized on 2/15/2025 7:05 PM by Dr. Ricky Meneses M.D  on Workstation:  "[id number]               Pertinent Labs     Results from last 7 days   Lab Units 02/24/25  1316 02/20/25  0706   WBC 10*3/mm3 6.80 8.06   HEMOGLOBIN g/dL 15.4 15.4   PLATELETS 10*3/mm3 220 159     Results from last 7 days   Lab Units 02/24/25  1316 02/20/25  0706   SODIUM mmol/L 141 140   POTASSIUM mmol/L 4.0 4.6   CHLORIDE mmol/L 102 102   CO2 mmol/L 28.0 28.1   BUN mg/dL 25* 25*   CREATININE mg/dL 1.14* 1.29*   GLUCOSE mg/dL 180* 145*   Estimated Creatinine Clearance: 34.2 mL/min (A) (by C-G formula based on SCr of 1.14 mg/dL (H)).    Results from last 7 days   Lab Units 02/24/25  1316 02/20/25  0706   CALCIUM mg/dL 8.8 9.3   MAGNESIUM mg/dL  --  2.0   PHOSPHORUS mg/dL  --  4.3               Invalid input(s): \"LDLCALC\"        Test Results Pending at Discharge       Discharge Details        Discharge Medications        New Medications        Instructions Start Date   fluorometholone 0.1 % ophthalmic suspension  Commonly known as: FML   1 drop, Right Eye, 3 Times Daily             Continue These Medications        Instructions Start Date   apixaban 5 MG tablet tablet  Commonly known as: ELIQUIS   1 tablet, 2 Times Daily      aspirin 81 MG EC tablet   81 mg, Daily      atorvastatin 40 MG tablet  Commonly known as: LIPITOR   1 tablet, Daily      cholecalciferol 25 MCG (1000 UT) tablet  Commonly known as: VITAMIN D3   2 tablets, Daily      dilTIAZem  MG 24 hr capsule  Commonly known as: CARDIZEM CD   1 capsule, Oral, Daily      divalproex 125 MG DR tablet  Commonly known as: DEPAKOTE   1 tablet, 2 Times Daily      gabapentin 100 MG capsule  Commonly known as: NEURONTIN   1 capsule, 2 Times Daily      hydrOXYzine 25 MG tablet  Commonly known as: ATARAX   1 tablet, Every 6 Hours PRN      metFORMIN 500 MG tablet  Commonly known as: GLUCOPHAGE   1 tablet, 2 Times Daily With Meals      metoprolol succinate  MG 24 hr tablet  Commonly known as: TOPROL-XL   100 mg, 2 Times Daily      potassium chloride 20 MEQ " tablet controlled-release ER tablet  Commonly known as: K-DUR,KLOR-CON   2 tablets, Daily      sertraline 25 MG tablet  Commonly known as: ZOLOFT   1 tablet, Daily      torsemide 20 MG tablet  Commonly known as: DEMADEX   1 tablet, Daily      vitamin C 250 MG tablet  Commonly known as: ASCORBIC ACID   500 mg, Daily             Stop These Medications      amoxicillin-clavulanate 875-125 MG per tablet  Commonly known as: AUGMENTIN     fluorometholone 0.1 % ophthalmic suspension  Commonly known as: EFLONE     lisinopril 5 MG tablet  Commonly known as: PRINIVIL,ZESTRIL     Tiadylt  MG 24 hr capsule  Generic drug: dilTIAZem              No Known Allergies      Discharge Disposition:  Skilled Nursing Facility (DC - External)    Discharge Diet:  Diet Order   Procedures    Diet: Cardiac; Healthy Heart (2-3 Na+); Fluid Consistency: Thin (IDDSI 0)       Discharge Activity:       CODE STATUS:    Code Status and Medical Interventions: CPR (Attempt to Resuscitate); Full   Ordered at: 02/15/25 1929     Code Status (Patient has no pulse and is not breathing):    CPR (Attempt to Resuscitate)     Medical Interventions (Patient has pulse or is breathing):    Full       No future appointments.   Contact information for follow-up providers       Ava Sebastian MD .    Specialty: Internal Medicine  Contact information:  [address] [place] [address]  [phone]                       Contact information for after-discharge care       Destination       [facility] .    Service: Skilled Nursing  Contact information:  [address] [place] [id number] [phone]                                   Time Spent on Discharge:  Greater than 30 minutes      Regan Jensen MD  [facility]  02/26/25  11:46 EST

## 2025-02-26 NOTE — CASE MANAGEMENT/SOCIAL WORK
Continued Stay Note  [facility]     Patient Name: Madelene Naegele  MRN: [id number]  Today's Date: 2/26/2025    Admit Date: 2/15/2025    Plan: Plan [facility] for skilled care - pre cet obtained.  BRY Parker RN   Discharge Plan       Row Name 02/26/25 1151       Plan    Plan Plan [facility] for skilled care - pre cert obtained.  BRY Parker RN    Patient/Family in Agreement with Plan yes    Plan Comments Per Lacy  ( [phone]) pt has a bed and can be accpeted at [facility] today and pre cert has been obtained.   Discharge Sumamry in packet. Discharge Summary to be printed for facility per Lacy.  Prescriptions if needed sent to facility pharmacy.  Called and informed pt's POA & HCS (Jovita Ortega [phone]) of pt's discharge.  Shaila W/C Brendan arranged to transport pt with a  time of 1600.  Pt's POA will cover the $89.50 cost.  Plan [facility] for skilled care pre cert obtained.  BRY Parker RN                   Discharge Codes    No documentation.                 Expected Discharge Date and Time       Expected Discharge Date Expected Discharge Time    Feb 26, 2025               Rachel Parker RN

## 2025-02-26 NOTE — CASE MANAGEMENT/SOCIAL WORK
Case Management Discharge Note      Final Note: Pt discharged to [facility] for skilled care.  BRY Parker RN         Selected Continued Care - Admitted Since 2/15/2025       Destination Coordination complete.      Service Provider Services Address Phone Fax Patient Preferred    [facility] [address] [id number] [phone] --              Durable Medical Equipment    No services have been selected for the patient.                Dialysis/Infusion    No services have been selected for the patient.                Home Medical Care    No services have been selected for the patient.                Therapy    No services have been selected for the patient.                Community Resources    No services have been selected for the patient.                Community & DME    No services have been selected for the patient.                    Transportation Services  W/C Van: [facility]    Final Discharge Disposition Code: 03 - skilled nursing facility (SNF)